# Patient Record
Sex: MALE | Race: BLACK OR AFRICAN AMERICAN | Employment: OTHER | URBAN - METROPOLITAN AREA
[De-identification: names, ages, dates, MRNs, and addresses within clinical notes are randomized per-mention and may not be internally consistent; named-entity substitution may affect disease eponyms.]

---

## 2017-01-04 ENCOUNTER — HOSPITAL ENCOUNTER (OUTPATIENT)
Dept: NUTRITION | Age: 52
Discharge: HOME | End: 2017-01-04
Payer: COMMERCIAL

## 2017-01-04 PROCEDURE — 97802 MEDICAL NUTRITION INDIV IN: CPT

## 2017-01-04 NOTE — PROGRESS NOTES
César Gomez 5 [facility]  [address] [facility], [facility], [address] [place] [address]  Phone: [phone]  Fax: [phone]   Nutrition Assessment - Medical Nutrition Therapy   Outpatient Initial Evaluation         Patient Name: Carolina Galvez : 1965   Treatment Diagnosis: Prediabetes Onset Date:    Referral Source: Rakesh Cook MD Start of Care [facility]): 2017     Ht:  70 in  cm Wt: 268 lb  kg   BMI: 38.5  BMR   Male  BMR female      PMHx includes: 2 strokes, HTN, depression     Medications of Nutritional Significance:   Atorvastatin, carvedilol, lasix, hydralazine, lithium carbonate, nifedipine, KCl, ramipril, spironolactone     Subjective/Assessment:   47YO male referred to RD for elevated blood sugar. Per BMI, pt is considered obese class 2. Pt reports he has gained 20 lbs within the last year and is currently the heaviest weight he has ever been. He claims he did not struggle with his weight growing up, that is has been more recent and he would like to lose weight. Most recent A1c was 5.8% on 16 (pre-DM range) and pt has a family hx of diabetes. Pt is on disability due to having 2 strokes and suffering from residual effects (such as short-term memory loss). He lives with his wife; they both grocery shop, but she cooks. Pt had a membership to the [employer] [facility], but it has . He would like to sign up and start exercising again. He is mostly sedentary, but walks his dog for 15 minutes every morning. Pt expressed comprehension, motivation to change, and compliance is expected. Current Eating Patterns: Per diet recall, pt consumes an excessive amount of carbohydrates and simple sugar from sweetened beverages. B-fast yesterday was cocoa crispy cereal with NF milk and 24 oz of sweetened green tea. At [facility], pt will eat a spicy chicken sandwich with fries and large lemonade.  Another lunch meal example is a ham sandwich from [employer] with 24 oz of sweetened green tea and a lemon tart. On average, pt is drinking approximately 72 oz of added sugars from sweetened green tea per day and is not drinking enough water. Handouts/  Information Provided: [x]  Carbohydrates  [x]  Protein  []  Fiber  [x]  Serving Sizes  []  Meal and Snack Ideas  []  Food Journals [x]  Diabetes  []  Cholesterol  []  Sodium  []  Gen Nutr Guidelines  []  SBGM Guidelines  [x]  Others: List of non-starchy vegetables  Educated pt on the pathophysiology of Type II Diabetes and insulin resistance and the rationale for dietary modification and increased activity. Educated pt on carbohydrate food sources, counting carbohydrates, label reading, and meal timing. Discussed the Healthy Plate Method and appropriate portion sizes of different foods groups. Explained the importance of combining carbohydrates with protein at meals and reviewed foods that contain each nutrient. Explained calorie density and empty calories to assist pt with understanding portion sizes and limiting excess calories and sugar. Estimate Needs:   Calories: 2100  Protein: 131 Carbs: 236 Fat: 70   Kcal/day  g/day  g/day  g/day        percent: 25  45  30     Nutritional Goal - To promote lifestyle changes to result in:    [x]  Weight loss  [x]  Improved diabetic control  []  Decreased cholesterol levels  []  Decreased blood pressure  []  Weight maintenance []  Preventing any interactions associated with food allergies  []  Adequate weight gain toward goal weight  []  Other:        Recommendations: - Pt will combine carbohydrates with a protein source at every meal and snack.  - Pt will limit carbohydrate intake to 60-65 gm/meal and no more than 20 gm/snack.  - Pt will do aerobic exercise (walk, swim or bike) for at least 20 minutes, 3 days per week. Include strength training classes 2 days per week.      Information Reviewed with: pt   Potential Barriers to Learning: none     Dietitian Signature: Codie Jessica RD Date: 1/4/2017   Follow-up: Wed, 1/25/17 @10am Time: 10:41 AM

## 2017-01-06 DIAGNOSIS — I10 ESSENTIAL HYPERTENSION WITH GOAL BLOOD PRESSURE LESS THAN 140/90: ICD-10-CM

## 2017-01-06 RX ORDER — NIFEDIPINE 60 MG/1
TABLET, EXTENDED RELEASE ORAL
Qty: 30 TAB | Refills: 0 | Status: SHIPPED | OUTPATIENT
Start: 2017-01-06 | End: 2017-02-02 | Stop reason: SDUPTHER

## 2017-01-06 NOTE — TELEPHONE ENCOUNTER
Pt calling to request medication refill of:    Requested Prescriptions     Pending Prescriptions Disp Refills    NIFEdipine ER (PROCARDIA XL) 60 mg ER tablet 30 Tab 2     Sig: TAKE ONE TABLET BY MOUTH ONCE DAILY          be sent to [facility]. Pt has about 1 tab remaining. Pts last appt was 12/22/16, next appt sched for march 2017. Advised pt of 72 hour time frame for refill requests. Please advise.

## 2017-01-18 ENCOUNTER — OFFICE VISIT (OUTPATIENT)
Dept: FAMILY MEDICINE CLINIC | Age: 52
End: 2017-01-18

## 2017-01-18 VITALS
HEART RATE: 82 BPM | DIASTOLIC BLOOD PRESSURE: 80 MMHG | SYSTOLIC BLOOD PRESSURE: 130 MMHG | WEIGHT: 272 LBS | BODY MASS INDEX: 42.69 KG/M2 | TEMPERATURE: 97.6 F | OXYGEN SATURATION: 97 % | RESPIRATION RATE: 16 BRPM | HEIGHT: 67 IN

## 2017-01-18 DIAGNOSIS — I10 ESSENTIAL HYPERTENSION: Primary | ICD-10-CM

## 2017-01-18 DIAGNOSIS — R73.03 PRE-DIABETES: ICD-10-CM

## 2017-01-18 DIAGNOSIS — I50.32 CHRONIC DIASTOLIC CONGESTIVE HEART FAILURE (HCC): ICD-10-CM

## 2017-01-18 DIAGNOSIS — I10 ESSENTIAL HYPERTENSION: ICD-10-CM

## 2017-01-18 NOTE — PROGRESS NOTES
Les Betts is a 46 y.o. male presents to office for follow up on HTN. Patient would also like to discuss rash on his head. 1. Have you been to the ER, urgent care clinic or hospitalized since your last visit? no  2. Have you seen any other providers outside of [facility] since your last visit? no  3. Have you had a Flu shot this year?  No       Health Maintenance items with a due date reviewed with patient:  Health Maintenance Due   Topic Date Due    Hepatitis C Screening  1965    DTaP/Tdap/Td series (1 - Tdap) 05/01/1984    FOBT Q 1 YEAR AGE 50-75  05/01/2013    Pneumococcal 19-64 Highest Risk (2 of 3 - PCV13) 05/21/2013    INFLUENZA AGE 9 TO ADULT  08/01/2016

## 2017-01-25 ENCOUNTER — HOSPITAL ENCOUNTER (OUTPATIENT)
Dept: NUTRITION | Age: 52
Discharge: HOME | End: 2017-01-25
Payer: COMMERCIAL

## 2017-01-25 PROCEDURE — 97803 MED NUTRITION INDIV SUBSEQ: CPT

## 2017-01-25 NOTE — PROGRESS NOTES
NUTRITION - DAILY TREATMENT NOTE  Patient Name: Lupis Aguayo         Date: 2017  : 1965    YES Patient  Verified  Insurance: Payor: Brandy Love / Plan: [address] PT / Product Type: Commerical /    Diagnosis:    Prediabetes   In time:   10am             Out time:   10:30am   Total Treatment Time (min):   30     SUBJECTIVE    Medication Changes/New allergies or changes in medical history, any new surgeries or procedures? NO    If yes, update Summary List   Subjective Functional Status/Changes:  []  No changes reported   Pt reports there was a death in the family (father-in-law) so he did not accomplish all his goals since his first visit. He was unable to make it to the [place] [address] to follow-up on membership and he was only able to walk for 20 minutes 3 days since the last visit. Although his b-fast remains high in carbohydrates (cereal, milk, banana and cran-grape 50% juice), he started measuring out his portion of cereal and milk to reduce his carb intake. He also claims to have cut out many cookies and candy. He continues to drink beverages that are high in sugary, such as mingo and honey tea, juice and soda. Current Wt: 268# Previous Wt: 268# Wt Change: none     OBJECTIVE    Patient Education:  [x]  Review current plan with patient   []  Other: Discussed empty calories and sugary beverages and how they can affect BG and wt gain.    Handouts/  Information Provided: []  Carbohydrates  []  Protein  []  Fiber  []  Serving Sizes  []  Fluids  []  General guidelines []  Diabetes  []  Cholesterol  []  Sodium  []  SBGM  []  Food Journals  []  Others:    Estimated Needs: 2100 131 236 70    Calories Protein CHO Fat     ASSESSMENT    []  Pt Progressing Well [x]  Slow Progress []  No Progress    Other:    Understand Dietary       Changes/Recommendations []  No Change [x]  Improving []  N/A   Weight [x]  No Change []  Improving []  N/A   Glucose Levels []  No Change []  Improving []  N/A Cholesterol Levels []  No Change []  Improving []  N/A     RECOMMENDATIONS    - Pt will combine carbohydrates with a protein source at every meal and snack.  - Pt will limit carbohydrate intake to 60-65 gm/meal and no more than 20 gm/snack.  - Pt will do aerobic exercise (walk, swim or bike) for at least 20 minutes, 3 days per week. Include strength training classes 2 days per week. - Pt will replace juice and sweet tea with sugar-free drink mixes at meal times.      PLAN    [x]  Continue on current plan []  Follow-up PRN   []  Discharge due to :    [x]  Next Appt[de-identified] Wed, 2/22/17 @10am     Dietitian: Brown Torre RD    Date: 1/25/2017 Time: 11:43 AM

## 2017-02-02 DIAGNOSIS — I10 ESSENTIAL HYPERTENSION WITH GOAL BLOOD PRESSURE LESS THAN 140/90: ICD-10-CM

## 2017-02-02 NOTE — TELEPHONE ENCOUNTER
Pt calling to request medication refill of:  Requested Prescriptions     Pending Prescriptions Disp Refills    NIFEdipine ER (PROCARDIA XL) 60 mg ER tablet 30 Tab 0     Sig: TAKE ONE TABLET BY MOUTH ONCE DAILY       be sent to [facility] [address] [facility]  Pt has about 4 tabs remaining. Pts last appt was 1/18/17, next appt sched for 3/16/17. Advised pt of 72 hour time frame for refill requests. Please advise.

## 2017-02-03 RX ORDER — NIFEDIPINE 60 MG/1
TABLET, EXTENDED RELEASE ORAL
Qty: 30 TAB | Refills: 0 | Status: SHIPPED | OUTPATIENT
Start: 2017-02-03 | End: 2017-03-07 | Stop reason: SDUPTHER

## 2017-02-04 RX ORDER — NIFEDIPINE 60 MG/1
TABLET, EXTENDED RELEASE ORAL
Qty: 30 TAB | Refills: 0 | Status: SHIPPED | OUTPATIENT
Start: 2017-02-04 | End: 2017-02-27 | Stop reason: SDUPTHER

## 2017-02-15 DIAGNOSIS — I10 ESSENTIAL HYPERTENSION WITH GOAL BLOOD PRESSURE LESS THAN 140/90: ICD-10-CM

## 2017-02-15 RX ORDER — HYDRALAZINE HYDROCHLORIDE 25 MG/1
TABLET, FILM COATED ORAL
Qty: 90 TAB | Refills: 3 | Status: SHIPPED | OUTPATIENT
Start: 2017-02-15 | End: 2017-02-27 | Stop reason: SDUPTHER

## 2017-02-15 NOTE — TELEPHONE ENCOUNTER
Pt calling to request medication refill of:    Requested Prescriptions     Pending Prescriptions Disp Refills    hydrALAZINE (APRESOLINE) 25 mg tablet 90 Tab 3     Sig: TAKE ONE TABLET BY MOUTH THREE TIMES DAILY          be sent to [address] [place] [address]. Pt has about 3 tabs remaining. Pts last appt was 01/18, next appt sched for 03/16. Advised pt of 72 hour time frame for refill requests. Please advise.

## 2017-02-27 ENCOUNTER — OFFICE VISIT (OUTPATIENT)
Dept: FAMILY MEDICINE CLINIC | Age: 52
End: 2017-02-27

## 2017-02-27 VITALS
RESPIRATION RATE: 16 BRPM | HEART RATE: 59 BPM | WEIGHT: 273 LBS | BODY MASS INDEX: 42.85 KG/M2 | SYSTOLIC BLOOD PRESSURE: 120 MMHG | DIASTOLIC BLOOD PRESSURE: 70 MMHG | HEIGHT: 67 IN | TEMPERATURE: 97.4 F | OXYGEN SATURATION: 97 %

## 2017-02-27 DIAGNOSIS — R19.7 DIARRHEA, UNSPECIFIED TYPE: Primary | ICD-10-CM

## 2017-02-27 DIAGNOSIS — R68.89 SENSATION OF FEELING COLD: ICD-10-CM

## 2017-02-27 DIAGNOSIS — N18.30 CKD (CHRONIC KIDNEY DISEASE) STAGE 3, GFR 30-59 ML/MIN (HCC): ICD-10-CM

## 2017-02-27 RX ORDER — FUROSEMIDE 20 MG/1
20 TABLET ORAL 2 TIMES DAILY
Qty: 90 TAB | Refills: 1 | Status: SHIPPED | OUTPATIENT
Start: 2017-02-27 | End: 2017-05-22 | Stop reason: SDUPTHER

## 2017-02-27 RX ORDER — FUROSEMIDE 20 MG/1
20 TABLET ORAL 2 TIMES DAILY
COMMUNITY
End: 2017-02-27 | Stop reason: SDUPTHER

## 2017-02-27 RX ORDER — FUROSEMIDE 40 MG/1
TABLET ORAL
Qty: 120 TAB | Refills: 6 | Status: CANCELLED | OUTPATIENT
Start: 2017-02-27

## 2017-02-27 RX ORDER — HYDRALAZINE HYDROCHLORIDE 25 MG/1
TABLET, FILM COATED ORAL
Qty: 90 TAB | Refills: 3 | Status: SHIPPED | OUTPATIENT
Start: 2017-02-27 | End: 2017-10-30 | Stop reason: SDUPTHER

## 2017-02-27 NOTE — PROGRESS NOTES
HISTORY OF PRESENT ILLNESS  Sagar Chandler is a 46 y.o. male here for evaluation of diarrhea and feeling cold. Patient states that 10 days ago he noticed loose stools without abdominal pain or nausea or vomiting. There was no stool color change. Since then the symptoms have resolved. He still has a sensation of feeling cold. .. Chills    The history is provided by the patient. This is a new problem. The problem has been resolved. His temperature was unmeasured prior to arrival. Associated symptoms include diarrhea. Pertinent negatives include no vomiting, no congestion, no headaches, no sore throat, no muscle aches, no shortness of breath and no urinary symptoms. He has tried nothing for the symptoms. Diarrhea    This is a new problem. The problem has been resolved. There has been no fever. The stool consistency is described as watery. Associated symptoms include chills. Pertinent negatives include no vomiting and no headaches. Risk factors include recent illness. He has tried nothing for the symptoms. His past medical history does not include irritable bowel syndrome, inflammatory bowel disease, bowel resection or malabsorption. Chronic Kidney Disease   The history is provided by the medical records. This is a chronic problem. The problem has been gradually improving. Pertinent negatives include no headaches and no shortness of breath. Nothing aggravates the symptoms. Relieved by: Decreasing Lasix.      Allergies   Allergen Reactions    Pcn [Penicillins] Anaphylaxis and Angioedema    Coconut Swelling     Current Outpatient Prescriptions on File Prior to Visit   Medication Sig Dispense Refill    hydrALAZINE (APRESOLINE) 25 mg tablet TAKE ONE TABLET BY MOUTH THREE TIMES DAILY 90 Tab 3    NIFEdipine ER (PROCARDIA XL) 60 mg ER tablet TAKE ONE TABLET BY MOUTH ONCE DAILY 30 Tab 0    atorvastatin (LIPITOR) 20 mg tablet TAKE ONE TABLET BY MOUTH ONCE DAILY 30 Tab 6    spironolactone (ALDACTONE) 25 mg tablet Take one half tab daily 30 Tab 6    ramipril (ALTACE) 10 mg capsule Take 1 Cap by mouth daily. 30 Cap 1    potassium chloride (KLOR-CON M10) 10 mEq tablet TAKE TWO TABLETS BY MOUTH ONCE DAILY 60 Tab 3    carvedilol (COREG) 25 mg tablet TAKE ONE TABLET BY MOUTH TWICE DAILY WITH  MEALS 180 Tab 1    febuxostat (ULORIC) 40 mg tab tablet Take 2 Tabs by mouth daily. 90 Tab 3    diclofenac (VOLTAREN) 1 % gel Apply 4 g to affected area four (4) times daily. 100 g 0    MISCELLANEOUS MEDICAL SUPPLY (COMPRESSION STOCKINGS) by Does Not Apply route. Wear while awake. Remove at HS.  LORazepam (ATIVAN) 1 mg tablet Take 1 Tab by mouth every eight (8) hours as needed. Max Daily Amount: 3 mg. Indications: ANXIETY, INSOMNIA 90 Tab 0    ARIPiprazole (ABILIFY) 5 mg tablet Take  by mouth daily.  aspirin delayed-release 81 mg tablet Take 81 mg by mouth daily.  lithium carbonate 300 mg tablet Take 300 mg by mouth daily.  furosemide (LASIX) 40 mg tablet Take two tabs twice daily  Indications: EDEMA (Patient taking differently: 20 mg. Take two tabs twice daily  Indications: Edema) 120 Tab 6     No current facility-administered medications on file prior to visit.       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     Cardiomyopathy ([facility]     CHF (congestive heart failure) (HCC)     CVA (cerebral infarction)     Depression     ED (erectile dysfunction)     Gastritis     Gout     HTN (hypertension)     Hyperlipemia, mixed     Hypoxia     Kidney disease, chronic, stage III (moderate, EGFR 30-59 ml/min)     Pre-diabetes     Sleep apnea     Vitamin D deficiency      Past Surgical History:   Procedure Laterality Date    HX ACL RECONSTRUCTION      HX APPENDECTOMY  2004    became septic from a rupture     Family History   Problem Relation Age of Onset    No Known Problems Mother     Hypertension Father     Diabetes Father     Cancer Father      prostate    No Known Problems Sister     Hypertension Maternal Grandmother     No Known Problems Maternal Grandfather     Alcohol abuse Paternal Grandmother     Bipolar Disorder Paternal Grandmother     No Known Problems Paternal Grandfather      Social History     Social History    Marital status:      Spouse name: N/A    Number of children: N/A    Years of education: N/A     Occupational History    Not on file. Social History Main Topics    Smoking status: Never Smoker    Smokeless tobacco: Never Used    Alcohol use No      Comment: holidays/ occasions    Drug use: No    Sexual activity: Yes     Partners: Female     Other Topics Concern     Service Yes     [employer]    Blood Transfusions No    Caffeine Concern No    Occupational Exposure No    Hobby Hazards No    Sleep Concern No     sleep apnea, has cpap    Stress Concern Yes     family related    Weight Concern No     Pt has lost 20lbs but is wanting to lose more    Special Diet Yes     no salt    Back Care No    Exercise No    Bike Helmet No    Seat Belt Yes    Self-Exams Yes     Social History Narrative         Review of Systems   Constitutional: Positive for chills. HENT: Negative for congestion and sore throat. Respiratory: Negative. Negative for shortness of breath. Cardiovascular: Negative. Gastrointestinal: Positive for diarrhea. Negative for vomiting. Musculoskeletal: Negative. Neurological: Negative. Negative for headaches. Endo/Heme/Allergies: Negative. Psychiatric/Behavioral: Negative. Visit Vitals    /70 (BP 1 Location: Right arm, BP Patient Position: Sitting)    Pulse (!) 59    Temp 97.4 °F (36.3 °C) (Oral)    Resp 16    Ht 5' 7\" (1.702 m)    Wt 273 lb (123.8 kg)    SpO2 97%    BMI 42.76 kg/m2       Physical Exam   Constitutional: He is oriented to person, place, and time. He appears well-developed and well-nourished. HENT:   Head: Normocephalic and atraumatic.    Cardiovascular: Normal rate, regular rhythm, normal heart sounds and intact distal pulses. Exam reveals no gallop and no friction rub. No murmur heard. Pulmonary/Chest: Effort normal and breath sounds normal. No respiratory distress. He has no wheezes. He has no rales. Musculoskeletal: He exhibits no edema. Neurological: He is alert and oriented to person, place, and time. No cranial nerve deficit. Psychiatric: He has a normal mood and affect. His behavior is normal. Judgment and thought content normal.   Nursing note and vitals reviewed. ASSESSMENT and PLAN    ICD-10-CM ICD-9-CM    1. Diarrhea, unspecified type R19.7 787.91    2. Sensation of feeling cold R68.89 780.99 TSH 3RD GENERATION   3. CKD (chronic kidney disease) stage 3, GFR 30-59 ml/min Y98.3 609.9 METABOLIC PANEL, BASIC     Follow-up Disposition:  Return for keep regular scheduled appointment.

## 2017-02-28 LAB
ANION GAP SERPL CALC-SCNC: 21 MMOL/L
BUN SERPL-MCNC: 16 MG/DL (ref 6–22)
CALCIUM SERPL-MCNC: 8.8 MG/DL (ref 8.4–10.4)
CHLORIDE SERPL-SCNC: 101 MMOL/L (ref 98–110)
CO2 SERPL-SCNC: 23 MMOL/L (ref 20–32)
CREAT SERPL-MCNC: 1.4 MG/DL (ref 0.5–1.2)
GFRAA, 66117: >60
GFRNA, 66118: 51.7
GLUCOSE SERPL-MCNC: 98 MG/DL (ref 65–99)
POTASSIUM SERPL-SCNC: 4.1 MMOL/L (ref 3.5–5.5)
SODIUM SERPL-SCNC: 145 MMOL/L (ref 133–145)
TSH SERPL DL<=0.005 MIU/L-ACNC: 1.89 MCU/ML (ref 0.27–4.2)

## 2017-03-07 DIAGNOSIS — I10 ESSENTIAL HYPERTENSION WITH GOAL BLOOD PRESSURE LESS THAN 140/90: ICD-10-CM

## 2017-03-07 NOTE — TELEPHONE ENCOUNTER
Pt calling to request a refill on the pended medication. Pt has enough to last the rest of this week and is going out of town on Saturday. Pt would like to be able to refill this before then. Please advise.     Requested Prescriptions     Pending Prescriptions Disp Refills    NIFEdipine ER (PROCARDIA XL) 60 mg ER tablet 30 Tab 0

## 2017-03-08 RX ORDER — NIFEDIPINE 60 MG/1
TABLET, EXTENDED RELEASE ORAL
Qty: 30 TAB | Refills: 6 | Status: SHIPPED | OUTPATIENT
Start: 2017-03-08 | End: 2017-10-30 | Stop reason: SDUPTHER

## 2017-03-08 NOTE — TELEPHONE ENCOUNTER
Dr. Beba Galvez, please see refill request for patient, thank you!     Requested Prescriptions     Pending Prescriptions Disp Refills    NIFEdipine ER (PROCARDIA XL) 60 mg ER tablet 30 Tab 0

## 2017-03-15 ENCOUNTER — OFFICE VISIT (OUTPATIENT)
Dept: FAMILY MEDICINE CLINIC | Age: 52
End: 2017-03-15

## 2017-03-15 VITALS
WEIGHT: 276 LBS | BODY MASS INDEX: 43.32 KG/M2 | TEMPERATURE: 97.8 F | OXYGEN SATURATION: 98 % | HEIGHT: 67 IN | HEART RATE: 68 BPM | SYSTOLIC BLOOD PRESSURE: 140 MMHG | DIASTOLIC BLOOD PRESSURE: 74 MMHG | RESPIRATION RATE: 16 BRPM

## 2017-03-15 DIAGNOSIS — E78.2 HYPERLIPEMIA, MIXED: ICD-10-CM

## 2017-03-15 DIAGNOSIS — I10 ESSENTIAL HYPERTENSION: Primary | ICD-10-CM

## 2017-03-15 DIAGNOSIS — I50.32 CHRONIC DIASTOLIC CONGESTIVE HEART FAILURE (HCC): ICD-10-CM

## 2017-03-15 NOTE — PROGRESS NOTES
HISTORY OF PRESENT ILLNESS  Michael Acuna is a 46 y.o. male here for follow-up of hypertension CHF and hyperlipidemia. Patient is still taking lithium. Raysa Riser Hypertension    The history is provided by the patient and medical records. This is a chronic problem. The problem has been gradually improving. Pertinent negatives include no chest pain, no orthopnea, no headaches, no peripheral edema and no shortness of breath. There are no associated agents to hypertension. Risk factors include dyslipidemia, obesity and male gender. CHF   The history is provided by the patient and medical records. This is a chronic problem. The problem has been gradually improving. Pertinent negatives include no chest pain, no headaches and no shortness of breath. Nothing aggravates the symptoms. The symptoms are relieved by medications. Treatments tried: Carvedilol Lasix and hydralazine and Spironolactone. The treatment provided significant relief. Cholesterol Problem   The history is provided by the patient and medical records. This is a chronic problem. The problem has been gradually improving. Pertinent negatives include no chest pain, no headaches and no shortness of breath. The symptoms are aggravated by eating. The symptoms are relieved by medications. Treatments tried: Lipitor. The treatment provided significant relief. Allergies   Allergen Reactions    Pcn [Penicillins] Anaphylaxis and Angioedema    Coconut Swelling     Current Outpatient Prescriptions on File Prior to Visit   Medication Sig Dispense Refill    NIFEdipine ER (PROCARDIA XL) 60 mg ER tablet TAKE ONE TABLET BY MOUTH ONCE DAILY 30 Tab 6    hydrALAZINE (APRESOLINE) 25 mg tablet TAKE ONE TABLET BY MOUTH THREE TIMES DAILY 90 Tab 3    furosemide (LASIX) 20 mg tablet Take 1 Tab by mouth two (2) times a day.  90 Tab 1    atorvastatin (LIPITOR) 20 mg tablet TAKE ONE TABLET BY MOUTH ONCE DAILY 30 Tab 6    spironolactone (ALDACTONE) 25 mg tablet Take one half tab daily 30 Tab 6    ramipril (ALTACE) 10 mg capsule Take 1 Cap by mouth daily. 30 Cap 1    potassium chloride (KLOR-CON M10) 10 mEq tablet TAKE TWO TABLETS BY MOUTH ONCE DAILY 60 Tab 3    carvedilol (COREG) 25 mg tablet TAKE ONE TABLET BY MOUTH TWICE DAILY WITH  MEALS 180 Tab 1    febuxostat (ULORIC) 40 mg tab tablet Take 2 Tabs by mouth daily. 90 Tab 3    furosemide (LASIX) 40 mg tablet Take two tabs twice daily  Indications: EDEMA (Patient taking differently: 20 mg. Take two tabs twice daily  Indications: Edema) 120 Tab 6    diclofenac (VOLTAREN) 1 % gel Apply 4 g to affected area four (4) times daily. 100 g 0    MISCELLANEOUS MEDICAL SUPPLY (COMPRESSION STOCKINGS) by Does Not Apply route. Wear while awake. Remove at HS.  LORazepam (ATIVAN) 1 mg tablet Take 1 Tab by mouth every eight (8) hours as needed. Max Daily Amount: 3 mg. Indications: ANXIETY, INSOMNIA 90 Tab 0    ARIPiprazole (ABILIFY) 5 mg tablet Take  by mouth daily.  aspirin delayed-release 81 mg tablet Take 81 mg by mouth daily.  lithium carbonate 300 mg tablet Take 300 mg by mouth daily. No current facility-administered medications on file prior to visit.       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 ([facility]     Cardiomyopathy ([facility]     CHF (congestive heart failure) (HCC)     CVA (cerebral infarction)     Depression     ED (erectile dysfunction)     Gastritis     Gout     HTN (hypertension)     Hyperlipemia, mixed     Hypoxia     Kidney disease, chronic, stage III (moderate, EGFR 30-59 ml/min)     Pre-diabetes     Sleep apnea     Vitamin D deficiency      Past Surgical History:   Procedure Laterality Date    HX ACL RECONSTRUCTION      HX APPENDECTOMY  2004    became septic from a rupture     Family History   Problem Relation Age of Onset    No Known Problems Mother     Hypertension Father     Diabetes Father     Cancer Father      prostate    No Known Problems Sister    [place] Hypertension Maternal Grandmother     No Known Problems Maternal Grandfather     Alcohol abuse Paternal Grandmother     Bipolar Disorder Paternal Grandmother     No Known Problems Paternal Grandfather      Social History     Social History    Marital status:      Spouse name: N/A    Number of children: N/A    Years of education: N/A     Occupational History    Not on file. Social History Main Topics    Smoking status: Never Smoker    Smokeless tobacco: Never Used    Alcohol use No      Comment: holidays/ occasions    Drug use: No    Sexual activity: Yes     Partners: Female     Other Topics Concern     Service Yes     [employer]    Blood Transfusions No    Caffeine Concern No    Occupational Exposure No    Hobby Hazards No    Sleep Concern No     sleep apnea, has cpap    Stress Concern Yes     family related    Weight Concern No     Pt has lost 20lbs but is wanting to lose more    Special Diet Yes     no salt    Back Care No    Exercise No    Bike Helmet No    Seat Belt Yes    Self-Exams Yes     Social History Narrative       Review of Systems   Constitutional: Negative. Respiratory: Negative. Negative for shortness of breath. Cardiovascular: Negative. Negative for chest pain and orthopnea. Musculoskeletal: Negative. Neurological: Negative for headaches. Endo/Heme/Allergies: Negative. Psychiatric/Behavioral: Negative. Visit Vitals    /74 (BP 1 Location: Right arm, BP Patient Position: Sitting)    Pulse 68    Temp 97.8 °F (36.6 °C) (Oral)    Resp 16    Ht 5' 7\" (1.702 m)    Wt 276 lb (125.2 kg)    SpO2 98%    BMI 43.23 kg/m2         Physical Exam   Constitutional: He is oriented to person, place, and time. He appears well-developed and well-nourished. HENT:   Head: Normocephalic and atraumatic. Cardiovascular: Normal rate, regular rhythm, normal heart sounds and intact distal pulses. Exam reveals no gallop and no friction rub.     No murmur heard. Pulmonary/Chest: Effort normal and breath sounds normal. No respiratory distress. He has no wheezes. He has no rales. Musculoskeletal: Normal range of motion. He exhibits no edema or deformity. Neurological: He is alert and oriented to person, place, and time. No cranial nerve deficit. Skin: Skin is warm and dry. Psychiatric: He has a normal mood and affect. His behavior is normal. Judgment and thought content normal.   Nursing note and vitals reviewed. ASSESSMENT and PLAN    ICD-10-CM ICD-9-CM    1. Essential hypertension I10 401.9    2. Hyperlipemia, mixed E78.2 272.2    3. Chronic diastolic congestive heart failure ([facility] I50.32 428.32      428.0      Follow-up Disposition:  Return in about 4 months (around 7/15/2017).   current treatment plan is effective, no change in therapy

## 2017-03-22 ENCOUNTER — APPOINTMENT (OUTPATIENT)
Dept: NUTRITION | Age: 52
End: 2017-03-22

## 2017-04-14 DIAGNOSIS — I50.33 ACUTE ON CHRONIC DIASTOLIC CONGESTIVE HEART FAILURE (HCC): ICD-10-CM

## 2017-04-14 DIAGNOSIS — R06.00 DYSPNEA, UNSPECIFIED TYPE: ICD-10-CM

## 2017-04-14 NOTE — TELEPHONE ENCOUNTER
Please see refill request.    Requested Prescriptions     Pending Prescriptions Disp Refills    furosemide (LASIX) 40 mg tablet 120 Tab 6     Sig: Take two tabs twice daily  Indications: Edema

## 2017-04-19 RX ORDER — FUROSEMIDE 40 MG/1
20 TABLET ORAL 2 TIMES DAILY
Qty: 60 TAB | Refills: 6 | Status: SHIPPED | OUTPATIENT
Start: 2017-04-19 | End: 2017-05-15 | Stop reason: DRUGHIGH

## 2017-05-09 ENCOUNTER — TELEPHONE (OUTPATIENT)
Dept: FAMILY MEDICINE CLINIC | Age: 52
End: 2017-05-09

## 2017-05-09 NOTE — TELEPHONE ENCOUNTER
Pt calling to inform Dr. Keven Renteria that his Psych provider Dr. Russ Navas took him off of Lithium. He has been off of it since May 4th. He just wanted to let Dr. Keven Renteria know, and didn't know if she wanted him to start a different medication. Please advise.

## 2017-05-15 ENCOUNTER — OFFICE VISIT (OUTPATIENT)
Dept: FAMILY MEDICINE CLINIC | Age: 52
End: 2017-05-15

## 2017-05-15 VITALS
SYSTOLIC BLOOD PRESSURE: 140 MMHG | TEMPERATURE: 98.2 F | WEIGHT: 278.2 LBS | OXYGEN SATURATION: 97 % | RESPIRATION RATE: 18 BRPM | HEIGHT: 67 IN | HEART RATE: 73 BPM | BODY MASS INDEX: 43.66 KG/M2 | DIASTOLIC BLOOD PRESSURE: 68 MMHG

## 2017-05-15 DIAGNOSIS — E78.2 HYPERLIPEMIA, MIXED: ICD-10-CM

## 2017-05-15 DIAGNOSIS — I50.32 CHRONIC DIASTOLIC CONGESTIVE HEART FAILURE (HCC): Primary | ICD-10-CM

## 2017-05-15 DIAGNOSIS — I10 ESSENTIAL HYPERTENSION: ICD-10-CM

## 2017-05-15 DIAGNOSIS — J06.9 VIRAL UPPER RESPIRATORY TRACT INFECTION: ICD-10-CM

## 2017-05-15 RX ORDER — LOSARTAN POTASSIUM 100 MG/1
100 TABLET ORAL DAILY
Qty: 30 TAB | Refills: 6 | Status: SHIPPED | OUTPATIENT
Start: 2017-05-15 | End: 2017-12-04 | Stop reason: SDUPTHER

## 2017-05-15 RX ORDER — AZITHROMYCIN 250 MG/1
250 TABLET, FILM COATED ORAL SEE ADMIN INSTRUCTIONS
Qty: 6 TAB | Refills: 0 | Status: SHIPPED | OUTPATIENT
Start: 2017-05-15 | End: 2017-05-23

## 2017-05-15 NOTE — PROGRESS NOTES
HISTORY OF PRESENT ILLNESS  Karen Munoz is a 45 y.o. male here for evaluation of upper respiratory tract symptoms. Of note patient is no longer taking lithium. Patient has mild cough productive of yellow sputum. He is also complaining of congestion of his right nostril. CHF   The history is provided by the patient and medical records. This is a chronic problem. The problem has been gradually improving. Pertinent negatives include no chest pain, no abdominal pain, no headaches and no shortness of breath. The symptoms are aggravated by drinking. The symptoms are relieved by medications. Treatments tried: Lasix hydralazine Spironolactone and ramipril. The treatment provided significant relief. Cough   The history is provided by the patient and medical records. This is a new problem. The problem has been gradually worsening. Pertinent negatives include no chest pain, no abdominal pain, no headaches and no shortness of breath. Nothing aggravates the symptoms. Nothing relieves the symptoms. He has tried nothing for the symptoms. Hypertension    The history is provided by the patient and medical records. This is a chronic problem. The problem has been gradually improving. Pertinent negatives include no chest pain, no orthopnea, no headaches, no peripheral edema, no dizziness, no shortness of breath and no nausea. Risk factors include obesity and dyslipidemia. Cholesterol Problem   The history is provided by the medical records. This is a chronic problem. The problem has been gradually improving. Pertinent negatives include no chest pain, no abdominal pain, no headaches and no shortness of breath. The symptoms are aggravated by eating. The symptoms are relieved by medications. Treatments tried: Lipitor. The treatment provided significant relief. URI    The history is provided by the patient. This is a new problem. The problem has not changed since onset. There has been no fever.  Associated symptoms include congestion and cough. Pertinent negatives include no chest pain, no abdominal pain, no diarrhea, no nausea and no headaches. Allergies   Allergen Reactions    Pcn [Penicillins] Anaphylaxis and Angioedema    Coconut Swelling     Current Outpatient Prescriptions on File Prior to Visit   Medication Sig Dispense Refill    NIFEdipine ER (PROCARDIA XL) 60 mg ER tablet TAKE ONE TABLET BY MOUTH ONCE DAILY 30 Tab 6    hydrALAZINE (APRESOLINE) 25 mg tablet TAKE ONE TABLET BY MOUTH THREE TIMES DAILY 90 Tab 3    furosemide (LASIX) 20 mg tablet Take 1 Tab by mouth two (2) times a day. 90 Tab 1    atorvastatin (LIPITOR) 20 mg tablet TAKE ONE TABLET BY MOUTH ONCE DAILY 30 Tab 6    spironolactone (ALDACTONE) 25 mg tablet Take one half tab daily 30 Tab 6    ramipril (ALTACE) 10 mg capsule Take 1 Cap by mouth daily. 30 Cap 1    potassium chloride (KLOR-CON M10) 10 mEq tablet TAKE TWO TABLETS BY MOUTH ONCE DAILY 60 Tab 3    carvedilol (COREG) 25 mg tablet TAKE ONE TABLET BY MOUTH TWICE DAILY WITH  MEALS 180 Tab 1    febuxostat (ULORIC) 40 mg tab tablet Take 2 Tabs by mouth daily. 90 Tab 3    diclofenac (VOLTAREN) 1 % gel Apply 4 g to affected area four (4) times daily. 100 g 0    MISCELLANEOUS MEDICAL SUPPLY (COMPRESSION STOCKINGS) by Does Not Apply route. Wear while awake. Remove at HS.  LORazepam (ATIVAN) 1 mg tablet Take 1 Tab by mouth every eight (8) hours as needed. Max Daily Amount: 3 mg. Indications: ANXIETY, INSOMNIA 90 Tab 0    ARIPiprazole (ABILIFY) 5 mg tablet Take 5 mg by mouth daily.  aspirin delayed-release 81 mg tablet Take 81 mg by mouth daily. No current facility-administered medications on file prior to visit.       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 ([facility]     Cardiomyopathy ([facility]     CHF (congestive heart failure) (HCC)     CVA (cerebral infarction)     Depression     ED (erectile dysfunction)     Gastritis     Gout     HTN (hypertension)     Hyperlipemia, mixed     Hypoxia     Kidney disease, chronic, stage III (moderate, EGFR 30-59 ml/min)     Pre-diabetes     Sleep apnea     Vitamin D deficiency      Social History     Social History    Marital status:      Spouse name: N/A    Number of children: N/A    Years of education: N/A     Occupational History    Not on file. Social History Main Topics    Smoking status: Never Smoker    Smokeless tobacco: Never Used    Alcohol use No      Comment: holidays/ occasions    Drug use: No    Sexual activity: Yes     Partners: Female     Other Topics Concern     Service Yes     [employer]    Blood Transfusions No    Caffeine Concern No    Occupational Exposure No    Hobby Hazards No    Sleep Concern No     sleep apnea, has cpap    Stress Concern Yes     family related    Weight Concern No     Pt has lost 20lbs but is wanting to lose more    Special Diet Yes     no salt    Back Care No    Exercise No    Bike Helmet No    Seat Belt Yes    Self-Exams Yes     Social History Narrative     Family History   Problem Relation Age of Onset    No Known Problems Mother     Hypertension Father     Diabetes Father     Cancer Father      prostate    No Known Problems Sister     Hypertension Maternal Grandmother     No Known Problems Maternal Grandfather     Alcohol abuse Paternal Grandmother     Bipolar Disorder Paternal Grandmother     No Known Problems Paternal Grandfather      Social History     Social History    Marital status:      Spouse name: N/A    Number of children: N/A    Years of education: N/A     Occupational History    Not on file.      Social History Main Topics    Smoking status: Never Smoker    Smokeless tobacco: Never Used    Alcohol use No      Comment: holidays/ occasions    Drug use: No    Sexual activity: Yes     Partners: Female     Other Topics Concern     Service Yes     [employer]    Blood Transfusions No    Caffeine Concern No    Occupational Exposure No    Hobby Hazards No    Sleep Concern No     sleep apnea, has cpap    Stress Concern Yes     family related    Weight Concern No     Pt has lost 20lbs but is wanting to lose more    Special Diet Yes     no salt    Back Care No    Exercise No    Bike Helmet No    Seat Belt Yes    Self-Exams Yes     Social History Narrative         Review of Systems   HENT: Positive for congestion. Respiratory: Positive for cough. Negative for shortness of breath. Cardiovascular: Negative for chest pain and orthopnea. Gastrointestinal: Negative for abdominal pain, diarrhea and nausea. Neurological: Negative for dizziness and headaches. Visit Vitals    /68 (BP 1 Location: Right arm, BP Patient Position: Sitting)  Comment: manual    Pulse 73    Temp 98.2 °F (36.8 °C) (Oral)    Resp 18    Ht 5' 7\" (1.702 m)    Wt 278 lb 3.2 oz (126.2 kg)    SpO2 97%    BMI 43.57 kg/m2         Physical Exam   Constitutional: He is oriented to person, place, and time. He appears well-developed and well-nourished. HENT:   Head: Normocephalic and atraumatic. Cardiovascular: Normal rate, regular rhythm, normal heart sounds and intact distal pulses. Exam reveals no gallop and no friction rub. No murmur heard. Pulmonary/Chest: Effort normal and breath sounds normal. No respiratory distress. He has no wheezes. He has no rales. Musculoskeletal: Normal range of motion. He exhibits no edema, tenderness or deformity. Neurological: He is alert and oriented to person, place, and time. No cranial nerve deficit. Coordination normal.   Skin: Skin is warm and dry. No rash noted. No erythema. No pallor. Psychiatric: He has a normal mood and affect. His behavior is normal. Judgment and thought content normal.   Nursing note and vitals reviewed. ASSESSMENT and PLAN    ICD-10-CM ICD-9-CM    1.  Chronic diastolic congestive heart failure (HCC) I50.32 428.32 losartan (COZAAR) 100 mg tablet     428.0    2. Hyperlipemia, mixed E78.2 272.2 losartan (COZAAR) 100 mg tablet   3. Essential hypertension I10 401.9 losartan (COZAAR) 100 mg tablet   4. Viral upper respiratory tract infection J06.9 465.9     B97.89       Follow-up Disposition:  Return in about 4 weeks (around 6/12/2017).

## 2017-05-15 NOTE — PROGRESS NOTES
Prince Olivares is a 45 y.o. male presents in office with c/o cough with yellow sputum and nasal congestion. Patient also wanted to make you aware that he was taken off lithium. 1. Have you been to the ER, urgent care clinic since your last visit? Hospitalized since your last visit? No    2. Have you seen or consulted any other health care providers outside of the [address] [place] [address] since your last visit? Include any pap smears or colon screening.  No

## 2017-05-22 NOTE — TELEPHONE ENCOUNTER
Requested Prescriptions     Pending Prescriptions Disp Refills    furosemide (LASIX) 20 mg tablet 180 Tab 1     Sig: Take 1 Tab by mouth two (2) times a day.

## 2017-05-23 ENCOUNTER — OFFICE VISIT (OUTPATIENT)
Dept: FAMILY MEDICINE CLINIC | Age: 52
End: 2017-05-23

## 2017-05-23 VITALS
WEIGHT: 280 LBS | HEART RATE: 56 BPM | DIASTOLIC BLOOD PRESSURE: 90 MMHG | BODY MASS INDEX: 43.95 KG/M2 | HEIGHT: 67 IN | RESPIRATION RATE: 16 BRPM | TEMPERATURE: 98.9 F | SYSTOLIC BLOOD PRESSURE: 150 MMHG | OXYGEN SATURATION: 97 %

## 2017-05-23 DIAGNOSIS — R19.7 DIARRHEA, UNSPECIFIED TYPE: Primary | ICD-10-CM

## 2017-05-23 DIAGNOSIS — K92.1 MELENA: ICD-10-CM

## 2017-05-23 NOTE — PROGRESS NOTES
Mamta Solitario is a 45 y.o. male presents to office for stomach flu x 4 days       1. Have you been to the ER, urgent care clinic or hospitalized since your last visit?  No          Health Maintenance items with a due date reviewed with patient:  Health Maintenance Due   Topic Date Due    Hepatitis C Screening  1965    DTaP/Tdap/Td series (1 - Tdap) 05/01/1984    FOBT Q 1 YEAR AGE 50-75  05/01/2013    Pneumococcal 19-64 Highest Risk (2 of 3 - PCV13) 05/21/2013

## 2017-05-23 NOTE — PROGRESS NOTES
HISTORY OF PRESENT ILLNESS  Latrice Frey is a 45 y.o. male here for evaluation of abdominal pain with loose stools and diarrhea. Patient states that 3 days ago he developed loose stools up to 4-5 a day for 2 days. He denies any abdominal pain. Patient states that his stools were darker than usual.  Symptoms have now resolved. .  Diarrhea    The history is provided by the patient. This is a new problem. The problem has been gradually improving. There has been no fever. The stool consistency is described as watery. Associated symptoms include abdominal pain. Pertinent negatives include no chills, no sweats, no headaches, no arthralgias, no myalgias, no cough, no anal bleeding and no back pain. He has tried nothing for the symptoms. His past medical history does not include irritable bowel syndrome, inflammatory bowel disease, short gut syndrome, bowel resection, recent abdominal surgery, malabsorption, gastric bypass, nursing home resident or small bowel obstruction. Anal Bleeding   The history is provided by the patient and medical records. This is a chronic problem. The problem has been gradually improving. Associated symptoms include abdominal pain. Pertinent negatives include no chest pain, no headaches and no shortness of breath. Nothing aggravates the symptoms. Nothing relieves the symptoms. He has tried nothing for the symptoms. Allergies   Allergen Reactions    Pcn [Penicillins] Anaphylaxis and Angioedema    Coconut Swelling     Current Outpatient Prescriptions on File Prior to Visit   Medication Sig Dispense Refill    losartan (COZAAR) 100 mg tablet Take 1 Tab by mouth daily. 30 Tab 6    NIFEdipine ER (PROCARDIA XL) 60 mg ER tablet TAKE ONE TABLET BY MOUTH ONCE DAILY 30 Tab 6    hydrALAZINE (APRESOLINE) 25 mg tablet TAKE ONE TABLET BY MOUTH THREE TIMES DAILY 90 Tab 3    furosemide (LASIX) 20 mg tablet Take 1 Tab by mouth two (2) times a day.  90 Tab 1    atorvastatin (LIPITOR) 20 mg tablet TAKE ONE TABLET BY MOUTH ONCE DAILY 30 Tab 6    spironolactone (ALDACTONE) 25 mg tablet Take one half tab daily 30 Tab 6    potassium chloride (KLOR-CON M10) 10 mEq tablet TAKE TWO TABLETS BY MOUTH ONCE DAILY 60 Tab 3    carvedilol (COREG) 25 mg tablet TAKE ONE TABLET BY MOUTH TWICE DAILY WITH  MEALS 180 Tab 1    febuxostat (ULORIC) 40 mg tab tablet Take 2 Tabs by mouth daily. 90 Tab 3    diclofenac (VOLTAREN) 1 % gel Apply 4 g to affected area four (4) times daily. 100 g 0    MISCELLANEOUS MEDICAL SUPPLY (COMPRESSION STOCKINGS) by Does Not Apply route. Wear while awake. Remove at HS.  LORazepam (ATIVAN) 1 mg tablet Take 1 Tab by mouth every eight (8) hours as needed. Max Daily Amount: 3 mg. Indications: ANXIETY, INSOMNIA 90 Tab 0    ARIPiprazole (ABILIFY) 5 mg tablet Take 5 mg by mouth daily.  aspirin delayed-release 81 mg tablet Take 81 mg by mouth daily. No current facility-administered medications on file prior to visit.       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     Cardiomyopathy [facility]     CHF (congestive heart failure) (HCC)     CVA (cerebral infarction)     Depression     ED (erectile dysfunction)     Gastritis     Gout     HTN (hypertension)     Hyperlipemia, mixed     Hypoxia     Kidney disease, chronic, stage III (moderate, EGFR 30-59 ml/min)     Pre-diabetes     Sleep apnea     Vitamin D deficiency      Past Surgical History:   Procedure Laterality Date    HX ACL RECONSTRUCTION      HX APPENDECTOMY  2004    became septic from a rupture     Family History   Problem Relation Age of Onset    No Known Problems Mother     Hypertension Father     Diabetes Father     Cancer Father      prostate    No Known Problems Sister     Hypertension Maternal Grandmother     No Known Problems Maternal Grandfather     Alcohol abuse Paternal Grandmother     Bipolar Disorder Paternal Grandmother     No Known Problems Paternal Grandfather      Current Outpatient Prescriptions on File Prior to Visit   Medication Sig Dispense Refill    losartan (COZAAR) 100 mg tablet Take 1 Tab by mouth daily. 30 Tab 6    NIFEdipine ER (PROCARDIA XL) 60 mg ER tablet TAKE ONE TABLET BY MOUTH ONCE DAILY 30 Tab 6    hydrALAZINE (APRESOLINE) 25 mg tablet TAKE ONE TABLET BY MOUTH THREE TIMES DAILY 90 Tab 3    furosemide (LASIX) 20 mg tablet Take 1 Tab by mouth two (2) times a day. 90 Tab 1    atorvastatin (LIPITOR) 20 mg tablet TAKE ONE TABLET BY MOUTH ONCE DAILY 30 Tab 6    spironolactone (ALDACTONE) 25 mg tablet Take one half tab daily 30 Tab 6    potassium chloride (KLOR-CON M10) 10 mEq tablet TAKE TWO TABLETS BY MOUTH ONCE DAILY 60 Tab 3    carvedilol (COREG) 25 mg tablet TAKE ONE TABLET BY MOUTH TWICE DAILY WITH  MEALS 180 Tab 1    febuxostat (ULORIC) 40 mg tab tablet Take 2 Tabs by mouth daily. 90 Tab 3    diclofenac (VOLTAREN) 1 % gel Apply 4 g to affected area four (4) times daily. 100 g 0    MISCELLANEOUS MEDICAL SUPPLY (COMPRESSION STOCKINGS) by Does Not Apply route. Wear while awake. Remove at HS.  LORazepam (ATIVAN) 1 mg tablet Take 1 Tab by mouth every eight (8) hours as needed. Max Daily Amount: 3 mg. Indications: ANXIETY, INSOMNIA 90 Tab 0    ARIPiprazole (ABILIFY) 5 mg tablet Take 5 mg by mouth daily.  aspirin delayed-release 81 mg tablet Take 81 mg by mouth daily. No current facility-administered medications on file prior to visit. Review of Systems   Constitutional: Negative. Negative for chills. Respiratory: Negative. Negative for cough and shortness of breath. Cardiovascular: Negative for chest pain. Gastrointestinal: Positive for abdominal pain, diarrhea and melena. Negative for anal bleeding. Musculoskeletal: Negative. Negative for arthralgias, back pain and myalgias. Neurological: Negative. Negative for headaches. Endo/Heme/Allergies: Negative. Psychiatric/Behavioral: Negative. Visit Vitals    /90 (BP 1 Location: Right arm, BP Patient Position: Sitting)    Pulse (!) 56    Temp 98.9 °F (37.2 °C) (Oral)    Resp 16    Ht 5' 7\" (1.702 m)    Wt 280 lb (127 kg)    SpO2 97%    BMI 43.85 kg/m2         Physical Exam   Constitutional: He is oriented to person, place, and time. He appears well-developed and well-nourished. HENT:   Head: Normocephalic and atraumatic. Cardiovascular: Normal rate, regular rhythm and intact distal pulses. Exam reveals no gallop and no friction rub. No murmur heard. Pulmonary/Chest: Effort normal and breath sounds normal. No respiratory distress. He has no wheezes. He has no rales. Abdominal: Soft. Bowel sounds are normal. He exhibits no distension and no mass. There is no tenderness. There is no rebound and no guarding. Musculoskeletal: Normal range of motion. He exhibits no edema. Neurological: He is alert and oriented to person, place, and time. No cranial nerve deficit. Coordination normal.   Psychiatric: He has a normal mood and affect. His behavior is normal. Thought content normal.   Nursing note and vitals reviewed. ASSESSMENT and PLAN    ICD-10-CM ICD-9-CM    1. Diarrhea, unspecified type R19.7 787.91 CBC WITH AUTOMATED DIFF      OCCULT BLOOD, IMMUNOASSAY (FIT)      METABOLIC PANEL, BASIC   2. Melena K92.1 578.1 CBC WITH AUTOMATED DIFF      OCCULT BLOOD, IMMUNOASSAY (FIT)     Follow-up Disposition:  Return for keep regular scheduled appointment.

## 2017-05-24 LAB
ABSOLUTE LYMPHOCYTE COUNT, 10803: 2 K/UL (ref 1–4.8)
ANION GAP SERPL CALC-SCNC: 18 MMOL/L
BASOPHILS # BLD: 0 K/UL (ref 0–0.2)
BASOPHILS NFR BLD: 0 % (ref 0–2)
BUN SERPL-MCNC: 26 MG/DL (ref 6–22)
CALCIUM SERPL-MCNC: 8.7 MG/DL (ref 8.4–10.4)
CHLORIDE SERPL-SCNC: 98 MMOL/L (ref 98–110)
CO2 SERPL-SCNC: 22 MMOL/L (ref 20–32)
CREAT SERPL-MCNC: 1.7 MG/DL (ref 0.5–1.2)
EOSINOPHIL # BLD: 0.4 K/UL (ref 0–0.5)
EOSINOPHIL NFR BLD: 5 % (ref 0–6)
ERYTHROCYTE [DISTWIDTH] IN BLOOD BY AUTOMATED COUNT: 13.4 % (ref 10–16)
GFRAA, 66117: 50.5
GFRNA, 66118: 41.6
GLUCOSE SERPL-MCNC: 93 MG/DL (ref 65–99)
GRANULOCYTES,GRANS: 63 % (ref 40–75)
HCT VFR BLD AUTO: 41.7 % (ref 39.3–51.6)
HGB BLD-MCNC: 13.6 G/DL (ref 13.1–17.2)
LYMPHOCYTES, LYMLT: 22 % (ref 27–45)
MCH RBC QN AUTO: 31 PG (ref 26–34)
MCHC RBC AUTO-ENTMCNC: 33 G/DL (ref 32–36)
MCV RBC AUTO: 95 FL (ref 80–95)
MONOCYTES # BLD: 0.9 K/UL (ref 0.1–0.9)
MONOCYTES NFR BLD: 10 % (ref 3–9)
NEUTROPHILS # BLD AUTO: 5.8 K/UL (ref 1.8–7.7)
PLATELET # BLD AUTO: 250 K/UL (ref 140–440)
PMV BLD AUTO: 10.1 FL (ref 6–10.8)
POTASSIUM SERPL-SCNC: 4 MMOL/L (ref 3.5–5.5)
RBC # BLD AUTO: 4.41 M/UL (ref 3.8–5.8)
SODIUM SERPL-SCNC: 138 MMOL/L (ref 133–145)
WBC # BLD AUTO: 9.2 K/UL (ref 4–11)

## 2017-05-25 LAB — HEMOCCULT STL QL IA: NEGATIVE

## 2017-05-25 RX ORDER — FUROSEMIDE 20 MG/1
20 TABLET ORAL 2 TIMES DAILY
Qty: 180 TAB | Refills: 1 | Status: SHIPPED | OUTPATIENT
Start: 2017-05-25 | End: 2017-08-16 | Stop reason: SDUPTHER

## 2017-05-26 ENCOUNTER — TELEPHONE (OUTPATIENT)
Dept: FAMILY MEDICINE CLINIC | Age: 52
End: 2017-05-26

## 2017-06-06 NOTE — TELEPHONE ENCOUNTER
With the exception of his kidney function all labs are within normal limits kidney function is stable but not normal

## 2017-06-08 NOTE — TELEPHONE ENCOUNTER
Patient has been called and notified of results. Patient gave verbal understanding. Closing enocunter.

## 2017-07-27 DIAGNOSIS — E78.5 HYPERLIPIDEMIA: ICD-10-CM

## 2017-07-31 NOTE — TELEPHONE ENCOUNTER
Received call from pt for  RX refill,  Advised has only one dose left     Please advise        Requested Prescriptions     Pending Prescriptions Disp Refills    atorvastatin (LIPITOR) 20 mg tablet [Pharmacy Med Name: ATORVASTATIN 20MG   TAB] 30 Tab 0     Sig: TAKE ONE TABLET BY MOUTH ONCE DAILY

## 2017-08-01 RX ORDER — ATORVASTATIN CALCIUM 20 MG/1
TABLET, FILM COATED ORAL
Qty: 30 TAB | Refills: 0 | Status: SHIPPED | OUTPATIENT
Start: 2017-08-01 | End: 2017-08-09 | Stop reason: SDUPTHER

## 2017-08-09 NOTE — TELEPHONE ENCOUNTER
Pt calling to request medication refill of:    Requested Prescriptions     Pending Prescriptions Disp Refills    carvedilol (COREG) 25 mg tablet 60 Tab 3     Sig: TAKE ONE TABLET BY MOUTH TWICE DAILY WITH  MEALS    atorvastatin (LIPITOR) 20 mg tablet 30 Tab 3     Sig: Take 1 Tab by mouth daily. [facility]. Pt has about 5 tabs remaining. Pts last appt was 05/23/17. Advised pt of 72 hour time frame for refill requests. Please advise.

## 2017-08-10 RX ORDER — ATORVASTATIN CALCIUM 20 MG/1
20 TABLET, FILM COATED ORAL DAILY
Qty: 30 TAB | Refills: 3 | Status: SHIPPED | OUTPATIENT
Start: 2017-08-10 | End: 2017-08-22 | Stop reason: SDUPTHER

## 2017-08-10 RX ORDER — CARVEDILOL 25 MG/1
TABLET ORAL
Qty: 60 TAB | Refills: 3 | Status: SHIPPED | OUTPATIENT
Start: 2017-08-10 | End: 2017-12-10 | Stop reason: SDUPTHER

## 2017-08-10 NOTE — TELEPHONE ENCOUNTER
Dr. Shepherd Click, please see refill request for patient, thank you! Requested Prescriptions     Pending Prescriptions Disp Refills    carvedilol (COREG) 25 mg tablet 60 Tab 3     Sig: TAKE ONE TABLET BY MOUTH TWICE DAILY WITH  MEALS    atorvastatin (LIPITOR) 20 mg tablet 30 Tab 3     Sig: Take 1 Tab by mouth daily.

## 2017-08-16 NOTE — TELEPHONE ENCOUNTER
Recd call from pt. Advises has 4 days of meds left    Pt has appt  09/5/2017    Pharmacy is [facility]      Requested Prescriptions     Pending Prescriptions Disp Refills    furosemide (LASIX) 20 mg tablet 180 Tab 1     Sig: Take 1 Tab by mouth two (2) times a day.

## 2017-08-17 RX ORDER — FUROSEMIDE 20 MG/1
20 TABLET ORAL 2 TIMES DAILY
Qty: 180 TAB | Refills: 1 | Status: SHIPPED | OUTPATIENT
Start: 2017-08-17 | End: 2017-08-25 | Stop reason: SDUPTHER

## 2017-08-22 RX ORDER — ATORVASTATIN CALCIUM 20 MG/1
20 TABLET, FILM COATED ORAL DAILY
Qty: 30 TAB | Refills: 0 | Status: SHIPPED | OUTPATIENT
Start: 2017-08-22 | End: 2019-11-11

## 2017-08-22 NOTE — TELEPHONE ENCOUNTER
Requested Prescriptions     Pending Prescriptions Disp Refills    atorvastatin (LIPITOR) 20 mg tablet 30 Tab 3     Sig: Take 1 Tab by mouth daily.

## 2017-08-25 ENCOUNTER — OFFICE VISIT (OUTPATIENT)
Dept: FAMILY MEDICINE CLINIC | Age: 52
End: 2017-08-25

## 2017-08-25 VITALS
HEIGHT: 67 IN | DIASTOLIC BLOOD PRESSURE: 72 MMHG | HEART RATE: 71 BPM | WEIGHT: 280.8 LBS | TEMPERATURE: 99.3 F | BODY MASS INDEX: 44.07 KG/M2 | SYSTOLIC BLOOD PRESSURE: 124 MMHG | RESPIRATION RATE: 16 BRPM

## 2017-08-25 DIAGNOSIS — R60.0 LOCALIZED EDEMA: ICD-10-CM

## 2017-08-25 DIAGNOSIS — J01.80 OTHER ACUTE SINUSITIS, RECURRENCE NOT SPECIFIED: Primary | ICD-10-CM

## 2017-08-25 RX ORDER — FUROSEMIDE 20 MG/1
20 TABLET ORAL 2 TIMES DAILY
Qty: 180 TAB | Refills: 1 | Status: SHIPPED | OUTPATIENT
Start: 2017-08-25 | End: 2018-05-14 | Stop reason: SDUPTHER

## 2017-08-25 RX ORDER — CLINDAMYCIN HYDROCHLORIDE 300 MG/1
300 CAPSULE ORAL 4 TIMES DAILY
Qty: 40 CAP | Refills: 0 | Status: SHIPPED | OUTPATIENT
Start: 2017-08-25 | End: 2017-09-04

## 2017-08-25 RX ORDER — FUROSEMIDE 40 MG/1
TABLET ORAL
Qty: 21 TAB | Refills: 0 | Status: SHIPPED | OUTPATIENT
Start: 2017-08-25 | End: 2017-09-25 | Stop reason: SDUPTHER

## 2017-08-25 NOTE — PROGRESS NOTES
Melina Decker is a 45 y.o. male presents to office for cough. 1. Have you been to the ER, urgent care clinic or hospitalized since your last visit? no  2. Have you seen any other providers outside of [facility] since your last visit? no  3.  Have you had a Flu shot this year? no      Health Maintenance items with a due date reviewed with patient:  Health Maintenance Due   Topic Date Due    Hepatitis C Screening  1965    DTaP/Tdap/Td series (1 - Tdap) 05/01/1984    Pneumococcal 19-64 Highest Risk (2 of 3 - PCV13) 05/13/2013    INFLUENZA AGE 9 TO ADULT  08/01/2017

## 2017-08-26 NOTE — PATIENT INSTRUCTIONS
Heart Failure: Care Instructions  Your Care Instructions    Heart failure occurs when your heart does not pump as much blood as the body needs. Failure does not mean that the heart has stopped pumping but rather that it is not pumping as well as it should. Over time, this causes fluid buildup in your lungs and other parts of your body. Fluid buildup can cause shortness of breath, fatigue, swollen ankles, and other problems. By taking medicines regularly, reducing sodium (salt) in your diet, checking your weight every day, and making lifestyle changes, you can feel better and live longer. Follow-up care is a key part of your treatment and safety. Be sure to make and go to all appointments, and call your doctor if you are having problems. It's also a good idea to know your test results and keep a list of the medicines you take. How can you care for yourself at home? Medicines  · Be safe with medicines. Take your medicines exactly as prescribed. Call your doctor if you think you are having a problem with your medicine. · Do not take any vitamins, over-the-counter medicine, or herbal products without talking to your doctor first. Reji Blanco not take ibuprofen (Advil or Motrin) and naproxen (Aleve) without talking to your doctor first. They could make your heart failure worse. · You may be taking some of the following medicine. ¨ Beta-blockers can slow heart rate, decrease blood pressure, and improve your condition. Taking a beta-blocker may lower your chance of needing to be hospitalized. ¨ Angiotensin-converting enzyme inhibitors (ACEIs) reduce the heart's workload, lower blood pressure, and reduce swelling. Taking an ACEI may lower your chance of needing to be hospitalized again. ¨ Angiotensin II receptor blockers (ARBs) work like ACEIs. Your doctor may prescribe them instead of ACEIs. ¨ Diuretics, also called water pills, reduce swelling.   ¨ Potassium supplements replace this important mineral, which is sometimes lost with diuretics. ¨ Aspirin and other blood thinners prevent blood clots, which can cause a stroke or heart attack. You will get more details on the specific medicines your doctor prescribes. Diet  · Your doctor may suggest that you limit sodium to 2,000 milligrams (mg) a day or less. That is less than 1 teaspoon of salt a day, including all the salt you eat in cooking or in packaged foods. People get most of their sodium from processed foods. Fast food and restaurant meals also tend to be very high in sodium. · Ask your doctor how much liquid you can drink each day. You may have to limit liquids. Weight  · Weigh yourself without clothing at the same time each day. Record your weight. Call your doctor if you have a sudden weight gain, such as more than 2 to 3 pounds in a day or 5 pounds in a week. (Your doctor may suggest a different range of weight gain.) A sudden weight gain may mean that your heart failure is getting worse. Activity level  · Start light exercise (if your doctor says it is okay). Even if you can only do a small amount, exercise will help you get stronger, have more energy, and manage your weight and your stress. Walking is an easy way to get exercise. Start out by walking a little more than you did before. Bit by bit, increase the amount you walk. · When you exercise, watch for signs that your heart is working too hard. You are pushing yourself too hard if you cannot talk while you are exercising. If you become short of breath or dizzy or have chest pain, stop, sit down, and rest.  · If you feel \"wiped out\" the day after you exercise, walk slower or for a shorter distance until you can work up to a better pace. · Get enough rest at night. Sleeping with 1 or 2 pillows under your upper body and head may help you breathe easier. Lifestyle changes  · Do not smoke. Smoking can make a heart condition worse.  If you need help quitting, talk to your doctor about stop-smoking programs and medicines. These can increase your chances of quitting for good. Quitting smoking may be the most important step you can take to protect your heart. · Limit alcohol to 2 drinks a day for men and 1 drink a day for women. Too much alcohol can cause health problems. · Avoid getting sick from colds and the flu. Get a pneumococcal vaccine shot. If you have had one before, ask your doctor whether you need another dose. Get a flu shot each year. If you must be around people with colds or the flu, wash your hands often. When should you call for help? Call 911 if you have symptoms of sudden heart failure such as:  · You have severe trouble breathing. · You cough up pink, foamy mucus. · You have a new irregular or rapid heartbeat. Call your doctor now or seek immediate medical care if:  · You have new or increased shortness of breath. · You are dizzy or lightheaded, or you feel like you may faint. · You have sudden weight gain, such as more than 2 to 3 pounds in a day or 5 pounds in a week. (Your doctor may suggest a different range of weight gain.)  · You have increased swelling in your legs, ankles, or feet. · You are suddenly so tired or weak that you cannot do your usual activities. Watch closely for changes in your health, and be sure to contact your doctor if:  · You develop new symptoms. Where can you learn more? Go to http://teddy-wiliam.info/. Enter E229 in the search box to learn more about \"Heart Failure: Care Instructions. \"  Current as of: April 3, 2017  Content Version: 11.3  © 7929-9529 [employer]. Care instructions adapted under license by [employer] (which disclaims liability or warranty for this information). If you have questions about a medical condition or this instruction, always ask your healthcare professional. [address] any warranty or liability for your use of this information.        Sinusitis: Care Instructions  Your Care Instructions    Sinusitis is an infection of the lining of the sinus cavities in your head. Sinusitis often follows a cold. It causes pain and pressure in your head and face. In most cases, sinusitis gets better on its own in 1 to 2 weeks. But some mild symptoms may last for several weeks. Sometimes antibiotics are needed. Follow-up care is a key part of your treatment and safety. Be sure to make and go to all appointments, and call your doctor if you are having problems. It's also a good idea to know your test results and keep a list of the medicines you take. How can you care for yourself at home? · Take an over-the-counter pain medicine, such as acetaminophen (Tylenol), ibuprofen (Advil, Motrin), or naproxen (Aleve). Read and follow all instructions on the label. · If the doctor prescribed antibiotics, take them as directed. Do not stop taking them just because you feel better. You need to take the full course of antibiotics. · Be careful when taking over-the-counter cold or flu medicines and Tylenol at the same time. Many of these medicines have acetaminophen, which is Tylenol. Read the labels to make sure that you are not taking more than the recommended dose. Too much acetaminophen (Tylenol) can be harmful. · Breathe warm, moist air from a steamy shower, a hot bath, or a sink filled with hot water. Avoid cold, dry air. Using a humidifier in your home may help. Follow the directions for cleaning the machine. · Use saline (saltwater) nasal washes to help keep your nasal passages open and wash out mucus and bacteria. You can buy saline nose drops at a grocery store or drugstore. Or you can make your own at home by adding 1 teaspoon of salt and 1 teaspoon of baking soda to 2 cups of distilled water. If you make your own, fill a bulb syringe with the solution, insert the tip into your nostril, and squeeze gently. Verlee Leader your nose.   · Put a hot, wet towel or a warm gel pack on your face 3 or 4 times a day for 5 to 10 minutes each time. · Try a decongestant nasal spray like oxymetazoline (Afrin). Do not use it for more than 3 days in a row. Using it for more than 3 days can make your congestion worse. When should you call for help? Call your doctor now or seek immediate medical care if:  · You have new or worse swelling or redness in your face or around your eyes. · You have a new or higher fever. Watch closely for changes in your health, and be sure to contact your doctor if:  · You have new or worse facial pain. · The mucus from your nose becomes thicker (like pus) or has new blood in it. · You are not getting better as expected. Where can you learn more? Go to http://teddy-wiliam.info/. Enter U445 in the search box to learn more about \"Sinusitis: Care Instructions. \"  Current as of: July 29, 2016  Content Version: 11.3  © 3599-6030 [employer]. Care instructions adapted under license by [employer] (which disclaims liability or warranty for this information). If you have questions about a medical condition or this instruction, always ask your healthcare professional. [place] [address] any warranty or liability for your use of this information. Leg and Ankle Edema: Care Instructions  Your Care Instructions  Swelling in the legs, ankles, and feet is called edema. It is common after you sit or stand for a while. Long plane flights or car rides often cause swelling in the legs and feet. You may also have swelling if you have to stand for long periods of time at your job. Problems with the veins in the legs (varicose veins) and changes in hormones can also cause swelling. Sometimes the swelling in the ankles and feet is caused by a more serious problem, such as heart failure, infection, blood clots, or liver or kidney disease. Follow-up care is a key part of your treatment and safety.  Be sure to make and go to all appointments, and call your doctor if you are having problems. Its also a good idea to know your test results and keep a list of the medicines you take. How can you care for yourself at home? · If your doctor gave you medicine, take it as prescribed. Call your doctor if you think you are having a problem with your medicine. · Whenever you are resting, raise your legs up. Try to keep the swollen area higher than the level of your heart. · Take breaks from standing or sitting in one position. ¨ Walk around to increase the blood flow in your lower legs. ¨ Move your feet and ankles often while you stand, or tighten and relax your leg muscles. · Wear support stockings. Put them on in the morning, before swelling gets worse. · Eat a balanced diet. Lose weight if you need to. · Limit the amount of salt (sodium) in your diet. Salt holds fluid in the body and may increase swelling. When should you call for help? Call 911 anytime you think you may need emergency care. For example, call if:  · You have symptoms of a blood clot in your lung (called a pulmonary embolism). These may include:  ¨ Sudden chest pain. ¨ Trouble breathing. ¨ Coughing up blood. Call your doctor now or seek immediate medical care if:  · You have signs of a blood clot, such as:  ¨ Pain in your calf, back of the knee, thigh, or groin. ¨ Redness and swelling in your leg or groin. · You have symptoms of infection, such as:  ¨ Increased pain, swelling, warmth, or redness. ¨ Red streaks or pus. ¨ A fever. Watch closely for changes in your health, and be sure to contact your doctor if:  · Your swelling is getting worse. · You have new or worsening pain in your legs. · You do not get better as expected. Where can you learn more? Go to http://teddy-wiliam.info/. Enter H741 in the search box to learn more about \"Leg and Ankle Edema: Care Instructions. \"  Current as of: March 20, 2017  Content Version: 11.3  © 2816-1254 [employer]. Care instructions adapted under license by [employer] (which disclaims liability or warranty for this information). If you have questions about a medical condition or this instruction, always ask your healthcare professional. [address] any warranty or liability for your use of this information.

## 2017-08-26 NOTE — PROGRESS NOTES
Kings Hurt is a 45 y.o.  male and presents with a week hx of post nasal drip, mucopurulent nasal discharge and congestion shortly after a bout of   Sinusitis treated with abx. No chronic URI or LRI. Recent flare up of edema as well and refill of lasix as maintenance med. CHF/pulm edema hx. 10 pound weight gain per patient. Chief Complaint   Patient presents with    Cough     Subjective: Additional Concerns: none     Patient Active Problem List    Diagnosis Date Noted    CKD (chronic kidney disease) stage 3, GFR 30-59 ml/min 02/27/2017    Osteoarthritis of knee 06/17/2016    Acute pulmonary edema ([facility] [address] 04/22/2016    Bipolar disorder, current episode mixed, moderate ([facility] [address] 03/17/2016    Chronic renal disease 11/17/2015    Left knee pain 10/30/2015    Essential hypertension 08/02/2015    Lithium use 08/02/2015    CHF (congestive heart failure) (HCC)     Gout     Cardiomyopathy ([facility] [address]     Sleep apnea     Pre-diabetes     Hyperlipemia, mixed     HTN (hypertension)     Vitamin D deficiency      Current Outpatient Prescriptions   Medication Sig Dispense Refill    furosemide (LASIX) 20 mg tablet Take 1 Tab by mouth two (2) times a day. 180 Tab 1    furosemide (LASIX) 40 mg tablet Take one tab po BID x 7 days 21 Tab 0    clindamycin (CLEOCIN) 300 mg capsule Take 1 Cap by mouth four (4) times daily for 10 days. 40 Cap 0    atorvastatin (LIPITOR) 20 mg tablet Take 1 Tab by mouth daily. 30 Tab 0    carvedilol (COREG) 25 mg tablet TAKE ONE TABLET BY MOUTH TWICE DAILY WITH  MEALS 60 Tab 3    losartan (COZAAR) 100 mg tablet Take 1 Tab by mouth daily.  30 Tab 6    NIFEdipine ER (PROCARDIA XL) 60 mg ER tablet TAKE ONE TABLET BY MOUTH ONCE DAILY 30 Tab 6    hydrALAZINE (APRESOLINE) 25 mg tablet TAKE ONE TABLET BY MOUTH THREE TIMES DAILY 90 Tab 3    spironolactone (ALDACTONE) 25 mg tablet Take one half tab daily 30 Tab 6    MISCELLANEOUS MEDICAL SUPPLY (COMPRESSION STOCKINGS) by Does Not Apply route. Wear while awake. Remove at HS.  LORazepam (ATIVAN) 1 mg tablet Take 1 Tab by mouth every eight (8) hours as needed. Max Daily Amount: 3 mg. Indications: ANXIETY, INSOMNIA 90 Tab 0    ARIPiprazole (ABILIFY) 5 mg tablet Take 5 mg by mouth daily.  aspirin delayed-release 81 mg tablet Take 81 mg by mouth daily.  potassium chloride (KLOR-CON M10) 10 mEq tablet TAKE TWO TABLETS BY MOUTH ONCE DAILY 60 Tab 3    febuxostat (ULORIC) 40 mg tab tablet Take 2 Tabs by mouth daily. 90 Tab 3    diclofenac (VOLTAREN) 1 % gel Apply 4 g to affected area four (4) times daily.  100 g 0     Allergies   Allergen Reactions    Pcn [Penicillins] Anaphylaxis and Angioedema    Coconut Swelling     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 (HCC)     Cardiomyopathy ([facility] [address]     CHF (congestive heart failure) (HCC)     CVA (cerebral infarction)     Depression     ED (erectile dysfunction)     Gastritis     Gout     HTN (hypertension)     Hyperlipemia, mixed     Hypoxia     Kidney disease, chronic, stage III (moderate, EGFR 30-59 ml/min)     Pre-diabetes     Sleep apnea     Vitamin D deficiency      Past Surgical History:   Procedure Laterality Date    HX ACL RECONSTRUCTION      HX APPENDECTOMY  2004    became septic from a rupture     Family History   Problem Relation Age of Onset    No Known Problems Mother     Hypertension Father     Diabetes Father     Cancer Father      prostate    No Known Problems Sister     Hypertension Maternal Grandmother     No Known Problems Maternal Grandfather     Alcohol abuse Paternal Grandmother     Bipolar Disorder Paternal Grandmother     No Known Problems Paternal Grandfather      Social History   Substance Use Topics    Smoking status: Never Smoker    Smokeless tobacco: Never Used    Alcohol use No      Comment: holidays/ occasions     ROS     General: negative for - chills, fatigue, fever, weight change  ENT: negative for - headaches, hearing change, positive nasal congestion, no oral lesions, sneezing or sore throat  Resp: negative for - cough, shortness of breath or wheezing  CV: negative for - chest pain, edema or palpitations  GI: negative for - abdominal pain, change in bowel habits, constipation, diarrhea or nausea/vomiting  : negative for - dysuria, hematuria, incontinence, pelvic pain or vulvar/vaginal symptoms  MSK: negative for - joint pain, joint swelling or muscle pain  Neuro: negative for - confusion, headaches, seizures or weakness  Derm: negative for - dry skin, hair changes, rash or skin lesion changes    Objective:  Vitals:    08/25/17 1645   BP: 124/72   Pulse: 71   Resp: 16   Temp: 99.3 °F (37.4 °C)   TempSrc: Oral   Weight: 280 lb 12.8 oz (127.4 kg)   Height: 5' 7\" (1.702 m)   PainSc:   0 - No pain     PE    Alert, well appearing, and in no distress, oriented to person, place, and time and overweight  Mental status - alert, oriented to person, place, and time, normal mood, behavior, speech, dress, motor activity, and thought processes  Eyes - pupils equal and reactive, extraocular eye movements intact  Ears - bilateral TM's and external ear canals normal, hearing grossly normal bilaterally  Mouth - mucous membranes moist, pharynx normal without lesions, tonsils normal, dental hygiene good and tongue normal  Lymphatics - no palpable lymphadenopathy  Chest - clear to auscultation, no wheezes, rales or rhonchi, symmetric air entry  Heart - normal rate, regular rhythm, normal S1, S2, no murmurs, rubs, clicks or gallops  Extremities 2+ edema bilateral  LABS   Office Visit on 05/23/2017   Component Date Value Ref Range Status    WBC 05/23/2017 9.2  4.0 - 11.0 K/uL Final    RBC 05/23/2017 4.41  3.80 - 5.80 M/uL Final    HGB 05/23/2017 13.6  13.1 - 17.2 g/dL Final    HCT 05/23/2017 41.7  39.3 - 51.6 % Final    MCV 05/23/2017 95  80 - 95 fL Final    MCH 05/23/2017 31  26 - 34 pg Final    MCHC 05/23/2017 33  32 - 36 g/dL Final    RDW 05/23/2017 13.4  10.0 - 16.0 % Final    PLATELET 68/65/4032 491  140 - 440 K/uL Final    MPV 05/23/2017 10.1  6.0 - 10.8 fL Final    NEUTROPHILS 05/23/2017 63  40 - 75 % Final    Lymphocytes 05/23/2017 22* 27 - 45 % Final    MONOCYTES 05/23/2017 10* 3 - 9 % Final    EOSINOPHILS 05/23/2017 5  0 - 6 % Final    BASOPHILS 05/23/2017 0  0 - 2 % Final    ABS. NEUTROPHILS 05/23/2017 5.8  1.8 - 7.7 K/uL Final    ABSOLUTE LYMPHOCYTE COUNT 05/23/2017 2.0  1.0 - 4.8 K/uL Final    ABS. MONOCYTES 05/23/2017 0.9  0.1 - 0.9 K/uL Final    ABS. EOSINOPHILS 05/23/2017 0.4  0.0 - 0.5 K/uL Final    ABS. BASOPHILS 05/23/2017 0.0  0.0 - 0.2 K/uL Final    Occult Blood fecal, by IA 05/23/2017 Negative  Negative Final    Glucose 05/23/2017 93  65 - 99 mg/dL Final    BUN 05/23/2017 26* 6 - 22 mg/dL Final    Creatinine 05/23/2017 1.7* 0.5 - 1.2 mg/dL Final    Sodium 05/23/2017 138  133 - 145 mmol/L Final    Potassium 05/23/2017 4.0  3.5 - 5.5 mmol/L Final    Chloride 05/23/2017 98  98 - 110 mmol/L Final    CO2 05/23/2017 22  20 - 32 mmol/L Final    Calcium 05/23/2017 8.7  8.4 - 10.4 mg/dL Final    Anion gap 05/23/2017 18.0  mmol/L Final    Comment: Test includes BUN, CO2, Chloride, Creatinine, Glucose, Potassium, Calcium and  Sodium. Estimated GFR results are reported in mL/min/1.73 sq.m. by the MDRD equation. This eGFR is validated for stable chronic renal failure patients. This   equation  is unreliable in acute illness or patients with normal renal function.       GFRAA 05/23/2017 50.5* >60.0 Final    GFRNA 05/23/2017 41.6* >60.0 Final   Office Visit on 02/27/2017   Component Date Value Ref Range Status    TSH 02/27/2017 1.89  0.27 - 4.20 mcU/mL Final    Glucose 02/27/2017 98  65 - 99 mg/dL Final    BUN 02/27/2017 16  6 - 22 mg/dL Final    Creatinine 02/27/2017 1.4* 0.5 - 1.2 mg/dL Final    Sodium 02/27/2017 145  133 - 145 mmol/L Final    Potassium 02/27/2017 4.1  3.5 - 5.5 mmol/L Final    Chloride 02/27/2017 101  98 - 110 mmol/L Final    CO2 02/27/2017 23  20 - 32 mmol/L Final    Calcium 02/27/2017 8.8  8.4 - 10.4 mg/dL Final    Anion gap 02/27/2017 21.0  mmol/L Final    Comment: Test includes BUN, CO2, Chloride, Creatinine, Glucose, Potassium, Calcium and  Sodium. Estimated GFR results are reported in mL/min/1.73 sq.m. by the MDRD equation. This eGFR is validated for stable chronic renal failure patients. This   equation  is unreliable in acute illness or patients with normal renal function.  GFRAA 02/27/2017 >60.0  >60.0 Final    GFRNA 02/27/2017 51.7* >60.0 Final       TESTS  Results for orders placed or performed in visit on 05/23/17   CBC WITH AUTOMATED DIFF   Result Value Ref Range    WBC 9.2 4.0 - 11.0 K/uL    RBC 4.41 3.80 - 5.80 M/uL    HGB 13.6 13.1 - 17.2 g/dL    HCT 41.7 39.3 - 51.6 %    MCV 95 80 - 95 fL    MCH 31 26 - 34 pg    MCHC 33 32 - 36 g/dL    RDW 13.4 10.0 - 16.0 %    PLATELET 435 944 - 713 K/uL    MPV 10.1 6.0 - 10.8 fL    NEUTROPHILS 63 40 - 75 %    Lymphocytes 22 (L) 27 - 45 %    MONOCYTES 10 (H) 3 - 9 %    EOSINOPHILS 5 0 - 6 %    BASOPHILS 0 0 - 2 %    ABS. NEUTROPHILS 5.8 1.8 - 7.7 K/uL    ABSOLUTE LYMPHOCYTE COUNT 2.0 1.0 - 4.8 K/uL    ABS. MONOCYTES 0.9 0.1 - 0.9 K/uL    ABS. EOSINOPHILS 0.4 0.0 - 0.5 K/uL    ABS. BASOPHILS 0.0 0.0 - 0.2 K/uL   OCCULT BLOOD, IMMUNOASSAY (FIT)   Result Value Ref Range    Occult Blood fecal, by IA Negative Negative   METABOLIC PANEL, BASIC   Result Value Ref Range    Glucose 93 65 - 99 mg/dL    BUN 26 (H) 6 - 22 mg/dL    Creatinine 1.7 (H) 0.5 - 1.2 mg/dL    Sodium 138 133 - 145 mmol/L    Potassium 4.0 3.5 - 5.5 mmol/L    Chloride 98 98 - 110 mmol/L    CO2 22 20 - 32 mmol/L    Calcium 8.7 8.4 - 10.4 mg/dL    Anion gap 18.0 mmol/L    GFRAA 50.5 (L) >60.0    GFRNA 41.6 (L) >60.0       Assessment/Plan:      1. CHF/pulm edema - peripheral edema worsening.  Short term burst of Lasix given x 1 with his regular lasix dosing refilled. F/U with PCP in 2 weeks with  Lab work. 2. Acute sinusitis possible treatment  failure last time- Changed abx to Clinda x 10 days. Lab review: no lab studies available for review at time of visit. I have discussed the diagnosis with the patient and the intended plan as seen in the above orders. The patient has received an after-visit summary and questions were answered concerning future plans. I have discussed medication side effects and warnings with the patient as well. I have reviewed the plan of care with the patient, accepted their input and they are in agreement with the treatment goals. Follow-up Disposition:  Return in about 2 weeks (around 9/8/2017), or if symptoms worsen or fail to improve.     Noemi Pat MD

## 2017-08-29 ENCOUNTER — TELEPHONE (OUTPATIENT)
Dept: FAMILY MEDICINE CLINIC | Age: 52
End: 2017-08-29

## 2017-08-29 NOTE — TELEPHONE ENCOUNTER
Called pharmacy and per pharmacy lasiz 40mg is unavailable, Dr. Nunes  gave permission to do 20 mg bid for 7 days on top of the regular 20 mg that pt is already taking.

## 2017-09-05 ENCOUNTER — OFFICE VISIT (OUTPATIENT)
Dept: FAMILY MEDICINE CLINIC | Age: 52
End: 2017-09-05

## 2017-09-05 VITALS
RESPIRATION RATE: 16 BRPM | DIASTOLIC BLOOD PRESSURE: 86 MMHG | SYSTOLIC BLOOD PRESSURE: 130 MMHG | OXYGEN SATURATION: 97 % | BODY MASS INDEX: 43.79 KG/M2 | WEIGHT: 279 LBS | HEART RATE: 61 BPM | HEIGHT: 67 IN

## 2017-09-05 DIAGNOSIS — I10 ESSENTIAL HYPERTENSION: ICD-10-CM

## 2017-09-05 DIAGNOSIS — N18.30 CKD (CHRONIC KIDNEY DISEASE) STAGE 3, GFR 30-59 ML/MIN (HCC): ICD-10-CM

## 2017-09-05 DIAGNOSIS — I50.32 CHRONIC DIASTOLIC CONGESTIVE HEART FAILURE (HCC): Primary | ICD-10-CM

## 2017-09-05 DIAGNOSIS — E78.5 HYPERLIPIDEMIA, UNSPECIFIED HYPERLIPIDEMIA TYPE: ICD-10-CM

## 2017-09-05 DIAGNOSIS — I50.32 CHRONIC DIASTOLIC CONGESTIVE HEART FAILURE (HCC): ICD-10-CM

## 2017-09-05 NOTE — PROGRESS NOTES
Kianna Norris is a 45 y.o. male presents to office for follow up on HTN and hyperlipidemia        1. Have you been to the ER, urgent care clinic or hospitalized since your last visit?  No           Health Maintenance items with a due date reviewed with patient:  Health Maintenance Due   Topic Date Due    Hepatitis C Screening  1965    DTaP/Tdap/Td series (1 - Tdap) 05/01/1984    Pneumococcal 19-64 Highest Risk (2 of 3 - PCV13) 05/13/2013    INFLUENZA AGE 9 TO ADULT  08/01/2017

## 2017-09-05 NOTE — MR AVS SNAPSHOT
Visit Information Date & Time Provider Department Dept. Phone Encounter #  
 9/5/2017  4:45 PM Belia Green MD [facility] [place] [phone] [id number] Follow-up Instructions Return in about 4 weeks (around 10/3/2017). Follow-up and Disposition History Your Appointments 9/25/2017  3:30 PM  
Follow Up with Belia Green MD  
Formerly [facility]) Appt Note: Follow up on elevated lab results [address] [facility] [address] [place] [id number]  
[phone]  
  
   
 [facility] [address] [facility] [address] [address]  
  
    
 10/11/2017 10:00 AM  
PHYSICAL with Belia Green MD  
[facility] [place] ([address]) Appt Note: CPE & f/u  
 [address] [facility] [address] [place] [id number]  
[phone]  
  
   
 [address] [address] Upcoming Health Maintenance Date Due Hepatitis C Screening 1965 DTaP/Tdap/Td series (1 - Tdap) 5/1/1984 Pneumococcal 19-64 Highest Risk (2 of 3 - PCV13) 5/13/2013 INFLUENZA AGE 9 TO ADULT 8/1/2017 FOBT Q 1 YEAR AGE 50-75 5/23/2018 Allergies as of 9/5/2017  Review Complete On: 9/5/2017 By: Belia Green MD  
  
 Severity Noted Reaction Type Reactions Pcn [Penicillins] High 04/01/2015   Side Effect Anaphylaxis, Angioedema Coconut Low 04/01/2015   Side Effect Swelling Current Immunizations  Never Reviewed Name Date Influenza Vaccine 10/17/2015, 10/17/2015 12:00 AM, 11/21/2014 12:00 AM  
 Pneumococcal Polysaccharide (PPSV-23) 5/13/2012 12:00 AM  
  
 Not reviewed this visit You Were Diagnosed With   
  
 Codes Comments Chronic diastolic congestive heart failure (HCC)    -  Primary ICD-10-CM: I50.32 
ICD-9-CM: 428.32, 428.0 Essential hypertension     ICD-10-CM: I10 
ICD-9-CM: 401.9 CKD (chronic kidney disease) stage 3, GFR 30-59 ml/min     ICD-10-CM: N18.3 ICD-9-CM: 557. 3 Hyperlipidemia, unspecified hyperlipidemia type     ICD-10-CM: E78.5 ICD-9-CM: 272.4 Vitals BP Pulse Resp Height(growth percentile) Weight(growth percentile) SpO2  
 130/86 (BP 1 Location: Left arm, BP Patient Position: Sitting) 61 16 5' 7\" (1.702 m) 279 lb (126.6 kg) 97% BMI Smoking Status 43.7 kg/m2 Never Smoker Vitals History BMI and BSA Data Body Mass Index Body Surface Area 43.7 kg/m 2 2.45 m 2 Preferred Pharmacy Pharmacy Name Phone [facility] [address] [facility] [phone] Your Updated Medication List  
  
   
This list is accurate as of: 9/5/17 11:59 PM.  Always use your most recent med list.  
  
  
  
  
 ABILIFY 5 mg tablet Generic drug:  ARIPiprazole Take 5 mg by mouth daily. aspirin delayed-release 81 mg tablet Take 81 mg by mouth daily. atorvastatin 20 mg tablet Commonly known as:  LIPITOR Take 1 Tab by mouth daily. carvedilol 25 mg tablet Commonly known as:  COREG  
TAKE ONE TABLET BY MOUTH TWICE DAILY WITH  MEALS  
  
 COMPRESSION STOCKINGS  
by Does Not Apply route. Wear while awake. Remove at HS. diclofenac 1 % Gel Commonly known as:  VOLTAREN Apply 4 g to affected area four (4) times daily. febuxostat 40 mg Tab tablet Commonly known as:  Antonio Pascual Take 2 Tabs by mouth daily. * furosemide 20 mg tablet Commonly known as:  LASIX Take 1 Tab by mouth two (2) times a day. * furosemide 40 mg tablet Commonly known as:  LASIX Take one tab po BID x 7 days  
  
 hydrALAZINE 25 mg tablet Commonly known as:  APRESOLINE  
TAKE ONE TABLET BY MOUTH THREE TIMES DAILY LORazepam 1 mg tablet Commonly known as:  ATIVAN Take 1 Tab by mouth every eight (8) hours as needed. Max Daily Amount: 3 mg.  Indications: ANXIETY, INSOMNIA  
  
 losartan 100 mg tablet Commonly known as:  COZAAR Take 1 Tab by mouth daily. NIFEdipine ER 60 mg ER tablet Commonly known as:  PROCARDIA XL  
TAKE ONE TABLET BY MOUTH ONCE DAILY potassium chloride 10 mEq tablet Commonly known as:  KLOR-CON M10  
TAKE TWO TABLETS BY MOUTH ONCE DAILY  
  
 spironolactone 25 mg tablet Commonly known as:  ALDACTONE Take one half tab daily * Notice: This list has 2 medication(s) that are the same as other medications prescribed for you. Read the directions carefully, and ask your doctor or other care provider to review them with you. Follow-up Instructions Return in about 4 weeks (around 10/3/2017). To-Do List   
 09/05/2017 ECHO:  2D ECHO COMPLETE ADULT (TTE) W OR WO CONTR   
  
 09/05/2017 Lab:  LIPID PANEL   
  
 09/05/2017 Lab:  METABOLIC PANEL, COMPREHENSIVE Introducing [facility]! John Goff introduces [employer] patient portal. Now you can access parts of your medical record, email your doctor's office, and request medication refills online. 1. In your internet browser, go to [email or web address] 2. Click on the First Time User? Click Here link in the Sign In box. You will see the New Member Sign Up page. 3. Enter your [employer] Access Code exactly as it appears below. You will not need to use this code after youve completed the sign-up process. If you do not sign up before the expiration date, you must request a new code. · [employer] Access Code: [email or web address] Expires: 12/4/2017  5:45 PM 
 
4. Enter the last four digits of your Social Security Number (xxxx) and Date of Birth (mm/dd/yyyy) as indicated and click Submit. You will be taken to the next sign-up page. 5. Create a [employer] ID. This will be your [employer] login ID and cannot be changed, so think of one that is secure and easy to remember. 6. Create a [employer] password. You can change your password at any time. 7. Enter your Password Reset Question and Answer. This can be used at a later time if you forget your password. 8. Enter your e-mail address. You will receive e-mail notification when new information is available in [address] 9. Click Sign Up. You can now view and download portions of your medical record. 10. Click the Download Summary menu link to download a portable copy of your medical information. If you have questions, please visit the Frequently Asked Questions section of the [employer] website. Remember, [employer] is NOT to be used for urgent needs. For medical emergencies, dial 911. Now available from your iPhone and Android! Please provide this summary of care documentation to your next provider. Your primary care clinician is listed as Kaley Riley. If you have any questions after today's visit, please call [phone].

## 2017-09-05 NOTE — PROGRESS NOTES
HISTORY OF PRESENT ILLNESS  Leanne Toth is a 45 y.o. male here for follow-up of hypertension hyperlipidemia and congestive heart failure. . Patient states that he is no longer having any breathing difficulties cough or shortness of breath. Hypertension    The history is provided by the patient and medical records. This is a chronic problem. The problem has been gradually improving. Associated symptoms include peripheral edema. Pertinent negatives include no chest pain, no orthopnea, no headaches, no dizziness and no shortness of breath. There are no associated agents to hypertension. Risk factors include dyslipidemia, obesity and male gender. CHF   The history is provided by the patient and medical records. This is a chronic problem. The problem has been gradually improving. Pertinent negatives include no chest pain, no abdominal pain, no headaches and no shortness of breath. Nothing aggravates the symptoms. The symptoms are relieved by medications. Treatments tried: Carvedilol losartan and Spironolactone and Lasix. The treatment provided significant relief. Cholesterol Problem   The history is provided by the patient and medical records. This is a chronic problem. The problem has been gradually improving. Pertinent negatives include no chest pain, no abdominal pain, no headaches and no shortness of breath. The symptoms are aggravated by eating. The symptoms are relieved by medications. Treatments tried: Lipitor. The treatment provided significant relief. Chronic Kidney Disease   The history is provided by the medical records. This is a chronic problem. The problem has been gradually improving. Pertinent negatives include no chest pain, no abdominal pain, no headaches and no shortness of breath. Nothing aggravates the symptoms. Nothing relieves the symptoms. He has tried nothing for the symptoms.      Allergies   Allergen Reactions    Pcn [Penicillins] Anaphylaxis and Angioedema    Coconut Swelling Current Outpatient Prescriptions on File Prior to Visit   Medication Sig Dispense Refill    furosemide (LASIX) 40 mg tablet Take one tab po BID x 7 days 21 Tab 0    atorvastatin (LIPITOR) 20 mg tablet Take 1 Tab by mouth daily. 30 Tab 0    carvedilol (COREG) 25 mg tablet TAKE ONE TABLET BY MOUTH TWICE DAILY WITH  MEALS 60 Tab 3    losartan (COZAAR) 100 mg tablet Take 1 Tab by mouth daily. 30 Tab 6    NIFEdipine ER (PROCARDIA XL) 60 mg ER tablet TAKE ONE TABLET BY MOUTH ONCE DAILY 30 Tab 6    hydrALAZINE (APRESOLINE) 25 mg tablet TAKE ONE TABLET BY MOUTH THREE TIMES DAILY 90 Tab 3    spironolactone (ALDACTONE) 25 mg tablet Take one half tab daily 30 Tab 6    potassium chloride (KLOR-CON M10) 10 mEq tablet TAKE TWO TABLETS BY MOUTH ONCE DAILY 60 Tab 3    febuxostat (ULORIC) 40 mg tab tablet Take 2 Tabs by mouth daily. 90 Tab 3    diclofenac (VOLTAREN) 1 % gel Apply 4 g to affected area four (4) times daily. 100 g 0    MISCELLANEOUS MEDICAL SUPPLY (COMPRESSION STOCKINGS) by Does Not Apply route. Wear while awake. Remove at HS.  LORazepam (ATIVAN) 1 mg tablet Take 1 Tab by mouth every eight (8) hours as needed. Max Daily Amount: 3 mg. Indications: ANXIETY, INSOMNIA 90 Tab 0    ARIPiprazole (ABILIFY) 5 mg tablet Take 5 mg by mouth daily.  aspirin delayed-release 81 mg tablet Take 81 mg by mouth daily.  furosemide (LASIX) 20 mg tablet Take 1 Tab by mouth two (2) times a day. 180 Tab 1     No current facility-administered medications on file prior to visit.       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 ([facility])     Cardiomyopathy ([facility])     CHF (congestive heart failure) (HCC)     CVA (cerebral infarction)     Depression     ED (erectile dysfunction)     Gastritis     Gout     HTN (hypertension)     Hyperlipemia, mixed     Hypoxia     Kidney disease, chronic, stage III (moderate, EGFR 30-59 ml/min)     Pre-diabetes     Sleep apnea     Vitamin D deficiency      Past Surgical History:   Procedure Laterality Date    HX ACL RECONSTRUCTION      HX APPENDECTOMY  2004    became septic from a rupture     Family History   Problem Relation Age of Onset    No Known Problems Mother     Hypertension Father     Diabetes Father     Cancer Father      prostate    No Known Problems Sister     Hypertension Maternal Grandmother     No Known Problems Maternal Grandfather     Alcohol abuse Paternal Grandmother     Bipolar Disorder Paternal Grandmother     No Known Problems Paternal Grandfather      Social History     Social History    Marital status:      Spouse name: N/A    Number of children: N/A    Years of education: N/A     Occupational History    Not on file. Social History Main Topics    Smoking status: Never Smoker    Smokeless tobacco: Never Used    Alcohol use No      Comment: holidays/ occasions    Drug use: No    Sexual activity: Yes     Partners: Female     Other Topics Concern     Service Yes     [employer]    Blood Transfusions No    Caffeine Concern No    Occupational Exposure No    Hobby Hazards No    Sleep Concern No     sleep apnea, has cpap    Stress Concern Yes     family related    Weight Concern No     Pt has lost 20lbs but is wanting to lose more    Special Diet Yes     no salt    Back Care No    Exercise No    Bike Helmet No    Seat Belt Yes    Self-Exams Yes     Social History Narrative         Review of Systems   Constitutional: Negative. Eyes: Negative. Respiratory: Negative. Negative for shortness of breath. Cardiovascular: Negative. Negative for chest pain and orthopnea. Gastrointestinal: Negative for abdominal pain. Musculoskeletal: Negative. Neurological: Negative. Negative for dizziness and headaches. Endo/Heme/Allergies: Negative. Psychiatric/Behavioral: Negative.       Visit Vitals    /86 (BP 1 Location: Left arm, BP Patient Position: Sitting)    Pulse 61    Resp 16    Ht 5' 7\" (1.702 m)    Wt 279 lb (126.6 kg)    SpO2 97%    BMI 43.7 kg/m2       Physical Exam   Constitutional: He is oriented to person, place, and time. He appears well-developed and well-nourished. HENT:   Head: Normocephalic and atraumatic. Cardiovascular: Normal rate, regular rhythm, normal heart sounds and intact distal pulses. Exam reveals no gallop and no friction rub. No murmur heard. Pulmonary/Chest: Effort normal and breath sounds normal. No respiratory distress. He has no wheezes. He has no rales. Musculoskeletal: Normal range of motion. He exhibits edema. He exhibits no tenderness. Neurological: He is alert and oriented to person, place, and time. No cranial nerve deficit. Coordination normal.   Skin: Skin is warm and dry. No rash noted. No erythema. No pallor. Psychiatric: He has a normal mood and affect. His behavior is normal. Thought content normal.   Nursing note and vitals reviewed. ASSESSMENT and PLAN    ICD-10-CM ICD-9-CM    1. Chronic diastolic congestive heart failure (HCC) I50.32 428.32      428.0    2. Essential hypertension I10 401.9    3. CKD (chronic kidney disease) stage 3, GFR 30-59 ml/min N18.3 585.3    4. Hyperlipidemia, unspecified hyperlipidemia type E78.5 272.4      Follow-up Disposition:  Return in about 4 weeks (around 10/3/2017).

## 2017-09-08 LAB
A-G RATIO,AGRAT: 1.4 RATIO (ref 1.1–2.6)
ALBUMIN SERPL-MCNC: 4.5 G/DL (ref 3.5–5)
ALP SERPL-CCNC: 99 U/L (ref 25–115)
ALT SERPL-CCNC: 18 U/L (ref 5–40)
ANION GAP SERPL CALC-SCNC: 20 MMOL/L
AST SERPL W P-5'-P-CCNC: 17 U/L (ref 10–37)
BILIRUB SERPL-MCNC: 0.4 MG/DL (ref 0.2–1.2)
BUN SERPL-MCNC: 26 MG/DL (ref 6–22)
CALCIUM SERPL-MCNC: 8.9 MG/DL (ref 8.4–10.4)
CHLORIDE SERPL-SCNC: 98 MMOL/L (ref 98–110)
CHOLEST SERPL-MCNC: 106 MG/DL (ref 110–200)
CO2 SERPL-SCNC: 21 MMOL/L (ref 20–32)
CREAT SERPL-MCNC: 2 MG/DL (ref 0.5–1.2)
GFRAA, 66117: 41.9
GFRNA, 66118: 34.6
GLOBULIN,GLOB: 3.2 G/DL (ref 2–4)
GLUCOSE SERPL-MCNC: 97 MG/DL (ref 65–99)
HDLC SERPL-MCNC: 36 MG/DL (ref 40–59)
LDLC SERPL CALC-MCNC: 37 MG/DL (ref 50–99)
POTASSIUM SERPL-SCNC: 4.4 MMOL/L (ref 3.5–5.5)
PROT SERPL-MCNC: 7.7 G/DL (ref 6.4–8.3)
SODIUM SERPL-SCNC: 139 MMOL/L (ref 133–145)
TRIGL SERPL-MCNC: 165 MG/DL (ref 40–149)
VLDLC SERPL CALC-MCNC: 33 MG/DL (ref 8–30)

## 2017-09-12 ENCOUNTER — TELEPHONE (OUTPATIENT)
Dept: FAMILY MEDICINE CLINIC | Age: 52
End: 2017-09-12

## 2017-09-13 NOTE — TELEPHONE ENCOUNTER
Patient returned call and was notified of results.  Patient has been scheduled for a follow up with Dr. Toro Erickson 09/25

## 2017-09-13 NOTE — TELEPHONE ENCOUNTER
Patient has been called with no answer. Unable to leave message at this time. Patients cholesterol levels are elevated as well as minor elevations in his CMP. Patient will need to make an appointment to follow up when Dr. Hardy Rodriguez returns to discuss the elevations and possible treatment. Please schedule.

## 2017-09-25 ENCOUNTER — OFFICE VISIT (OUTPATIENT)
Dept: FAMILY MEDICINE CLINIC | Age: 52
End: 2017-09-25

## 2017-09-25 VITALS
BODY MASS INDEX: 42.53 KG/M2 | OXYGEN SATURATION: 98 % | SYSTOLIC BLOOD PRESSURE: 130 MMHG | WEIGHT: 271 LBS | HEART RATE: 75 BPM | RESPIRATION RATE: 16 BRPM | HEIGHT: 67 IN | DIASTOLIC BLOOD PRESSURE: 70 MMHG

## 2017-09-25 DIAGNOSIS — E78.2 HYPERLIPEMIA, MIXED: ICD-10-CM

## 2017-09-25 DIAGNOSIS — R73.03 PRE-DIABETES: ICD-10-CM

## 2017-09-25 DIAGNOSIS — I10 ESSENTIAL HYPERTENSION: ICD-10-CM

## 2017-09-25 DIAGNOSIS — I50.32 CHRONIC DIASTOLIC CONGESTIVE HEART FAILURE (HCC): Primary | ICD-10-CM

## 2017-09-25 DIAGNOSIS — N18.30 CKD (CHRONIC KIDNEY DISEASE) STAGE 3, GFR 30-59 ML/MIN (HCC): ICD-10-CM

## 2017-09-25 RX ORDER — ROSUVASTATIN CALCIUM 10 MG/1
10 TABLET, COATED ORAL
Qty: 30 TAB | Refills: 6 | Status: SHIPPED | OUTPATIENT
Start: 2017-09-25 | End: 2018-07-05 | Stop reason: SDUPTHER

## 2017-09-25 NOTE — PROGRESS NOTES
HISTORY OF PRESENT ILLNESS  Kianna Norris is a 45 y.o. male here for follow-up of hypertension chronic kidney disease CHF. She would like to discuss lab values which are abnormal.. Patient states he is now exercising and using the treadmill at least twice a week and going to the gym. Chronic Kidney Disease   The history is provided by the medical records. This is a chronic problem. The problem has been gradually improving. Pertinent negatives include no chest pain, no abdominal pain, no headaches and no shortness of breath. Nothing aggravates the symptoms. Nothing relieves the symptoms. He has tried nothing for the symptoms. Hypertension    The history is provided by the patient and medical records. This is a chronic problem. The problem has been gradually improving. Pertinent negatives include no chest pain, no orthopnea, no headaches, no peripheral edema, no dizziness and no shortness of breath. There are no associated agents to hypertension. Risk factors include dyslipidemia, obesity and male gender. Cholesterol Problem   The history is provided by the patient and medical records. This is a chronic problem. The problem has been gradually improving. Pertinent negatives include no chest pain, no abdominal pain, no headaches and no shortness of breath. The symptoms are aggravated by eating. The symptoms are relieved by medications. Treatments tried: Lipitor. The treatment provided significant relief. CHF   The history is provided by the patient and medical records. This is a chronic problem. The problem has been gradually improving. Pertinent negatives include no chest pain, no abdominal pain, no headaches and no shortness of breath. The symptoms are aggravated by eating. The symptoms are relieved by medications. Treatments tried: Spironolactone Lasix carvedilol hydralazine. The treatment provided significant relief. Blood sugar problem   The history is provided by the patient and medical records. This is a chronic problem. The problem has been gradually improving. Pertinent negatives include no chest pain, no abdominal pain, no headaches and no shortness of breath. The symptoms are aggravated by eating. Nothing relieves the symptoms. Allergies   Allergen Reactions    Pcn [Penicillins] Anaphylaxis and Angioedema    Coconut Swelling     Current Outpatient Prescriptions on File Prior to Visit   Medication Sig Dispense Refill    furosemide (LASIX) 20 mg tablet Take 1 Tab by mouth two (2) times a day. 180 Tab 1    atorvastatin (LIPITOR) 20 mg tablet Take 1 Tab by mouth daily. 30 Tab 0    carvedilol (COREG) 25 mg tablet TAKE ONE TABLET BY MOUTH TWICE DAILY WITH  MEALS 60 Tab 3    losartan (COZAAR) 100 mg tablet Take 1 Tab by mouth daily. 30 Tab 6    NIFEdipine ER (PROCARDIA XL) 60 mg ER tablet TAKE ONE TABLET BY MOUTH ONCE DAILY 30 Tab 6    hydrALAZINE (APRESOLINE) 25 mg tablet TAKE ONE TABLET BY MOUTH THREE TIMES DAILY 90 Tab 3    spironolactone (ALDACTONE) 25 mg tablet Take one half tab daily 30 Tab 6    potassium chloride (KLOR-CON M10) 10 mEq tablet TAKE TWO TABLETS BY MOUTH ONCE DAILY 60 Tab 3    febuxostat (ULORIC) 40 mg tab tablet Take 2 Tabs by mouth daily. 90 Tab 3    diclofenac (VOLTAREN) 1 % gel Apply 4 g to affected area four (4) times daily. 100 g 0    MISCELLANEOUS MEDICAL SUPPLY (COMPRESSION STOCKINGS) by Does Not Apply route. Wear while awake. Remove at HS.  LORazepam (ATIVAN) 1 mg tablet Take 1 Tab by mouth every eight (8) hours as needed. Max Daily Amount: 3 mg. Indications: ANXIETY, INSOMNIA 90 Tab 0    ARIPiprazole (ABILIFY) 5 mg tablet Take 5 mg by mouth daily.  aspirin delayed-release 81 mg tablet Take 81 mg by mouth daily. No current facility-administered medications on file prior to visit.       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     Cardiomyopathy ([facility]     CHF (congestive heart failure) (HCC)     CVA (cerebral infarction)     Depression     ED (erectile dysfunction)     Gastritis     Gout     HTN (hypertension)     Hyperlipemia, mixed     Hypoxia     Kidney disease, chronic, stage III (moderate, EGFR 30-59 ml/min)     Pre-diabetes     Sleep apnea     Vitamin D deficiency      Past Surgical History:   Procedure Laterality Date    HX ACL RECONSTRUCTION      HX APPENDECTOMY  2004    became septic from a rupture     Family History   Problem Relation Age of Onset    No Known Problems Mother     Hypertension Father     Diabetes Father     Cancer Father      prostate    No Known Problems Sister     Hypertension Maternal Grandmother     No Known Problems Maternal Grandfather     Alcohol abuse Paternal Grandmother     Bipolar Disorder Paternal Grandmother     No Known Problems Paternal Grandfather      Social History     Social History    Marital status:      Spouse name: N/A    Number of children: N/A    Years of education: N/A     Occupational History    Not on file. Social History Main Topics    Smoking status: Never Smoker    Smokeless tobacco: Never Used    Alcohol use No      Comment: holidays/ occasions    Drug use: No    Sexual activity: Yes     Partners: Female     Other Topics Concern     Service Yes     [employer]    Blood Transfusions No    Caffeine Concern No    Occupational Exposure No    Hobby Hazards No    Sleep Concern No     sleep apnea, has cpap    Stress Concern Yes     family related    Weight Concern No     Pt has lost 20lbs but is wanting to lose more    Special Diet Yes     no salt    Back Care No    Exercise No    Bike Helmet No    Seat Belt Yes    Self-Exams Yes     Social History Narrative         Review of Systems   Constitutional: Negative. Eyes: Negative. Respiratory: Negative. Negative for shortness of breath. Cardiovascular: Negative. Negative for chest pain and orthopnea.    Gastrointestinal: Negative for abdominal pain.   Musculoskeletal: Negative. Neurological: Negative. Negative for dizziness and headaches. Endo/Heme/Allergies: Negative. Psychiatric/Behavioral: Negative. Visit Vitals    /70 (BP 1 Location: Left arm, BP Patient Position: Sitting)    Pulse 75    Resp 16    Ht 5' 7\" (1.702 m)    Wt 271 lb (122.9 kg)    SpO2 98%    BMI 42.44 kg/m2       Physical Exam   Constitutional: He is oriented to person, place, and time. He appears well-developed and well-nourished. HENT:   Head: Normocephalic and atraumatic. Cardiovascular: Normal rate, regular rhythm, normal heart sounds and intact distal pulses. Exam reveals no gallop and no friction rub. No murmur heard. Pulmonary/Chest: Effort normal and breath sounds normal. No respiratory distress. He has no wheezes. He has no rales. Musculoskeletal: Normal range of motion. He exhibits no edema or tenderness. Neurological: He is alert and oriented to person, place, and time. No cranial nerve deficit. Coordination normal.   Skin: Skin is warm and dry. No rash noted. No erythema. No pallor. Psychiatric: He has a normal mood and affect. His behavior is normal. Thought content normal.   Nursing note and vitals reviewed. ASSESSMENT and PLAN    ICD-10-CM ICD-9-CM    1. Chronic diastolic congestive heart failure (HCC) I50.32 428.32      428.0    2. Pre-diabetes R73.03 790.29 HEMOGLOBIN A1C WITH EAG      CBC WITH AUTOMATED DIFF      MICROALBUMIN, UR, RAND W/ MICROALBUMIN/CREA RATIO      HEMOGLOBIN A1C WITH EAG      CBC WITH AUTOMATED DIFF      MICROALBUMIN, UR, RAND W/ MICROALBUMIN/CREA RATIO   3. Essential hypertension S58 986.2 METABOLIC PANEL, COMPREHENSIVE      URINALYSIS W/ RFLX MICROSCOPIC      URINALYSIS W/ RFLX MICROSCOPIC      CANCELED: METABOLIC PANEL, COMPREHENSIVE   4.  Hyperlipemia, mixed H49.4 676.5 METABOLIC PANEL, COMPREHENSIVE      LIPID PANEL      LIPID PANEL      CANCELED: METABOLIC PANEL, COMPREHENSIVE   5. CKD (chronic kidney disease) stage 3, GFR 30-59 ml/min N18.3 585. 3      Follow-up Disposition:  Return in about 4 weeks (around 10/23/2017). Patient has been instructed to use Lipitor 20 mg twice a day until next visit. He also has been started on Crestor 10 mg daily. He has been instructed not to use Lipitor or Crestor for 1 week then start Crestor as above.

## 2017-09-25 NOTE — MR AVS SNAPSHOT
Visit Information Date & Time Provider Department Dept. Phone Encounter #  
 9/25/2017  3:30 PM Roberto Campbell MD [facility] [phone] [id number] Follow-up Instructions Return in about 4 weeks (around 10/23/2017). Your Appointments 10/11/2017 10:00 AM  
PHYSICAL with Roberto Campbell MD  
[facility]) Appt Note: CPE & f/u  
 [address] [facility] [address] [facility] [address]  
[phone]  
  
   
 [address] [place] [id number] Upcoming Health Maintenance Date Due Hepatitis C Screening 1965 DTaP/Tdap/Td series (1 - Tdap) 5/1/1984 Pneumococcal 19-64 Highest Risk (2 of 3 - PCV13) 5/13/2013 INFLUENZA AGE 9 TO ADULT 8/1/2017 FOBT Q 1 YEAR AGE 50-75 5/23/2018 Allergies as of 9/25/2017  Review Complete On: 9/25/2017 By: Roberto Campbell MD  
  
 Severity Noted Reaction Type Reactions Pcn [Penicillins] High 04/01/2015   Side Effect Anaphylaxis, Angioedema Coconut Low 04/01/2015   Side Effect Swelling Current Immunizations  Never Reviewed Name Date Influenza Vaccine 10/17/2015, 10/17/2015 12:00 AM, 11/21/2014 12:00 AM  
 Pneumococcal Polysaccharide (PPSV-23) 5/13/2012 12:00 AM  
  
 Not reviewed this visit You Were Diagnosed With   
  
 Codes Comments Chronic diastolic congestive heart failure (HCC)    -  Primary ICD-10-CM: I50.32 
ICD-9-CM: 428.32, 428.0 Pre-diabetes     ICD-10-CM: R73.03 
ICD-9-CM: 790.29 Essential hypertension     ICD-10-CM: I10 
ICD-9-CM: 401.9 Hyperlipemia, mixed     ICD-10-CM: E78.2 ICD-9-CM: 272.2 Vitals BP Pulse Resp Height(growth percentile) Weight(growth percentile) SpO2  
 130/70 (BP 1 Location: Left arm, BP Patient Position: Sitting) 75 16 5' 7\" (1.702 m) 271 lb (122.9 kg) 98% BMI Smoking Status 42.44 kg/m2 Never Smoker Vitals History BMI and BSA Data Body Mass Index Body Surface Area  
 42.44 kg/m 2 2.41 m 2 Preferred Pharmacy Pharmacy Name Phone [facility] [address] [facility] Fl[address] [phone] Your Updated Medication List  
  
   
This list is accurate as of: 9/25/17  4:19 PM.  Always use your most recent med list.  
  
  
  
  
 ABILIFY 5 mg tablet Generic drug:  ARIPiprazole Take 5 mg by mouth daily. aspirin delayed-release 81 mg tablet Take 81 mg by mouth daily. atorvastatin 20 mg tablet Commonly known as:  LIPITOR Take 1 Tab by mouth daily. carvedilol 25 mg tablet Commonly known as:  COREG  
TAKE ONE TABLET BY MOUTH TWICE DAILY WITH  MEALS  
  
 COMPRESSION STOCKINGS  
by Does Not Apply route. Wear while awake. Remove at HS. diclofenac 1 % Gel Commonly known as:  VOLTAREN Apply 4 g to affected area four (4) times daily. febuxostat 40 mg Tab tablet Commonly known as:  Balinda Ariana Take 2 Tabs by mouth daily. furosemide 20 mg tablet Commonly known as:  LASIX Take 1 Tab by mouth two (2) times a day. hydrALAZINE 25 mg tablet Commonly known as:  APRESOLINE  
TAKE ONE TABLET BY MOUTH THREE TIMES DAILY LORazepam 1 mg tablet Commonly known as:  ATIVAN Take 1 Tab by mouth every eight (8) hours as needed. Max Daily Amount: 3 mg. Indications: ANXIETY, INSOMNIA  
  
 losartan 100 mg tablet Commonly known as:  COZAAR Take 1 Tab by mouth daily. NIFEdipine ER 60 mg ER tablet Commonly known as:  PROCARDIA XL  
TAKE ONE TABLET BY MOUTH ONCE DAILY potassium chloride 10 mEq tablet Commonly known as:  KLOR-CON M10  
TAKE TWO TABLETS BY MOUTH ONCE DAILY  
  
 rosuvastatin 10 mg tablet Commonly known as:  CRESTOR Take 1 Tab by mouth nightly. spironolactone 25 mg tablet Commonly known as:  ALDACTONE Take one half tab daily Prescriptions Sent to Pharmacy Refills  
 rosuvastatin (CRESTOR) 10 mg tablet 6 Sig: Take 1 Tab by mouth nightly. Class: Normal  
 Pharmacy: [address] Ph #: [phone] Route: Oral  
  
We Performed the Following CBC WITH AUTOMATED DIFF [04780 CPT(R)] HEMOGLOBIN A1C WITH EAG [43155 CPT(R)] LIPID PANEL [33962 CPT(R)] METABOLIC PANEL, COMPREHENSIVE [21228 CPT(R)] MICROALBUMIN, UR, RAND W/ MICROALBUMIN/CREA RATIO [id number] CPT(R)] URINALYSIS W/ RFLX MICROSCOPIC [20940 CPT(R)] Follow-up Instructions Return in about 4 weeks (around 10/23/2017). To-Do List   
 09/25/2017 Lab:  CBC WITH AUTOMATED DIFF   
  
 09/25/2017 Lab:  HEMOGLOBIN A1C WITH EAG   
  
 09/25/2017 Lab:  LIPID PANEL   
  
 09/25/2017 Lab:  METABOLIC PANEL, COMPREHENSIVE   
  
 09/25/2017 Lab:  MICROALBUMIN, UR, RAND W/ MICROALBUMIN/CREA RATIO   
  
 09/25/2017 Lab:  URINALYSIS W/ RFLX MICROSCOPIC Introducing [facility] SERVICES! Remigio Crane introduces Edgar patient portal. Now you can access parts of your medical record, email your doctor's office, and request medication refills online. 1. In your internet browser, go to [email or web address] 2. Click on the First Time User? Click Here link in the Sign In box. You will see the New Member Sign Up page. 3. Enter your Edgar Access Code exactly as it appears below. You will not need to use this code after youve completed the sign-up process. If you do not sign up before the expiration date, you must request a new code. · Edgar Access Code: [email or web address] Expires: 12/4/2017  5:45 PM 
 
4. Enter the last four digits of your Social Security Number (xxxx) and Date of Birth (mm/dd/yyyy) as indicated and click Submit. You will be taken to the next sign-up page. 5. Create a Edgar ID.  This will be your Edgar login ID and cannot be changed, so think of one that is secure and easy to remember. 6. Create a Vastrm password. You can change your password at any time. 7. Enter your Password Reset Question and Answer. This can be used at a later time if you forget your password. 8. Enter your e-mail address. You will receive e-mail notification when new information is available in [address] 9. Click Sign Up. You can now view and download portions of your medical record. 10. Click the Download Summary menu link to download a portable copy of your medical information. If you have questions, please visit the Frequently Asked Questions section of the Vastrm website. Remember, Vastrm is NOT to be used for urgent needs. For medical emergencies, dial 911. Now available from your iPhone and Android! Please provide this summary of care documentation to your next provider. Your primary care clinician is listed as Ludy Hill. If you have any questions after today's visit, please call [phone].

## 2017-09-25 NOTE — PROGRESS NOTES
Melina Decker is a 45 y.o. male presents to office for follow up on elevated lab results. Patient stated that he has joined a gym and went grocery shopping to get \"healthir food\" for the house. 1. Have you been to the ER, urgent care clinic or hospitalized since your last visit?  No           Health Maintenance items with a due date reviewed with patient:  Health Maintenance Due   Topic Date Due    Hepatitis C Screening  1965    DTaP/Tdap/Td series (1 - Tdap) 05/01/1984    Pneumococcal 19-64 Highest Risk (2 of 3 - PCV13) 05/13/2013    INFLUENZA AGE 9 TO ADULT  08/01/2017

## 2017-09-26 LAB
ABSOLUTE LYMPHOCYTE COUNT, 10803: 2.2 K/UL (ref 1–4.8)
AVG GLU, 10930: 115 MG/DL (ref 91–123)
BASOPHILS # BLD: 0.1 K/UL (ref 0–0.2)
BASOPHILS NFR BLD: 1 % (ref 0–2)
BILIRUB UR QL: NEGATIVE
CHOLEST SERPL-MCNC: 101 MG/DL (ref 110–200)
CREATININE, URINE: 107 MG/DL
EOSINOPHIL # BLD: 0.4 K/UL (ref 0–0.5)
EOSINOPHIL NFR BLD: 4 % (ref 0–6)
ERYTHROCYTE [DISTWIDTH] IN BLOOD BY AUTOMATED COUNT: 14 % (ref 10–16)
GLUCOSE UR QL: NEGATIVE MG/DL
GRANULOCYTES,GRANS: 62 % (ref 40–75)
HBA1C MFR BLD HPLC: 5.6 % (ref 4.8–5.9)
HCT VFR BLD AUTO: 41.1 % (ref 39.3–51.6)
HDLC SERPL-MCNC: 36 MG/DL (ref 40–59)
HGB BLD-MCNC: 13.4 G/DL (ref 13.1–17.2)
HGB UR QL STRIP: NEGATIVE
KETONES UR QL STRIP.AUTO: NEGATIVE MG/DL
LDLC SERPL CALC-MCNC: 10 MG/DL (ref 50–99)
LEUKOCYTE ESTERASE: NEGATIVE
LYMPHOCYTES, LYMLT: 24 % (ref 27–45)
MCH RBC QN AUTO: 32 PG (ref 26–34)
MCHC RBC AUTO-ENTMCNC: 33 G/DL (ref 32–36)
MCV RBC AUTO: 98 FL (ref 80–95)
MICROALB/CREAT RATIO, 140286: 51.2 MCG/MG OF CREATININE (ref 0–30)
MICROALBUMIN,URINE RANDOM 140054: 54.8 UG/ML (ref 0.1–17)
MONOCYTES # BLD: 0.8 K/UL (ref 0.1–0.9)
MONOCYTES NFR BLD: 9 % (ref 3–9)
NEUTROPHILS # BLD AUTO: 5.7 K/UL (ref 1.8–7.7)
NITRITE UR QL STRIP.AUTO: NEGATIVE
PH UR STRIP: 6 PH (ref 5–8)
PLATELET # BLD AUTO: 266 K/UL (ref 140–440)
PMV BLD AUTO: 10.4 FL (ref 6–10.8)
PROT UR QL STRIP: NEGATIVE MG/DL
RBC # BLD AUTO: 4.21 M/UL (ref 3.8–5.8)
SP GR UR: 1.01 (ref 1–1.03)
TRIGL SERPL-MCNC: 274 MG/DL (ref 40–149)
UROBILINOGEN UR STRIP-MCNC: <2 MG/DL
VLDLC SERPL CALC-MCNC: 55 MG/DL (ref 8–30)
WBC # BLD AUTO: 9.2 K/UL (ref 4–11)

## 2017-10-11 ENCOUNTER — OFFICE VISIT (OUTPATIENT)
Dept: FAMILY MEDICINE CLINIC | Age: 52
End: 2017-10-11

## 2017-10-11 VITALS
WEIGHT: 278 LBS | SYSTOLIC BLOOD PRESSURE: 122 MMHG | OXYGEN SATURATION: 98 % | HEIGHT: 67 IN | TEMPERATURE: 98.4 F | RESPIRATION RATE: 16 BRPM | DIASTOLIC BLOOD PRESSURE: 74 MMHG | BODY MASS INDEX: 43.63 KG/M2 | HEART RATE: 61 BPM

## 2017-10-11 DIAGNOSIS — N18.30 CKD (CHRONIC KIDNEY DISEASE) STAGE 3, GFR 30-59 ML/MIN (HCC): ICD-10-CM

## 2017-10-11 DIAGNOSIS — I50.32 CHRONIC DIASTOLIC CONGESTIVE HEART FAILURE (HCC): ICD-10-CM

## 2017-10-11 DIAGNOSIS — I10 ESSENTIAL HYPERTENSION: ICD-10-CM

## 2017-10-11 DIAGNOSIS — Z23 ENCOUNTER FOR IMMUNIZATION: Primary | ICD-10-CM

## 2017-10-11 DIAGNOSIS — E78.2 HYPERLIPEMIA, MIXED: ICD-10-CM

## 2017-10-11 NOTE — PROGRESS NOTES
Dani Bass is a 45 y.o. male presents in office to be seen for htn and lipid problem. Health Maintenance Due   Topic Date Due    Hepatitis C Screening  1965    DTaP/Tdap/Td series (1 - Tdap) 05/01/1984    Pneumococcal 19-64 Highest Risk (2 of 3 - PCV13) 05/13/2013    INFLUENZA AGE 9 TO ADULT  08/01/2017       1. Have you been to the ER, urgent care clinic since your last visit? Hospitalized since your last visit?no    2. Have you seen or consulted any other health care providers outside of the [address] [place] [address] since your last visit? Include any pap smears or colon screening. no    Dani Bass is a 45 y.o. male who presents for routine immunizations. He denies any symptoms , reactions or allergies that would exclude them from being immunized today. Risks and adverse reactions were discussed and the VIS was given to them. All questions were addressed. He was observed for 5 min post injection. There were no reactions observed.     Leo Jordan LPN

## 2017-10-11 NOTE — MR AVS SNAPSHOT
Visit Information Date & Time Provider Department Dept. Phone Encounter #  
 10/11/2017 11:00 AM Ermelinda Pollack MD [facility] [phone] [id number] Follow-up Instructions Return in about 6 weeks (around 11/22/2017) for CPE and F/U. Your Appointments 11/22/2017 10:00 AM  
PHYSICAL with Ermelinda Pollack MD  
[facility] [address]) Appt Note: Return in about 6 weeks (around 11/22/2017) for CPE and F/U.  
 [address] [facility] [address] [facility] [address]  
[phone]  
  
   
 [address] [place] [address] Upcoming Health Maintenance Date Due Hepatitis C Screening 1965 DTaP/Tdap/Td series (1 - Tdap) 5/1/1984 Pneumococcal 19-64 Highest Risk (2 of 3 - PCV13) 5/13/2013 INFLUENZA AGE 9 TO ADULT 8/1/2017 FOBT Q 1 YEAR AGE 50-75 5/23/2018 Allergies as of 10/11/2017  Review Complete On: 10/11/2017 By: Ermelinda Pollack MD  
  
 Severity Noted Reaction Type Reactions Pcn [Penicillins] High 04/01/2015   Side Effect Anaphylaxis, Angioedema Coconut Low 04/01/2015   Side Effect Swelling Current Immunizations  Reviewed on 10/11/2017 Name Date Influenza Vaccine 10/17/2015, 10/17/2015 12:00 AM, 11/21/2014 12:00 AM  
 Influenza Vaccine (Quad) PF 10/11/2017 Pneumococcal Polysaccharide (PPSV-23) 5/13/2012 12:00 AM  
  
 Reviewed by Leonid Moran LPN on 50/03/1199 at 11:52 AM  
You Were Diagnosed With   
  
 Codes Comments Encounter for immunization    -  Primary ICD-10-CM: K25 ICD-9-CM: V03.89 Essential hypertension     ICD-10-CM: I10 
ICD-9-CM: 401.9 Hyperlipemia, mixed     ICD-10-CM: E78.2 ICD-9-CM: 272.2 CKD (chronic kidney disease) stage 3, GFR 30-59 ml/min     ICD-10-CM: N18.3 ICD-9-CM: 070. 3 Chronic diastolic congestive heart failure (HCC)     ICD-10-CM: I50.32 
ICD-9-CM: 428.32, 428.0 Vitals BP Pulse Temp Resp Height(growth percentile) Weight(growth percentile) 122/74 61 98.4 °F (36.9 °C) (Oral) 16 5' 7.01\" (1.702 m) 278 lb (126.1 kg) SpO2 BMI Smoking Status 98% 43.53 kg/m2 Never Smoker Vitals History BMI and BSA Data Body Mass Index Body Surface Area  
 43.53 kg/m 2 2.44 m 2 Preferred Pharmacy Pharmacy Name Phone [address] [facility] [address] [facility] [phone] Your Updated Medication List  
  
   
This list is accurate as of: 10/11/17 11:58 AM.  Always use your most recent med list.  
  
  
  
  
 ABILIFY 5 mg tablet Generic drug:  ARIPiprazole Take 5 mg by mouth daily. aspirin delayed-release 81 mg tablet Take 81 mg by mouth daily. atorvastatin 20 mg tablet Commonly known as:  LIPITOR Take 1 Tab by mouth daily. carvedilol 25 mg tablet Commonly known as:  COREG  
TAKE ONE TABLET BY MOUTH TWICE DAILY WITH  MEALS  
  
 COMPRESSION STOCKINGS  
by Does Not Apply route. Wear while awake. Remove at HS.  
  
 furosemide 20 mg tablet Commonly known as:  LASIX Take 1 Tab by mouth two (2) times a day. hydrALAZINE 25 mg tablet Commonly known as:  APRESOLINE  
TAKE ONE TABLET BY MOUTH THREE TIMES DAILY LORazepam 1 mg tablet Commonly known as:  ATIVAN Take 1 Tab by mouth every eight (8) hours as needed. Max Daily Amount: 3 mg. Indications: ANXIETY, INSOMNIA  
  
 losartan 100 mg tablet Commonly known as:  COZAAR Take 1 Tab by mouth daily. NIFEdipine ER 60 mg ER tablet Commonly known as:  PROCARDIA XL  
TAKE ONE TABLET BY MOUTH ONCE DAILY potassium chloride 10 mEq tablet Commonly known as:  KLOR-CON M10  
TAKE TWO TABLETS BY MOUTH ONCE DAILY  
  
 rosuvastatin 10 mg tablet Commonly known as:  CRESTOR Take 1 Tab by mouth nightly. spironolactone 25 mg tablet Commonly known as:  ALDACTONE Take one half tab daily We Performed the Following INFLUENZA VIRUS VAC QUAD,SPLIT,PRESV FREE SYRINGE IM [id number] CPT(R)] [place] IMMUNIZ ADMIN,1 SINGLE/COMB VAC/TOXOID [id number] CPT(R)] Follow-up Instructions Return in about 6 weeks (around 11/22/2017) for CPE and F/U. Introducing hospitals & HEALTH SERVICES! [facility] introduces [employer] patient portal. Now you can access parts of your medical record, email your doctor's office, and request medication refills online. 1. In your internet browser, go to [email or web address] 2. Click on the First Time User? Click Here link in the Sign In box. You will see the New Member Sign Up page. 3. Enter your [employer] Access Code exactly as it appears below. You will not need to use this code after youve completed the sign-up process. If you do not sign up before the expiration date, you must request a new code. · [employer] Access Code: [email or web address] Expires: 12/4/2017  5:45 PM 
 
4. Enter the last four digits of your Social Security Number (xxxx) and Date of Birth (mm/dd/yyyy) as indicated and click Submit. You will be taken to the next sign-up page. 5. Create a [employer] ID. This will be your [employer] login ID and cannot be changed, so think of one that is secure and easy to remember. 6. Create a [employer] password. You can change your password at any time. 7. Enter your Password Reset Question and Answer. This can be used at a later time if you forget your password. 8. Enter your e-mail address. You will receive e-mail notification when new information is available in [address] 9. Click Sign Up. You can now view and download portions of your medical record. 10. Click the Download Summary menu link to download a portable copy of your medical information. If you have questions, please visit the Frequently Asked Questions section of the [employer] website. Remember, [employer] is NOT to be used for urgent needs. For medical emergencies, dial 911. Now available from your iPhone and Android! Please provide this summary of care documentation to your next provider. Your primary care clinician is listed as Abdirashid Simpson. If you have any questions after today's visit, please call [phone].

## 2017-10-11 NOTE — PROGRESS NOTES
HISTORY OF PRESENT ILLNESS  Maria C Dumont is a 45 y.o. male here for follow-up of prediabetes hypertension hyperlipidemia and CHF. Patient is unsure whether he is taking Crestor or Lipitor his LDL cholesterol is 10. His A1c is 5-1/2. Hypertension    The history is provided by the patient and medical records. This is a chronic problem. The problem has been gradually improving. Pertinent negatives include no chest pain, no orthopnea, no headaches, no peripheral edema, no dizziness and no shortness of breath. There are no associated agents to hypertension. Risk factors include dyslipidemia, obesity and male gender. Cholesterol Problem   The history is provided by the patient and medical records. This is a chronic problem. The problem has been gradually improving. Pertinent negatives include no chest pain, no headaches and no shortness of breath. The symptoms are aggravated by eating. The symptoms are relieved by medications. Blood sugar problem   The history is provided by the patient and medical records. This is a chronic problem. The problem has been gradually improving. Pertinent negatives include no chest pain, no headaches and no shortness of breath. Nothing aggravates the symptoms. He has tried nothing for the symptoms. CHF   The history is provided by the patient and medical records. This is a chronic problem. The problem has been gradually improving. Pertinent negatives include no chest pain, no headaches and no shortness of breath. Nothing aggravates the symptoms. The symptoms are relieved by medications. Treatments tried: Lasix Spironolactone and losartan. The treatment provided significant relief. Chronic Kidney Disease   The history is provided by the patient and medical records. This is a chronic problem. The problem has been gradually improving. Pertinent negatives include no chest pain, no headaches and no shortness of breath. Nothing aggravates the symptoms.  Nothing relieves the symptoms. He has tried nothing for the symptoms. Allergies   Allergen Reactions    Pcn [Penicillins] Anaphylaxis and Angioedema    Coconut Swelling     Current Outpatient Prescriptions on File Prior to Visit   Medication Sig Dispense Refill    rosuvastatin (CRESTOR) 10 mg tablet Take 1 Tab by mouth nightly. 30 Tab 6    furosemide (LASIX) 20 mg tablet Take 1 Tab by mouth two (2) times a day. 180 Tab 1    atorvastatin (LIPITOR) 20 mg tablet Take 1 Tab by mouth daily. 30 Tab 0    carvedilol (COREG) 25 mg tablet TAKE ONE TABLET BY MOUTH TWICE DAILY WITH  MEALS 60 Tab 3    losartan (COZAAR) 100 mg tablet Take 1 Tab by mouth daily. 30 Tab 6    NIFEdipine ER (PROCARDIA XL) 60 mg ER tablet TAKE ONE TABLET BY MOUTH ONCE DAILY 30 Tab 6    hydrALAZINE (APRESOLINE) 25 mg tablet TAKE ONE TABLET BY MOUTH THREE TIMES DAILY 90 Tab 3    spironolactone (ALDACTONE) 25 mg tablet Take one half tab daily 30 Tab 6    potassium chloride (KLOR-CON M10) 10 mEq tablet TAKE TWO TABLETS BY MOUTH ONCE DAILY 60 Tab 3    MISCELLANEOUS MEDICAL SUPPLY (COMPRESSION STOCKINGS) by Does Not Apply route. Wear while awake. Remove at HS.  LORazepam (ATIVAN) 1 mg tablet Take 1 Tab by mouth every eight (8) hours as needed. Max Daily Amount: 3 mg. Indications: ANXIETY, INSOMNIA 90 Tab 0    ARIPiprazole (ABILIFY) 5 mg tablet Take 5 mg by mouth daily.  aspirin delayed-release 81 mg tablet Take 81 mg by mouth daily. No current facility-administered medications on file prior to visit.       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     Cardiomyopathy ([facility]     CHF (congestive heart failure) (HCC)     CVA (cerebral infarction)     Depression     ED (erectile dysfunction)     Gastritis     Gout     HTN (hypertension)     Hyperlipemia, mixed     Hypoxia     Kidney disease, chronic, stage III (moderate, EGFR 30-59 ml/min)     Pre-diabetes     Sleep apnea     Vitamin D deficiency Past Surgical History:   Procedure Laterality Date    HX ACL RECONSTRUCTION      HX APPENDECTOMY  2004    became septic from a rupture     Family History   Problem Relation Age of Onset    No Known Problems Mother     Hypertension Father     Diabetes Father     Cancer Father      prostate    No Known Problems Sister     Hypertension Maternal Grandmother     No Known Problems Maternal Grandfather     Alcohol abuse Paternal Grandmother     Bipolar Disorder Paternal Grandmother     No Known Problems Paternal Grandfather      Social History     Social History    Marital status:      Spouse name: N/A    Number of children: N/A    Years of education: N/A     Occupational History    Not on file. Social History Main Topics    Smoking status: Never Smoker    Smokeless tobacco: Never Used    Alcohol use No      Comment: holidays/ occasions    Drug use: No    Sexual activity: Yes     Partners: Female     Other Topics Concern     Service Yes     [employer]    Blood Transfusions No    Caffeine Concern No    Occupational Exposure No    Hobby Hazards No    Sleep Concern No     sleep apnea, has cpap    Stress Concern Yes     family related    Weight Concern No     Pt has lost 20lbs but is wanting to lose more    Special Diet Yes     no salt    Back Care No    Exercise No    Bike Helmet No    Seat Belt Yes    Self-Exams Yes     Social History Narrative         Review of Systems   Constitutional: Negative. Eyes: Negative. Respiratory: Negative. Negative for shortness of breath. Cardiovascular: Negative. Negative for chest pain and orthopnea. Musculoskeletal: Negative. Neurological: Negative. Negative for dizziness and headaches. Endo/Heme/Allergies: Negative. Psychiatric/Behavioral: Negative.       Visit Vitals    /74    Pulse 61    Temp 98.4 °F (36.9 °C) (Oral)    Resp 16    Ht 5' 7.01\" (1.702 m)    Wt 278 lb (126.1 kg)    SpO2 98%    BMI 43.53 kg/m2       Physical Exam   Constitutional: He is oriented to person, place, and time. He appears well-developed and well-nourished. HENT:   Head: Normocephalic and atraumatic. Cardiovascular: Normal rate, regular rhythm, normal heart sounds and intact distal pulses. Exam reveals no gallop and no friction rub. No murmur heard. Pulmonary/Chest: Effort normal and breath sounds normal. No respiratory distress. He has no wheezes. He has no rales. Musculoskeletal: Normal range of motion. He exhibits no edema or tenderness. Neurological: He is alert and oriented to person, place, and time. No cranial nerve deficit. Coordination normal.   Skin: Skin is warm and dry. No rash noted. No erythema. No pallor. Psychiatric: He has a normal mood and affect. His behavior is normal. Thought content normal.   Nursing note and vitals reviewed. ASSESSMENT and PLAN    ICD-10-CM ICD-9-CM    1. Encounter for immunization Z23 V03.89 [place] IMMUNIZ ADMIN,1 SINGLE/COMB VAC/TOXOID      INFLUENZA VIRUS VAC QUAD,SPLIT,PRESV FREE SYRINGE IM   2. Essential hypertension I10 401.9    3. Hyperlipemia, mixed E78.2 272.2    4. CKD (chronic kidney disease) stage 3, GFR 30-59 ml/min N18.3 585.3    5. Chronic diastolic congestive heart failure (HCC) I50.32 428.32      428.0      Follow-up Disposition:  Return in about 6 weeks (around 11/22/2017) for CPE and F/U.

## 2017-10-19 ENCOUNTER — OFFICE VISIT (OUTPATIENT)
Dept: FAMILY MEDICINE CLINIC | Age: 52
End: 2017-10-19

## 2017-10-19 VITALS
WEIGHT: 283 LBS | DIASTOLIC BLOOD PRESSURE: 73 MMHG | TEMPERATURE: 97.8 F | HEART RATE: 63 BPM | BODY MASS INDEX: 44.42 KG/M2 | HEIGHT: 67 IN | OXYGEN SATURATION: 97 % | RESPIRATION RATE: 16 BRPM | SYSTOLIC BLOOD PRESSURE: 135 MMHG

## 2017-10-19 DIAGNOSIS — J11.1 INFLUENZA: Primary | ICD-10-CM

## 2017-10-19 LAB
QUICKVUE INFLUENZA TEST: POSITIVE
VALID INTERNAL CONTROL?: YES

## 2017-10-19 NOTE — PROGRESS NOTES
HISTORY OF PRESENT ILLNESS  Zeina Ewing is a 45 y.o. male here with signs and symptoms of upper respiratory tract infection. Patient complains of generalized myalgia cough nasal congestion and ear fullness. Flu    The history is provided by the patient and medical records. This is a new problem. The problem has not changed since onset. His temperature was unmeasured prior to arrival. Associated symptoms include congestion, sore throat, cough and shortness of breath. He has tried nothing for the symptoms. Allergies   Allergen Reactions    Pcn [Penicillins] Anaphylaxis and Angioedema    Coconut Swelling     Current Outpatient Prescriptions on File Prior to Visit   Medication Sig Dispense Refill    rosuvastatin (CRESTOR) 10 mg tablet Take 1 Tab by mouth nightly. 30 Tab 6    furosemide (LASIX) 20 mg tablet Take 1 Tab by mouth two (2) times a day. 180 Tab 1    atorvastatin (LIPITOR) 20 mg tablet Take 1 Tab by mouth daily. 30 Tab 0    carvedilol (COREG) 25 mg tablet TAKE ONE TABLET BY MOUTH TWICE DAILY WITH  MEALS 60 Tab 3    losartan (COZAAR) 100 mg tablet Take 1 Tab by mouth daily. 30 Tab 6    NIFEdipine ER (PROCARDIA XL) 60 mg ER tablet TAKE ONE TABLET BY MOUTH ONCE DAILY 30 Tab 6    hydrALAZINE (APRESOLINE) 25 mg tablet TAKE ONE TABLET BY MOUTH THREE TIMES DAILY 90 Tab 3    spironolactone (ALDACTONE) 25 mg tablet Take one half tab daily 30 Tab 6    potassium chloride (KLOR-CON M10) 10 mEq tablet TAKE TWO TABLETS BY MOUTH ONCE DAILY 60 Tab 3    MISCELLANEOUS MEDICAL SUPPLY (COMPRESSION STOCKINGS) by Does Not Apply route. Wear while awake. Remove at HS.  LORazepam (ATIVAN) 1 mg tablet Take 1 Tab by mouth every eight (8) hours as needed. Max Daily Amount: 3 mg. Indications: ANXIETY, INSOMNIA 90 Tab 0    aspirin delayed-release 81 mg tablet Take 81 mg by mouth daily.  ARIPiprazole (ABILIFY) 5 mg tablet Take 5 mg by mouth daily.        No current facility-administered medications on file prior to visit. 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     Cardiomyopathy ([facility]     CHF (congestive heart failure) (HCC)     CVA (cerebral infarction)     Depression     ED (erectile dysfunction)     Gastritis     Gout     HTN (hypertension)     Hyperlipemia, mixed     Hypoxia     Kidney disease, chronic, stage III (moderate, EGFR 30-59 ml/min)     Pre-diabetes     Sleep apnea     Vitamin D deficiency      Past Surgical History:   Procedure Laterality Date    HX ACL RECONSTRUCTION      HX APPENDECTOMY  2004    became septic from a rupture     Family History   Problem Relation Age of Onset    No Known Problems Mother     Hypertension Father     Diabetes Father     Cancer Father      prostate    No Known Problems Sister     Hypertension Maternal Grandmother     No Known Problems Maternal Grandfather     Alcohol abuse Paternal Grandmother     Bipolar Disorder Paternal Grandmother     No Known Problems Paternal Grandfather      Social History     Social History    Marital status:      Spouse name: N/A    Number of children: N/A    Years of education: N/A     Occupational History    Not on file. Social History Main Topics    Smoking status: Never Smoker    Smokeless tobacco: Never Used    Alcohol use No      Comment: holidays/ occasions    Drug use: No    Sexual activity: Yes     Partners: Female     Other Topics Concern     Service Yes     [employer]    Blood Transfusions No    Caffeine Concern No    Occupational Exposure No    Hobby Hazards No    Sleep Concern No     sleep apnea, has cpap    Stress Concern Yes     family related    Weight Concern No     Pt has lost 20lbs but is wanting to lose more    Special Diet Yes     no salt    Back Care No    Exercise No    Bike Helmet No    Seat Belt Yes    Self-Exams Yes     Social History Narrative         Review of Systems   Constitutional: Negative.     HENT: Positive for congestion and sore throat. Eyes: Negative. Respiratory: Positive for cough and shortness of breath. Cardiovascular: Negative. Musculoskeletal: Positive for myalgias. Neurological: Negative. Endo/Heme/Allergies: Negative. Psychiatric/Behavioral: Negative. .  Visit Vitals    /73    Pulse 63    Temp 97.8 °F (36.6 °C) (Oral)    Resp 16    Ht 5' 7\" (1.702 m)    Wt 283 lb (128.4 kg)    SpO2 97%    BMI 44.32 kg/m2       Physical Exam   Constitutional: He is oriented to person, place, and time. He appears well-developed and well-nourished. HENT:   Head: Normocephalic and atraumatic. Cardiovascular: Normal rate, regular rhythm, normal heart sounds and intact distal pulses. Exam reveals no gallop and no friction rub. No murmur heard. Pulmonary/Chest: Effort normal and breath sounds normal. No respiratory distress. He has no wheezes. He has no rales. Musculoskeletal: Normal range of motion. He exhibits no edema or tenderness. Neurological: He is alert and oriented to person, place, and time. No cranial nerve deficit. Coordination normal.   Skin: Skin is warm and dry. No rash noted. No erythema. No pallor. Psychiatric: He has a normal mood and affect. His behavior is normal. Thought content normal.   Nursing note and vitals reviewed. ASSESSMENT and PLAN    ICD-10-CM ICD-9-CM    1.  Influenza J11.1 487.1 AMB POC RAPID INFLUENZA TEST     Follow-up Disposition: Not on File

## 2017-10-19 NOTE — PROGRESS NOTES
Jake Vail is a 45 y.o. male presents to office for nasal congestion, cough, ear fullness.       1. Have you been to the ER, urgent care clinic or hospitalized since your last visit? no        Health Maintenance items with a due date reviewed with patient:  Health Maintenance Due   Topic Date Due    Hepatitis C Screening  1965    DTaP/Tdap/Td series (1 - Tdap) 05/01/1984    Pneumococcal 19-64 Highest Risk (2 of 3 - PCV13) 05/13/2013

## 2017-10-19 NOTE — MR AVS SNAPSHOT
Visit Information Date & Time Provider Department Dept. Phone Encounter #  
 10/19/2017  4:15 PM Francine Montiel MD [address] [facility] [phone] [id number] Your Appointments 11/22/2017 10:00 AM  
PHYSICAL with Francine Montiel MD  
[address] [facility]) Appt Note: Return in about 6 weeks (around 11/22/2017) for CPE and F/U.  
 [address] [facility] [address] [place] [address]  
[phone]  
  
   
 [address] [place] [id number] Upcoming Health Maintenance Date Due Hepatitis C Screening 1965 DTaP/Tdap/Td series (1 - Tdap) 5/1/1984 Pneumococcal 19-64 Highest Risk (2 of 3 - PCV13) 5/13/2013 FOBT Q 1 YEAR AGE 50-75 5/23/2018 Allergies as of 10/19/2017  Review Complete On: 10/19/2017 By: Francine Montiel MD  
  
 Severity Noted Reaction Type Reactions Pcn [Penicillins] High 04/01/2015   Side Effect Anaphylaxis, Angioedema Coconut Low 04/01/2015   Side Effect Swelling Current Immunizations  Reviewed on 10/11/2017 Name Date Influenza Vaccine 10/17/2015, 10/17/2015 12:00 AM, 11/21/2014 12:00 AM  
 Influenza Vaccine (Quad) PF 10/11/2017 Pneumococcal Polysaccharide (PPSV-23) 5/13/2012 12:00 AM  
  
 Not reviewed this visit You Were Diagnosed With   
  
 Codes Comments Influenza    -  Primary ICD-10-CM: J11.1 ICD-9-CM: 487. 1 Vitals BP Pulse Temp Resp Height(growth percentile) Weight(growth percentile) 135/73 63 97.8 °F (36.6 °C) (Oral) 16 5' 7\" (1.702 m) 283 lb (128.4 kg) SpO2 BMI Smoking Status 97% 44.32 kg/m2 Never Smoker BMI and BSA Data Body Mass Index Body Surface Area 44.32 kg/m 2 2.46 m 2 Preferred Pharmacy Pharmacy Name Phone [facility] [address] [facility] [phone] Your Updated Medication List  
  
   
 This list is accurate as of: 10/19/17 11:59 PM.  Always use your most recent med list.  
  
  
  
  
 ABILIFY 5 mg tablet Generic drug:  ARIPiprazole Take 5 mg by mouth daily. aspirin delayed-release 81 mg tablet Take 81 mg by mouth daily. atorvastatin 20 mg tablet Commonly known as:  LIPITOR Take 1 Tab by mouth daily. carvedilol 25 mg tablet Commonly known as:  COREG  
TAKE ONE TABLET BY MOUTH TWICE DAILY WITH  MEALS  
  
 COMPRESSION STOCKINGS  
by Does Not Apply route. Wear while awake. Remove at HS.  
  
 furosemide 20 mg tablet Commonly known as:  LASIX Take 1 Tab by mouth two (2) times a day. hydrALAZINE 25 mg tablet Commonly known as:  APRESOLINE  
TAKE ONE TABLET BY MOUTH THREE TIMES DAILY LORazepam 1 mg tablet Commonly known as:  ATIVAN Take 1 Tab by mouth every eight (8) hours as needed. Max Daily Amount: 3 mg. Indications: ANXIETY, INSOMNIA  
  
 losartan 100 mg tablet Commonly known as:  COZAAR Take 1 Tab by mouth daily. NIFEdipine ER 60 mg ER tablet Commonly known as:  PROCARDIA XL  
TAKE ONE TABLET BY MOUTH ONCE DAILY potassium chloride 10 mEq tablet Commonly known as:  KLOR-CON M10  
TAKE TWO TABLETS BY MOUTH ONCE DAILY  
  
 rosuvastatin 10 mg tablet Commonly known as:  CRESTOR Take 1 Tab by mouth nightly. spironolactone 25 mg tablet Commonly known as:  ALDACTONE Take one half tab daily We Performed the Following AMB POC RAPID INFLUENZA TEST [99261 CPT(R)] Introducing [facility] SERVICES! Lenora Patel introduces "[employer]" patient portal. Now you can access parts of your medical record, email your doctor's office, and request medication refills online. 1. In your internet browser, go to [email or web address] 2. Click on the First Time User? Click Here link in the Sign In box. You will see the New Member Sign Up page. 3. Enter your [employer] Access Code exactly as it appears below. You will not need to use this code after youve completed the sign-up process. If you do not sign up before the expiration date, you must request a new code. · [employer] Access Code: [email or web address] Expires: 12/4/2017  5:45 PM 
 
4. Enter the last four digits of your Social Security Number (xxxx) and Date of Birth (mm/dd/yyyy) as indicated and click Submit. You will be taken to the next sign-up page. 5. Create a [employer] ID. This will be your [employer] login ID and cannot be changed, so think of one that is secure and easy to remember. 6. Create a [employer] password. You can change your password at any time. 7. Enter your Password Reset Question and Answer. This can be used at a later time if you forget your password. 8. Enter your e-mail address. You will receive e-mail notification when new information is available in [address] 9. Click Sign Up. You can now view and download portions of your medical record. 10. Click the Download Summary menu link to download a portable copy of your medical information. If you have questions, please visit the Frequently Asked Questions section of the [employer] website. Remember, [employer] is NOT to be used for urgent needs. For medical emergencies, dial 911. Now available from your iPhone and Android! Please provide this summary of care documentation to your next provider. Your primary care clinician is listed as Natalia Antoine. If you have any questions after today's visit, please call [phone].

## 2017-10-26 DIAGNOSIS — I50.32 CHRONIC DIASTOLIC CONGESTIVE HEART FAILURE (HCC): ICD-10-CM

## 2017-10-30 DIAGNOSIS — I10 ESSENTIAL HYPERTENSION WITH GOAL BLOOD PRESSURE LESS THAN 140/90: ICD-10-CM

## 2017-10-30 DIAGNOSIS — N18.30 CKD (CHRONIC KIDNEY DISEASE) STAGE 3, GFR 30-59 ML/MIN (HCC): ICD-10-CM

## 2017-10-30 NOTE — TELEPHONE ENCOUNTER
Dr. Brittny Quiñones, please see refill request for patient, thank you!     Requested Prescriptions     Pending Prescriptions Disp Refills    hydrALAZINE (APRESOLINE) 25 mg tablet [Pharmacy Med Name: HydrALAZINE 25MG    TAB] 90 Tab 3     Sig: TAKE ONE TABLET BY MOUTH THREE TIMES DAILY    NIFEdipine ER (PROCARDIA XL) 60 mg ER tablet 30 Tab 6     Sig: TAKE ONE TABLET BY MOUTH ONCE DAILY

## 2017-10-30 NOTE — TELEPHONE ENCOUNTER
Pt calling to request medication refill of:    Requested Prescriptions     Pending Prescriptions Disp Refills    hydrALAZINE (APRESOLINE) 25 mg tablet [Pharmacy Med Name: HydrALAZINE 25MG    TAB] 90 Tab 3     Sig: TAKE ONE TABLET BY MOUTH THREE TIMES DAILY    NIFEdipine ER (PROCARDIA XL) 60 mg ER tablet 30 Tab 6     Sig: TAKE ONE TABLET BY MOUTH ONCE DAILY          be sent to [address] [place] [address]. Pt has about 5 tabs remaining. Pts last appt was 10/19/17. Advised pt of 72 hour time frame for refill requests. Please advise.

## 2017-11-01 RX ORDER — HYDRALAZINE HYDROCHLORIDE 25 MG/1
TABLET, FILM COATED ORAL
Qty: 90 TAB | Refills: 3 | Status: SHIPPED | OUTPATIENT
Start: 2017-11-01 | End: 2018-08-28 | Stop reason: SDUPTHER

## 2017-11-01 RX ORDER — NIFEDIPINE 60 MG/1
TABLET, EXTENDED RELEASE ORAL
Qty: 30 TAB | Refills: 6 | Status: SHIPPED | OUTPATIENT
Start: 2017-11-01 | End: 2018-06-02 | Stop reason: SDUPTHER

## 2017-11-06 DIAGNOSIS — N18.30 CKD (CHRONIC KIDNEY DISEASE) STAGE 3, GFR 30-59 ML/MIN (HCC): ICD-10-CM

## 2017-11-06 NOTE — TELEPHONE ENCOUNTER
Please resend rx for hydrALAZINE (APRESOLINE) 25 mg tablet to [id number] [place], [place] [address] b/c they are saying they never rec'vd the rx.

## 2017-11-07 RX ORDER — HYDRALAZINE HYDROCHLORIDE 25 MG/1
TABLET, FILM COATED ORAL
Qty: 90 TAB | Refills: 3 | Status: SHIPPED | OUTPATIENT
Start: 2017-11-07 | End: 2017-11-29 | Stop reason: SDUPTHER

## 2017-11-29 ENCOUNTER — OFFICE VISIT (OUTPATIENT)
Dept: FAMILY MEDICINE CLINIC | Age: 52
End: 2017-11-29

## 2017-11-29 VITALS
BODY MASS INDEX: 43.47 KG/M2 | HEIGHT: 67 IN | RESPIRATION RATE: 16 BRPM | WEIGHT: 277 LBS | SYSTOLIC BLOOD PRESSURE: 130 MMHG | TEMPERATURE: 98.7 F | DIASTOLIC BLOOD PRESSURE: 60 MMHG | OXYGEN SATURATION: 98 % | HEART RATE: 64 BPM

## 2017-11-29 DIAGNOSIS — R73.03 PRE-DIABETES: ICD-10-CM

## 2017-11-29 DIAGNOSIS — I10 ESSENTIAL HYPERTENSION: ICD-10-CM

## 2017-11-29 DIAGNOSIS — I50.32 CHRONIC DIASTOLIC CONGESTIVE HEART FAILURE (HCC): ICD-10-CM

## 2017-11-29 DIAGNOSIS — Z00.00 ROUTINE GENERAL MEDICAL EXAMINATION AT A HEALTH CARE FACILITY: Primary | ICD-10-CM

## 2017-11-29 DIAGNOSIS — Z12.11 SCREENING FOR COLON CANCER: ICD-10-CM

## 2017-11-29 DIAGNOSIS — N18.30 CKD (CHRONIC KIDNEY DISEASE) STAGE 3, GFR 30-59 ML/MIN (HCC): ICD-10-CM

## 2017-11-29 DIAGNOSIS — E78.2 HYPERLIPEMIA, MIXED: ICD-10-CM

## 2017-11-29 DIAGNOSIS — Z12.5 SCREENING FOR PROSTATE CANCER: ICD-10-CM

## 2017-11-29 PROBLEM — E66.01 OBESITY, MORBID (HCC): Status: ACTIVE | Noted: 2017-11-29

## 2017-11-29 NOTE — PROGRESS NOTES
HISTORY OF PRESENT ILLNESS  Ezequiel Marie is a 45 y.o. male here for follow-up on hyperlipidemia hypertension chronic kidney disease CHF and morbid obesity. Apparently patient has been taking both Lipitor and Crestor. Patient states that he is feeling well and has no complaints today. Cholesterol Problem   The history is provided by the patient and medical records. This is a chronic problem. The problem has been gradually improving. Pertinent negatives include no chest pain, no headaches and no shortness of breath. The symptoms are aggravated by eating. The symptoms are relieved by medications. Hypertension    The history is provided by the patient and medical records. This is a chronic problem. The problem has been gradually improving. Pertinent negatives include no chest pain, no orthopnea, no headaches, no peripheral edema, no dizziness and no shortness of breath. There are no associated agents to hypertension. Risk factors include dyslipidemia, obesity and male gender. Chronic Kidney Disease   The history is provided by the patient and medical records. This is a chronic problem. The problem has been gradually improving. Pertinent negatives include no chest pain, no headaches and no shortness of breath. Nothing aggravates the symptoms. Nothing relieves the symptoms. He has tried nothing for the symptoms. Morbid Obesity   The history is provided by the medical records. This is a chronic problem. The problem has been gradually improving. Pertinent negatives include no chest pain, no headaches and no shortness of breath. The symptoms are aggravated by eating. Nothing relieves the symptoms. He has tried nothing for the symptoms. CHF   The history is provided by the patient and medical records. This is a chronic problem. The problem has been gradually improving. Pertinent negatives include no chest pain, no headaches and no shortness of breath. Nothing aggravates the symptoms.  The symptoms are relieved by medications. Treatments tried: Lasix Spironolactone and losartan. The treatment provided significant relief. Blood sugar problem   The history is provided by the patient and medical records. This is a chronic problem. The problem has been gradually improving. Pertinent negatives include no chest pain, no headaches and no shortness of breath. Nothing aggravates the symptoms. He has tried nothing for the symptoms. Allergies   Allergen Reactions    Pcn [Penicillins] Anaphylaxis and Angioedema    Coconut Swelling     Current Outpatient Prescriptions on File Prior to Visit   Medication Sig Dispense Refill    hydrALAZINE (APRESOLINE) 25 mg tablet TAKE ONE TABLET BY MOUTH THREE TIMES DAILY 90 Tab 3    NIFEdipine ER (PROCARDIA XL) 60 mg ER tablet TAKE ONE TABLET BY MOUTH ONCE DAILY 30 Tab 6    rosuvastatin (CRESTOR) 10 mg tablet Take 1 Tab by mouth nightly. 30 Tab 6    furosemide (LASIX) 20 mg tablet Take 1 Tab by mouth two (2) times a day. 180 Tab 1    atorvastatin (LIPITOR) 20 mg tablet Take 1 Tab by mouth daily. 30 Tab 0    carvedilol (COREG) 25 mg tablet TAKE ONE TABLET BY MOUTH TWICE DAILY WITH  MEALS 60 Tab 3    losartan (COZAAR) 100 mg tablet Take 1 Tab by mouth daily. 30 Tab 6    spironolactone (ALDACTONE) 25 mg tablet Take one half tab daily 30 Tab 6    potassium chloride (KLOR-CON M10) 10 mEq tablet TAKE TWO TABLETS BY MOUTH ONCE DAILY 60 Tab 3    MISCELLANEOUS MEDICAL SUPPLY (COMPRESSION STOCKINGS) by Does Not Apply route. Wear while awake. Remove at HS.  LORazepam (ATIVAN) 1 mg tablet Take 1 Tab by mouth every eight (8) hours as needed. Max Daily Amount: 3 mg. Indications: ANXIETY, INSOMNIA 90 Tab 0    ARIPiprazole (ABILIFY) 5 mg tablet Take 5 mg by mouth daily.  aspirin delayed-release 81 mg tablet Take 81 mg by mouth daily. No current facility-administered medications on file prior to visit.       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 ([facility]     Cardiomyopathy ([facility]     CHF (congestive heart failure) ([facility]     CVA (cerebral infarction)     Depression     ED (erectile dysfunction)     Gastritis     Gout     HTN (hypertension)     Hyperlipemia, mixed     Hypoxia     Kidney disease, chronic, stage III (moderate, EGFR 30-59 ml/min)     Pre-diabetes     Sleep apnea     Vitamin D deficiency      Past Surgical History:   Procedure Laterality Date    HX ACL RECONSTRUCTION      HX APPENDECTOMY  2004    became septic from a rupture     Family History   Problem Relation Age of Onset    No Known Problems Mother     Hypertension Father     Diabetes Father     Cancer Father      prostate    No Known Problems Sister     Hypertension Maternal Grandmother     No Known Problems Maternal Grandfather     Alcohol abuse Paternal Grandmother     Bipolar Disorder Paternal Grandmother     No Known Problems Paternal Grandfather      Social History     Social History    Marital status:      Spouse name: N/A    Number of children: N/A    Years of education: N/A     Occupational History    Not on file. Social History Main Topics    Smoking status: Never Smoker    Smokeless tobacco: Never Used    Alcohol use No      Comment: holidays/ occasions    Drug use: No    Sexual activity: Yes     Partners: Female     Other Topics Concern     Service Yes     [employer]    Blood Transfusions No    Caffeine Concern No    Occupational Exposure No    Hobby Hazards No    Sleep Concern No     sleep apnea, has cpap    Stress Concern Yes     family related    Weight Concern No     Pt has lost 20lbs but is wanting to lose more    Special Diet Yes     no salt    Back Care No    Exercise No    Bike Helmet No    Seat Belt Yes    Self-Exams Yes     Social History Narrative         Review of Systems   Constitutional: Negative. Eyes: Negative. Respiratory: Negative. Negative for shortness of breath. Cardiovascular: Negative. Negative for chest pain and orthopnea. Musculoskeletal: Negative. Neurological: Negative. Negative for dizziness and headaches. Endo/Heme/Allergies: Negative. Psychiatric/Behavioral: Negative. Visit Vitals    /60 (BP 1 Location: Left arm, BP Patient Position: Sitting)    Pulse 64    Temp 98.7 °F (37.1 °C) (Oral)    Resp 16    Ht 5' 7\" (1.702 m)    Wt 277 lb (125.6 kg)    SpO2 98%    BMI 43.38 kg/m2       Physical Exam   Constitutional: He is oriented to person, place, and time. He appears well-developed and well-nourished. HENT:   Head: Normocephalic and atraumatic. Cardiovascular: Normal rate, regular rhythm, normal heart sounds and intact distal pulses. Exam reveals no gallop and no friction rub. No murmur heard. Pulmonary/Chest: Effort normal and breath sounds normal. No respiratory distress. He has no wheezes. He has no rales. Musculoskeletal: Normal range of motion. He exhibits no edema or tenderness. Neurological: He is alert and oriented to person, place, and time. No cranial nerve deficit. Coordination normal.   Skin: Skin is warm and dry. No rash noted. No erythema. No pallor. Psychiatric: He has a normal mood and affect. His behavior is normal. Thought content normal.   Nursing note and vitals reviewed. ASSESSMENT and PLAN    ICD-10-CM ICD-9-CM    1. Essential hypertension I10 401.9 AMB POC EKG ROUTINE W/ 12 LEADS, INTER & REP      METABOLIC PANEL, COMPREHENSIVE      URINALYSIS W/ RFLX MICROSCOPIC   2. CKD (chronic kidney disease) stage 3, GFR 30-59 ml/min N18.3 585.3 CBC WITH AUTOMATED DIFF      METABOLIC PANEL, COMPREHENSIVE      URINALYSIS W/ RFLX MICROSCOPIC   3. Pre-diabetes R73.03 790.29 HEMOGLOBIN A1C WITH EAG      CBC WITH AUTOMATED DIFF      METABOLIC PANEL, COMPREHENSIVE      MICROALBUMIN, UR, RAND W/ MICROALBUMIN/CREA RATIO      URINALYSIS W/ RFLX MICROSCOPIC   4.  Hyperlipemia, mixed I05.1 212.3 METABOLIC PANEL, COMPREHENSIVE      LIPID PANEL 5. Chronic diastolic [facility] ([facility] S88.40 201.20 METABOLIC PANEL, COMPREHENSIVE     428.0      Follow-up Disposition:  Return in about 3 months (around 2/28/2018).   current treatment plan is effective, no change in therapy

## 2017-11-29 NOTE — PROGRESS NOTES
Ngozi Badillo is a 45 y.o. male presents to office for CPE and follow up      1. Have you been to the ER, urgent care clinic or hospitalized since your last visit?  No           Health Maintenance items with a due date reviewed with patient:  Health Maintenance Due   Topic Date Due    Hepatitis C Screening  1965    DTaP/Tdap/Td series (1 - Tdap) 05/01/1984    Pneumococcal 19-64 Highest Risk (2 of 3 - PCV13) 05/13/2013

## 2017-11-29 NOTE — PROGRESS NOTES
HISTORY OF PRESENT ILLNESS  Gabo Gutierrez is a 45 y.o. male Presents today for a complete physical and preventative medicine exam.  Past medical history is significant for: Morbid obesity elevated blood glucose chronic kidney disease hypertension CHF and hyperlipidemia  Last colonoscopy 2 years ago normal  Last eye examination 4 years ago  Last dental exam 8 months ago  Last PSA last year    . Complete Physical   Pertinent negatives include no chest pain, no abdominal pain, no headaches and no shortness of breath. Allergies   Allergen Reactions    Pcn [Penicillins] Anaphylaxis and Angioedema    Coconut Swelling     Current Outpatient Prescriptions on File Prior to Visit   Medication Sig Dispense Refill    hydrALAZINE (APRESOLINE) 25 mg tablet TAKE ONE TABLET BY MOUTH THREE TIMES DAILY 90 Tab 3    NIFEdipine ER (PROCARDIA XL) 60 mg ER tablet TAKE ONE TABLET BY MOUTH ONCE DAILY 30 Tab 6    rosuvastatin (CRESTOR) 10 mg tablet Take 1 Tab by mouth nightly. 30 Tab 6    furosemide (LASIX) 20 mg tablet Take 1 Tab by mouth two (2) times a day. 180 Tab 1    atorvastatin (LIPITOR) 20 mg tablet Take 1 Tab by mouth daily. 30 Tab 0    carvedilol (COREG) 25 mg tablet TAKE ONE TABLET BY MOUTH TWICE DAILY WITH  MEALS 60 Tab 3    losartan (COZAAR) 100 mg tablet Take 1 Tab by mouth daily. 30 Tab 6    spironolactone (ALDACTONE) 25 mg tablet Take one half tab daily 30 Tab 6    potassium chloride (KLOR-CON M10) 10 mEq tablet TAKE TWO TABLETS BY MOUTH ONCE DAILY 60 Tab 3    MISCELLANEOUS MEDICAL SUPPLY (COMPRESSION STOCKINGS) by Does Not Apply route. Wear while awake. Remove at HS.  LORazepam (ATIVAN) 1 mg tablet Take 1 Tab by mouth every eight (8) hours as needed. Max Daily Amount: 3 mg. Indications: ANXIETY, INSOMNIA 90 Tab 0    ARIPiprazole (ABILIFY) 5 mg tablet Take 5 mg by mouth daily.  aspirin delayed-release 81 mg tablet Take 81 mg by mouth daily.        No current facility-administered medications on file prior to visit. 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 ([facility]     Cardiomyopathy ([facility]     CHF (congestive heart failure) (HCC)     CVA (cerebral infarction)     Depression     ED (erectile dysfunction)     Gastritis     Gout     HTN (hypertension)     Hyperlipemia, mixed     Hypoxia     Kidney disease, chronic, stage III (moderate, EGFR 30-59 ml/min)     Pre-diabetes     Sleep apnea     Vitamin D deficiency      Past Surgical History:   Procedure Laterality Date    HX ACL RECONSTRUCTION      HX APPENDECTOMY  2004    became septic from a rupture     Family History   Problem Relation Age of Onset    No Known Problems Mother     Hypertension Father     Diabetes Father     Cancer Father      prostate    No Known Problems Sister     Hypertension Maternal Grandmother     No Known Problems Maternal Grandfather     Alcohol abuse Paternal Grandmother     Bipolar Disorder Paternal Grandmother     No Known Problems Paternal Grandfather      Social History     Social History    Marital status:      Spouse name: N/A    Number of children: N/A    Years of education: N/A     Occupational History    Not on file. Social History Main Topics    Smoking status: Never Smoker    Smokeless tobacco: Never Used    Alcohol use No      Comment: holidays/ occasions    Drug use: No    Sexual activity: Yes     Partners: Female     Other Topics Concern     Service Yes     [employer]    Blood Transfusions No    Caffeine Concern No    Occupational Exposure No    Hobby Hazards No    Sleep Concern No     sleep apnea, has cpap    Stress Concern Yes     family related    Weight Concern No     Pt has lost 20lbs but is wanting to lose more    Special Diet Yes     no salt    Back Care No    Exercise No    Bike Helmet No    Seat Belt Yes    Self-Exams Yes     Social History Narrative     . Review of Systems   Constitutional: Negative. Negative for chills, diaphoresis, fever, malaise/fatigue and weight loss. HENT: Negative. Negative for congestion, ear discharge, ear pain, hearing loss, nosebleeds, sinus pain, sore throat and tinnitus. Eyes: Negative. Negative for blurred vision, double vision, photophobia, pain, discharge and redness. Respiratory: Negative. Negative for cough, hemoptysis, sputum production, shortness of breath and wheezing. Cardiovascular: Negative. Negative for chest pain, palpitations, orthopnea, claudication, leg swelling and PND. Gastrointestinal: Negative. Negative for abdominal pain, blood in stool, constipation, diarrhea, heartburn, melena, nausea and vomiting. Genitourinary: Negative. Negative for dysuria, flank pain, frequency, hematuria and urgency. Musculoskeletal: Negative. Negative for back pain, joint pain, myalgias and neck pain. Skin: Negative. Negative for itching and rash. Neurological: Negative. Negative for dizziness, tingling, tremors, sensory change, speech change, focal weakness, seizures, loss of consciousness, weakness and headaches. Endo/Heme/Allergies: Negative. Negative for environmental allergies and polydipsia. Does not bruise/bleed easily. Psychiatric/Behavioral: Positive for depression. Negative for hallucinations, memory loss, substance abuse and suicidal ideas. The patient is nervous/anxious. The patient does not have insomnia. Visit Vitals    /60 (BP 1 Location: Left arm, BP Patient Position: Sitting)    Pulse 64    Temp 98.7 °F (37.1 °C) (Oral)    Resp 16    Ht 5' 7\" (1.702 m)    Wt 277 lb (125.6 kg)    SpO2 98%    BMI 43.38 kg/m2       Physical Exam   Constitutional: He is oriented to person, place, and time. He appears well-developed and well-nourished. HENT:   Head: Normocephalic and atraumatic.    Right Ear: External ear normal.   Left Ear: External ear normal.   Nose: Nose normal.   Mouth/Throat: Oropharynx is clear and moist. No oropharyngeal exudate. Eyes: Conjunctivae and EOM are normal. Pupils are equal, round, and reactive to light. Right eye exhibits no discharge. Left eye exhibits no discharge. No scleral icterus. Neck: Normal range of motion. Neck supple. No JVD present. No tracheal deviation present. No thyromegaly present. Cardiovascular: Normal rate, regular rhythm, normal heart sounds and intact distal pulses. Exam reveals no gallop and no friction rub. No murmur heard. Pulmonary/Chest: Effort normal and breath sounds normal. No respiratory distress. He has no wheezes. He has no rales. He exhibits no tenderness. Abdominal: Soft. Bowel sounds are normal. He exhibits no distension and no mass. There is no tenderness. There is no rebound and no guarding. Musculoskeletal: Normal range of motion. He exhibits no edema, tenderness or deformity. Lymphadenopathy:     He has no cervical adenopathy. Neurological: He is alert and oriented to person, place, and time. No cranial nerve deficit. Coordination normal.   Skin: Skin is warm and dry. No rash noted. No erythema. No pallor. Psychiatric: He has a normal mood and affect. His behavior is normal. Judgment and thought content normal.   Nursing note and vitals reviewed. ASSESSMENT and PLAN    ICD-10-CM ICD-9-CM    1. Routine general medical examination at a health care facility Z00.00 V70.0    7. Screening for colon cancer Z12.11 V76.51    8. Screening for prostate cancer Z12.5 V76.44      Follow-up Disposition:  Return in about 3 months (around 2/28/2018).

## 2017-11-29 NOTE — MR AVS SNAPSHOT
Visit Information Date & Time Provider Department Dept. Phone Encounter #  
 11/29/2017 10:00 AM Bluford Gilford, MD [facility] [phone] [id number] Follow-up Instructions Return in about 3 months (around 2/28/2018). Follow-up and Disposition History Upcoming Health Maintenance Date Due Hepatitis C Screening 1965 DTaP/Tdap/Td series (1 - Tdap) 5/1/1984 Pneumococcal 19-64 Highest Risk (2 of 3 - PCV13) 5/13/2013 FOBT Q 1 YEAR AGE 50-75 5/23/2018 Allergies as of 11/29/2017  Review Complete On: 11/29/2017 By: Bluford Gilford, MD  
  
 Severity Noted Reaction Type Reactions Pcn [Penicillins] High 04/01/2015   Side Effect Anaphylaxis, Angioedema Coconut Low 04/01/2015   Side Effect Swelling Current Immunizations  Reviewed on 10/11/2017 Name Date Influenza Vaccine 10/17/2015, 10/17/2015 12:00 AM, 11/21/2014 12:00 AM  
 Influenza Vaccine (Quad) PF 10/11/2017 Pneumococcal Polysaccharide (PPSV-23) 5/13/2012 12:00 AM  
  
 Not reviewed this visit You Were Diagnosed With   
  
 Codes Comments Routine general medical examination at a health care facility    -  Primary ICD-10-CM: Z00.00 ICD-9-CM: V70.0 Essential hypertension     ICD-10-CM: I10 
ICD-9-CM: 401.9 CKD (chronic kidney disease) stage 3, GFR 30-59 ml/min     ICD-10-CM: N18.3 ICD-9-CM: 704. 3 Pre-diabetes     ICD-10-CM: R73.03 
ICD-9-CM: 790.29 Hyperlipemia, mixed     ICD-10-CM: E78.2 ICD-9-CM: 272.2 Chronic diastolic congestive heart failure (HCC)     ICD-10-CM: I50.32 
ICD-9-CM: 428.32, 428.0 Screening for colon cancer     ICD-10-CM: Z12.11 ICD-9-CM: V76.51 Screening for prostate cancer     ICD-10-CM: Z12.5 ICD-9-CM: V76.44 Vitals BP Pulse Temp Resp Height(growth percentile) Weight(growth percentile)  130/60 (BP 1 Location: Left arm, BP Patient Position: Sitting) 64 98.7 °F (37.1 °C) (Oral) 16 5' 7\" (1.702 m) 277 lb (125.6 kg) SpO2 BMI Smoking Status 98% 43.38 kg/m2 Never Smoker Vitals History BMI and BSA Data Body Mass Index Body Surface Area  
 43.38 kg/m 2 2.44 m 2 Preferred Pharmacy Pharmacy Name Phone [facility] [address] [facility] [phone] Your Updated Medication List  
  
   
This list is accurate as of: 11/29/17 11:23 AM.  Always use your most recent med list.  
  
  
  
  
 ABILIFY 5 mg tablet Generic drug:  ARIPiprazole Take 5 mg by mouth daily. aspirin delayed-release 81 mg tablet Take 81 mg by mouth daily. atorvastatin 20 mg tablet Commonly known as:  LIPITOR Take 1 Tab by mouth daily. carvedilol 25 mg tablet Commonly known as:  COREG  
TAKE ONE TABLET BY MOUTH TWICE DAILY WITH  MEALS  
  
 COMPRESSION STOCKINGS  
by Does Not Apply route. Wear while awake. Remove at HS.  
  
 furosemide 20 mg tablet Commonly known as:  LASIX Take 1 Tab by mouth two (2) times a day. hydrALAZINE 25 mg tablet Commonly known as:  APRESOLINE  
TAKE ONE TABLET BY MOUTH THREE TIMES DAILY LORazepam 1 mg tablet Commonly known as:  ATIVAN Take 1 Tab by mouth every eight (8) hours as needed. Max Daily Amount: 3 mg. Indications: ANXIETY, INSOMNIA  
  
 losartan 100 mg tablet Commonly known as:  COZAAR Take 1 Tab by mouth daily. NIFEdipine ER 60 mg ER tablet Commonly known as:  PROCARDIA XL  
TAKE ONE TABLET BY MOUTH ONCE DAILY potassium chloride 10 mEq tablet Commonly known as:  KLOR-CON M10  
TAKE TWO TABLETS BY MOUTH ONCE DAILY  
  
 rosuvastatin 10 mg tablet Commonly known as:  CRESTOR Take 1 Tab by mouth nightly. spironolactone 25 mg tablet Commonly known as:  ALDACTONE Take one half tab daily We Performed the Following AMB POC EKG ROUTINE W/ 12 LEADS, INTER & REP [38003 CPT(R)] CBC WITH AUTOMATED DIFF [03414 CPT(R)] HEMOGLOBIN A1C WITH EAG [16869 CPT(R)] LIPID PANEL [08692 CPT(R)] METABOLIC PANEL, COMPREHENSIVE [16092 CPT(R)] OCCULT BLOOD, IMMUNOASSAY (FIT) [id number] CPT(R)] PSA, DIAGNOSTIC (PROSTATE SPECIFIC AG) X657322 CPT(R)] Follow-up Instructions Return in about 3 months (around 2/28/2018). To-Do List   
 11/29/2017 Lab:  CBC WITH AUTOMATED DIFF   
  
 11/29/2017 Lab:  HEMOGLOBIN A1C WITH EAG   
  
 11/29/2017 Lab:  LIPID PANEL   
  
 11/29/2017 Lab:  METABOLIC PANEL, COMPREHENSIVE   
  
 11/29/2017 Lab:  MICROALBUMIN, UR, RAND W/ MICROALBUMIN/CREA RATIO   
  
 11/29/2017 Lab:  PSA, DIAGNOSTIC (PROSTATE SPECIFIC AG)   
  
 11/29/2017 Lab:  URINALYSIS W/ RFLX MICROSCOPIC   
  
 12/29/2017 Lab:  OCCULT BLOOD, IMMUNOASSAY (FIT) Introducing [facility] SERVICES! Deyvi Rich introduces Naymit patient portal. Now you can access parts of your medical record, email your doctor's office, and request medication refills online. 1. In your internet browser, go to [email or web address] 2. Click on the First Time User? Click Here link in the Sign In box. You will see the New Member Sign Up page. 3. Enter your Naymit Access Code exactly as it appears below. You will not need to use this code after youve completed the sign-up process. If you do not sign up before the expiration date, you must request a new code. · Naymit Access Code: [email or web address] Expires: 12/4/2017  4:45 PM 
 
4. Enter the last four digits of your Social Security Number (xxxx) and Date of Birth (mm/dd/yyyy) as indicated and click Submit. You will be taken to the next sign-up page. 5. Create a One-Songt ID. This will be your One-Songt login ID and cannot be changed, so think of one that is secure and easy to remember. 6. Create a One-Songt password. You can change your password at any time. 7. Enter your Password Reset Question and Answer. This can be used at a later time if you forget your password. 8. Enter your e-mail address. You will receive e-mail notification when new information is available in [address] 9. Click Sign Up. You can now view and download portions of your medical record. 10. Click the Download Summary menu link to download a portable copy of your medical information. If you have questions, please visit the Frequently Asked Questions section of the Fyusion website. Remember, Fyusion is NOT to be used for urgent needs. For medical emergencies, dial 911. Now available from your iPhone and Android! Please provide this summary of care documentation to your next provider. Your primary care clinician is listed as Ildefonso Gay. If you have any questions after today's visit, please call [phone].

## 2017-11-30 LAB
A-G RATIO,AGRAT: 1.3 RATIO (ref 1.1–2.6)
ABSOLUTE LYMPHOCYTE COUNT, 10803: 1.9 K/UL (ref 1–4.8)
ALBUMIN SERPL-MCNC: 4.2 G/DL (ref 3.5–5)
ALP SERPL-CCNC: 90 U/L (ref 25–115)
ALT SERPL-CCNC: 27 U/L (ref 5–40)
ANION GAP SERPL CALC-SCNC: 18 MMOL/L
AST SERPL W P-5'-P-CCNC: 19 U/L (ref 10–37)
AVG GLU, 10930: 120 MG/DL (ref 91–123)
BASOPHILS # BLD: 0.1 K/UL (ref 0–0.2)
BASOPHILS NFR BLD: 1 % (ref 0–2)
BILIRUB SERPL-MCNC: 0.2 MG/DL (ref 0.2–1.2)
BUN SERPL-MCNC: 21 MG/DL (ref 6–22)
CALCIUM SERPL-MCNC: 8.9 MG/DL (ref 8.4–10.4)
CHLORIDE SERPL-SCNC: 102 MMOL/L (ref 98–110)
CHOLEST SERPL-MCNC: 97 MG/DL (ref 110–200)
CO2 SERPL-SCNC: 23 MMOL/L (ref 20–32)
CREAT SERPL-MCNC: 1.7 MG/DL (ref 0.5–1.2)
EOSINOPHIL # BLD: 0.3 K/UL (ref 0–0.5)
EOSINOPHIL NFR BLD: 3 % (ref 0–6)
ERYTHROCYTE [DISTWIDTH] IN BLOOD BY AUTOMATED COUNT: 13.4 % (ref 10–16)
GFRAA, 66117: 50.5
GFRNA, 66118: 41.6
GLOBULIN,GLOB: 3.2 G/DL (ref 2–4)
GLUCOSE SERPL-MCNC: 98 MG/DL (ref 70–99)
GRANULOCYTES,GRANS: 66 % (ref 40–75)
HBA1C MFR BLD HPLC: 5.8 % (ref 4.8–5.9)
HCT VFR BLD AUTO: 43.1 % (ref 39.3–51.6)
HDLC SERPL-MCNC: 37 MG/DL (ref 40–59)
HGB BLD-MCNC: 13.6 G/DL (ref 13.1–17.2)
LDLC SERPL CALC-MCNC: 18 MG/DL (ref 50–99)
LYMPHOCYTES, LYMLT: 21 % (ref 27–45)
MCH RBC QN AUTO: 32 PG (ref 26–34)
MCHC RBC AUTO-ENTMCNC: 32 G/DL (ref 32–36)
MCV RBC AUTO: 101 FL (ref 80–95)
MONOCYTES # BLD: 0.8 K/UL (ref 0.1–0.9)
MONOCYTES NFR BLD: 9 % (ref 3–9)
NEUTROPHILS # BLD AUTO: 6.1 K/UL (ref 1.8–7.7)
PLATELET # BLD AUTO: 254 K/UL (ref 140–440)
PMV BLD AUTO: 10 FL (ref 6–10.8)
POTASSIUM SERPL-SCNC: 4.8 MMOL/L (ref 3.5–5.5)
PROT SERPL-MCNC: 7.4 G/DL (ref 6.4–8.3)
PSA SERPL-MCNC: 1.31 NG/ML
RBC # BLD AUTO: 4.29 M/UL (ref 3.8–5.8)
SODIUM SERPL-SCNC: 143 MMOL/L (ref 133–145)
TRIGL SERPL-MCNC: 208 MG/DL (ref 40–149)
VLDLC SERPL CALC-MCNC: 42 MG/DL (ref 8–30)
WBC # BLD AUTO: 9.3 K/UL (ref 4–11)

## 2017-12-04 DIAGNOSIS — I10 ESSENTIAL HYPERTENSION: ICD-10-CM

## 2017-12-04 DIAGNOSIS — E78.2 HYPERLIPEMIA, MIXED: ICD-10-CM

## 2017-12-04 DIAGNOSIS — I50.32 CHRONIC DIASTOLIC CONGESTIVE HEART FAILURE (HCC): ICD-10-CM

## 2017-12-04 NOTE — TELEPHONE ENCOUNTER
Pt calling to request medication refill of:    Requested Prescriptions     Pending Prescriptions Disp Refills    losartan (COZAAR) 100 mg tablet [Pharmacy Med Name: LOSARTAN 100MG   TAB] 30 Tab 6     Sig: TAKE ONE TABLET BY MOUTH ONCE DAILY          be sent to [address] [place] [address]. Pt has about 5 tabs remaining. Pts last appt was 11/29, next appt sched for 03/01/18. Advised pt of 72 hour time frame for refill requests. Please advise.

## 2017-12-05 RX ORDER — LOSARTAN POTASSIUM 100 MG/1
TABLET ORAL
Qty: 30 TAB | Refills: 6 | Status: SHIPPED | OUTPATIENT
Start: 2017-12-05 | End: 2018-05-03 | Stop reason: SDUPTHER

## 2017-12-12 RX ORDER — CARVEDILOL 25 MG/1
TABLET ORAL
Qty: 60 TAB | Refills: 3 | Status: SHIPPED | OUTPATIENT
Start: 2017-12-12 | End: 2017-12-12 | Stop reason: SDUPTHER

## 2017-12-12 NOTE — TELEPHONE ENCOUNTER
Pt calling to request med refill of:  Requested Prescriptions     Pending Prescriptions Disp Refills    carvedilol (COREG) 25 mg tablet 60 Tab 3     Be sent to  [address] [place] [address]    Pt has 5 tabs remaining     Pts last appt was  11/29/17 & next appt [facility] for 3/1/18    Pt advised of 72 hour time frame. Please advise.

## 2017-12-13 NOTE — TELEPHONE ENCOUNTER
Dr. Brenna Barreto, please see refill request for patient, thank you!     Requested Prescriptions     Pending Prescriptions Disp Refills    carvedilol (COREG) 25 mg tablet 60 Tab 3

## 2017-12-14 DIAGNOSIS — I50.33 ACUTE ON CHRONIC DIASTOLIC CONGESTIVE HEART FAILURE (HCC): ICD-10-CM

## 2017-12-14 RX ORDER — POTASSIUM CHLORIDE 750 MG/1
TABLET, EXTENDED RELEASE ORAL
Qty: 60 TAB | Refills: 3 | Status: SHIPPED | OUTPATIENT
Start: 2017-12-14 | End: 2018-04-08 | Stop reason: SDUPTHER

## 2017-12-14 RX ORDER — CARVEDILOL 25 MG/1
TABLET ORAL
Qty: 90 TAB | Refills: 1 | Status: SHIPPED | OUTPATIENT
Start: 2017-12-14 | End: 2018-05-14 | Stop reason: SDUPTHER

## 2018-01-30 ENCOUNTER — OFFICE VISIT (OUTPATIENT)
Dept: FAMILY MEDICINE CLINIC | Age: 53
End: 2018-01-30

## 2018-01-30 VITALS
DIASTOLIC BLOOD PRESSURE: 62 MMHG | SYSTOLIC BLOOD PRESSURE: 126 MMHG | HEART RATE: 69 BPM | WEIGHT: 274 LBS | HEIGHT: 67 IN | TEMPERATURE: 95.7 F | BODY MASS INDEX: 43 KG/M2 | OXYGEN SATURATION: 97 % | RESPIRATION RATE: 16 BRPM

## 2018-01-30 DIAGNOSIS — I50.32 CHRONIC DIASTOLIC CONGESTIVE HEART FAILURE (HCC): ICD-10-CM

## 2018-01-30 DIAGNOSIS — I10 ESSENTIAL HYPERTENSION: Primary | ICD-10-CM

## 2018-01-30 DIAGNOSIS — N18.30 STAGE 3 CHRONIC KIDNEY DISEASE (HCC): ICD-10-CM

## 2018-01-30 DIAGNOSIS — I10 ESSENTIAL HYPERTENSION: ICD-10-CM

## 2018-01-30 DIAGNOSIS — E78.2 HYPERLIPEMIA, MIXED: ICD-10-CM

## 2018-01-30 NOTE — MR AVS SNAPSHOT
[address] [facility] 
 
 
 [address] [facility] [address] [facility] [address] 
[phone] Patient: Alesia Velázquez MRN: [id number] PHZ:[birth year] Visit Information Date & Time Provider Department Dept. Phone Encounter #  
 1/30/2018 10:15 AM Tessie Bryant MD [facility] [phone] [id number] Follow-up Instructions Return in about 4 months (around 5/30/2018). Follow-up and Disposition History Your Appointments 3/1/2018  9:30 AM  
Follow Up with Tessie Bryant MD  
[facility]) Appt Note: 3 month f/u  
 [address] [facility] [address] [place] [address]  
[phone]  
  
   
 [facility] [address] [place] [id number] Upcoming Health Maintenance Date Due Hepatitis C Screening 1965 DTaP/Tdap/Td series (1 - Tdap) 5/1/1984 Pneumococcal 19-64 Highest Risk (2 of 3 - PCV13) 5/13/2013 FOBT Q 1 YEAR AGE 50-75 5/23/2018 Allergies as of 1/30/2018  Review Complete On: 1/30/2018 By: Tessie Bryant MD  
  
 Severity Noted Reaction Type Reactions Pcn [Penicillins] High 04/01/2015   Side Effect Anaphylaxis, Angioedema Coconut Low 04/01/2015   Side Effect Swelling Current Immunizations  Reviewed on 10/11/2017 Name Date Influenza Vaccine 10/17/2015, 10/17/2015 12:00 AM, 11/21/2014 12:00 AM  
 Influenza Vaccine (Quad) PF 10/11/2017 Pneumococcal Polysaccharide (PPSV-23) 5/13/2012 12:00 AM  
  
 Not reviewed this visit You Were Diagnosed With   
  
 Codes Comments Essential hypertension    -  Primary ICD-10-CM: I10 
ICD-9-CM: 401.9 Hyperlipemia, mixed     ICD-10-CM: E78.2 ICD-9-CM: 272.2 Chronic diastolic congestive heart failure (HCC)     ICD-10-CM: I50.32 
ICD-9-CM: 428.32, 428.0 Stage 3 chronic kidney disease     ICD-10-CM: N18.3 ICD-9-CM: 200. 3 Vitals BP Pulse Temp Resp Height(growth percentile) Weight(growth percentile) 126/62 (BP 1 Location: Left arm, BP Patient Position: Sitting) 69 95.7 °F (35.4 °C) (Oral) 16 5' 7\" (1.702 m) 274 lb (124.3 kg) SpO2 BMI Smoking Status 97% 42.91 kg/m2 Never Smoker Vitals History BMI and BSA Data Body Mass Index Body Surface Area 42.91 kg/m 2 2.42 m 2 Preferred Pharmacy Pharmacy Name Phone [address] [phone] Your Updated Medication List  
  
   
This list is accurate as of: 1/30/18 11:46 AM.  Always use your most recent med list.  
  
  
  
  
 ABILIFY 5 mg tablet Generic drug:  ARIPiprazole Take 5 mg by mouth daily. aspirin delayed-release 81 mg tablet Take 81 mg by mouth daily. atorvastatin 20 mg tablet Commonly known as:  LIPITOR Take 1 Tab by mouth daily. carvedilol 25 mg tablet Commonly known as:  COREG  
TAKE ONE TABLET BY MOUTH TWICE DAILY WITH MEALS  
  
 COMPRESSION STOCKINGS  
by Does Not Apply route. Wear while awake. Remove at HS.  
  
 furosemide 20 mg tablet Commonly known as:  LASIX Take 1 Tab by mouth two (2) times a day. hydrALAZINE 25 mg tablet Commonly known as:  APRESOLINE  
TAKE ONE TABLET BY MOUTH THREE TIMES DAILY LORazepam 1 mg tablet Commonly known as:  ATIVAN Take 1 Tab by mouth every eight (8) hours as needed. Max Daily Amount: 3 mg. Indications: ANXIETY, INSOMNIA  
  
 losartan 100 mg tablet Commonly known as:  COZAAR  
TAKE ONE TABLET BY MOUTH ONCE DAILY  
  
 NIFEdipine ER 60 mg ER tablet Commonly known as:  PROCARDIA XL  
TAKE ONE TABLET BY MOUTH ONCE DAILY potassium chloride 10 mEq tablet Commonly known as:  KLOR-CON M10  
TAKE TWO TABLETS BY MOUTH ONCE DAILY  
  
 rosuvastatin 10 mg tablet Commonly known as:  CRESTOR Take 1 Tab by mouth nightly. spironolactone 25 mg tablet Commonly known as:  ALDACTONE Take one half tab daily We Performed the Following METABOLIC PANEL, BASIC [71715 CPT(R)] Follow-up Instructions Return in about 4 months (around 5/30/2018). To-Do List   
 01/30/2018 Lab:  METABOLIC PANEL, BASIC Introducing [facility] SERVICES! Kary Costa introduces TakeCare patient portal. Now you can access parts of your medical record, email your doctor's office, and request medication refills online. 1. In your internet browser, go to [email or web address]. 2. Click on the First Time User? Click Here link in the Sign In box. You will see the New Member Sign Up page. 3. Enter your TakeCare Access Code exactly as it appears below. You will not need to use this code after youve completed the sign-up process. If you do not sign up before the expiration date, you must request a new code. · TakeCare Access Code: [email or web address] Expires: 4/30/2018 11:42 AM 
 
4. Enter the last four digits of your Social Security Number (xxxx) and Date of Birth (mm/dd/yyyy) as indicated and click Submit. You will be taken to the next sign-up page. 5. Create a TakeCare ID. This will be your TakeCare login ID and cannot be changed, so think of one that is secure and easy to remember. 6. Create a TakeCare password. You can change your password at any time. 7. Enter your Password Reset Question and Answer. This can be used at a later time if you forget your password. 8. Enter your e-mail address. You will receive e-mail notification when new information is available in [address] 9. Click Sign Up. You can now view and download portions of your medical record. 10. Click the Download Summary menu link to download a portable copy of your medical information. If you have questions, please visit the Frequently Asked Questions section of the TakeCare website.  Remember, TakeCare is NOT to be used for urgent needs. For medical emergencies, dial 911. Now available from your iPhone and Android! Please provide this summary of care documentation to your next provider. Your primary care clinician is listed as Bryan Client. If you have any questions after today's visit, please call [phone].

## 2018-01-30 NOTE — PROGRESS NOTES
Jorge Juarez is a 45 y.o. male presents to office for inconsistent BP       1. Have you been to the ER, urgent care clinic or hospitalized since your last visit?  No           Health Maintenance items with a due date reviewed with patient:  Health Maintenance Due   Topic Date Due    Hepatitis C Screening  1965    DTaP/Tdap/Td series (1 - Tdap) 05/01/1984    Pneumococcal 19-64 Highest Risk (2 of 3 - PCV13) 05/13/2013

## 2018-01-30 NOTE — PROGRESS NOTES
HISTORY OF PRESENT ILLNESS  Victoriano Wilder is a 45 y.o. male here for follow-up of elevated blood pressure and hyperlipidemia. And CHF morbid obesity and chronic kidney disease  Cholesterol Problem   The history is provided by the patient and medical records. This is a chronic problem. The problem has been gradually improving. Pertinent negatives include no chest pain, no headaches and no shortness of breath. The symptoms are aggravated by eating. The symptoms are relieved by medications. Hypertension    The history is provided by the patient and medical records. This is a chronic problem. The problem has been gradually improving. Pertinent negatives include no chest pain, no orthopnea, no headaches, no peripheral edema, no dizziness and no shortness of breath. There are no associated agents to hypertension. Risk factors include dyslipidemia, obesity and male gender. Chronic Kidney Disease   The history is provided by the patient and medical records. This is a chronic problem. The problem has been gradually improving. Pertinent negatives include no chest pain, no headaches and no shortness of breath. Nothing aggravates the symptoms. Nothing relieves the symptoms. He has tried nothing for the symptoms. Morbid Obesity   The history is provided by the medical records. This is a chronic problem. The problem has been gradually improving. Pertinent negatives include no chest pain, no headaches and no shortness of breath. The symptoms are aggravated by eating. Nothing relieves the symptoms. He has tried nothing for the symptoms. CHF   The history is provided by the patient and medical records. This is a chronic problem. The problem has been gradually improving. Pertinent negatives include no chest pain, no headaches and no shortness of breath. Nothing aggravates the symptoms. The symptoms are relieved by medications. Treatments tried: Lasix Spironolactone and losartan.  The treatment provided significant relief. Blood sugar problem   The history is provided by the patient and medical records. This is a chronic problem. The problem has been gradually improving. Pertinent negatives include no chest pain, no headaches and no shortness of breath. Nothing aggravates the symptoms. He has tried nothing for the symptoms. Allergies   Allergen Reactions    Pcn [Penicillins] Anaphylaxis and Angioedema    Coconut Swelling     Current Outpatient Prescriptions on File Prior to Visit   Medication Sig Dispense Refill    carvedilol (COREG) 25 mg tablet TAKE ONE TABLET BY MOUTH TWICE DAILY WITH MEALS 90 Tab 1    potassium chloride (KLOR-CON M10) 10 mEq tablet TAKE TWO TABLETS BY MOUTH ONCE DAILY 60 Tab 3    losartan (COZAAR) 100 mg tablet TAKE ONE TABLET BY MOUTH ONCE DAILY 30 Tab 6    hydrALAZINE (APRESOLINE) 25 mg tablet TAKE ONE TABLET BY MOUTH THREE TIMES DAILY 90 Tab 3    NIFEdipine ER (PROCARDIA XL) 60 mg ER tablet TAKE ONE TABLET BY MOUTH ONCE DAILY 30 Tab 6    rosuvastatin (CRESTOR) 10 mg tablet Take 1 Tab by mouth nightly. 30 Tab 6    furosemide (LASIX) 20 mg tablet Take 1 Tab by mouth two (2) times a day. 180 Tab 1    atorvastatin (LIPITOR) 20 mg tablet Take 1 Tab by mouth daily. 30 Tab 0    spironolactone (ALDACTONE) 25 mg tablet Take one half tab daily 30 Tab 6    MISCELLANEOUS MEDICAL SUPPLY (COMPRESSION STOCKINGS) by Does Not Apply route. Wear while awake. Remove at HS.  LORazepam (ATIVAN) 1 mg tablet Take 1 Tab by mouth every eight (8) hours as needed. Max Daily Amount: 3 mg. Indications: ANXIETY, INSOMNIA 90 Tab 0    ARIPiprazole (ABILIFY) 5 mg tablet Take 5 mg by mouth daily.  aspirin delayed-release 81 mg tablet Take 81 mg by mouth daily. No current facility-administered medications on file prior to visit.       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     Cardiomyopathy ([facility]     CHF (congestive heart failure) (HCC)     CVA (cerebral infarction)     Depression     ED (erectile dysfunction)     Gastritis     Gout     HTN (hypertension)     Hyperlipemia, mixed     Hypoxia     Kidney disease, chronic, stage III (moderate, EGFR 30-59 ml/min)     Pre-diabetes     Sleep apnea     Vitamin D deficiency      Past Surgical History:   Procedure Laterality Date    HX ACL RECONSTRUCTION      HX APPENDECTOMY  2004    became septic from a rupture     Family History   Problem Relation Age of Onset    No Known Problems Mother     Hypertension Father     Diabetes Father     Cancer Father      prostate    No Known Problems Sister     Hypertension Maternal Grandmother     No Known Problems Maternal Grandfather     Alcohol abuse Paternal Grandmother     Bipolar Disorder Paternal Grandmother     No Known Problems Paternal Grandfather      Social History     Social History    Marital status:      Spouse name: N/A    Number of children: N/A    Years of education: N/A     Occupational History    Not on file. Social History Main Topics    Smoking status: Never Smoker    Smokeless tobacco: Never Used    Alcohol use No      Comment: holidays/ occasions    Drug use: No    Sexual activity: Yes     Partners: Female     Other Topics Concern     Service Yes     [employer]    Blood Transfusions No    Caffeine Concern No    Occupational Exposure No    Hobby Hazards No    Sleep Concern No     sleep apnea, has cpap    Stress Concern Yes     family related    Weight Concern No     Pt has lost 20lbs but is wanting to lose more    Special Diet Yes     no salt    Back Care No    Exercise No    Bike Helmet No    Seat Belt Yes    Self-Exams Yes     Social History Narrative         Review of Systems   Constitutional: Negative. Eyes: Negative. Respiratory: Negative. Negative for shortness of breath. Cardiovascular: Negative. Negative for chest pain and orthopnea. Musculoskeletal: Negative. Neurological: Negative. Negative for dizziness and headaches. Endo/Heme/Allergies: Negative. Psychiatric/Behavioral: Negative. Visit Vitals    /62 (BP 1 Location: Left arm, BP Patient Position: Sitting)    Pulse 69    Temp 95.7 °F (35.4 °C) (Oral)    Resp 16    Ht 5' 7\" (1.702 m)    Wt 274 lb (124.3 kg)    SpO2 97%    BMI 42.91 kg/m2       Physical Exam   Constitutional: He is oriented to person, place, and time. He appears well-developed and well-nourished. HENT:   Head: Normocephalic and atraumatic. Cardiovascular: Normal rate, regular rhythm, normal heart sounds and intact distal pulses. Exam reveals no gallop and no friction rub. No murmur heard. Pulmonary/Chest: Effort normal and breath sounds normal. No respiratory distress. He has no wheezes. He has no rales. Musculoskeletal: Normal range of motion. He exhibits no edema or tenderness. Neurological: He is alert and oriented to person, place, and time. No cranial nerve deficit. Coordination normal.   Skin: Skin is warm and dry. No rash noted. No erythema. No pallor. Psychiatric: He has a normal mood and affect. His behavior is normal. Thought content normal.   Nursing note and vitals reviewed. ASSESSMENT and PLAN    ICD-10-CM ICD-9-CM    1. Essential hypertension S32 763.7 METABOLIC PANEL, BASIC   2. Hyperlipemia, mixed E78.2 272.2    3. Chronic diastolic congestive heart failure (HCC) I50.32 428.32      428.0    4. Stage 3 chronic kidney disease N18.3 585. 3      Follow-up Disposition:  Return in about 4 months (around 5/30/2018).

## 2018-01-31 LAB
ANION GAP SERPL CALC-SCNC: 17 MMOL/L
BUN SERPL-MCNC: 28 MG/DL (ref 6–22)
CALCIUM SERPL-MCNC: 9.3 MG/DL (ref 8.4–10.4)
CHLORIDE SERPL-SCNC: 99 MMOL/L (ref 98–110)
CO2 SERPL-SCNC: 23 MMOL/L (ref 20–32)
CREAT SERPL-MCNC: 1.9 MG/DL (ref 0.5–1.2)
GFRAA, 66117: 44.5
GFRNA, 66118: 36.7
GLUCOSE SERPL-MCNC: 103 MG/DL (ref 70–99)
POTASSIUM SERPL-SCNC: 4.7 MMOL/L (ref 3.5–5.5)
SODIUM SERPL-SCNC: 139 MMOL/L (ref 133–145)

## 2018-01-31 RX ORDER — SPIRONOLACTONE 25 MG/1
TABLET ORAL
Qty: 30 TAB | Refills: 6 | Status: SHIPPED | OUTPATIENT
Start: 2018-01-31 | End: 2019-01-31 | Stop reason: SDUPTHER

## 2018-01-31 NOTE — TELEPHONE ENCOUNTER
Pt calling to request medication refill of:    Requested Prescriptions     Pending Prescriptions Disp Refills    spironolactone (ALDACTONE) 25 mg tablet [Pharmacy Med Name: SPIRONOLACTONE 25MG    TAB] 30 Tab 6     Sig: TAKE ONE-HALF TABLET BY MOUTH ONCE DAILY        be sent to [address] [place] [address]. Pt has about 4 tabs remaining. Pts last appt was 01/30/18, next appt sched for 05/30/18. Advised pt of 72 hour time frame for refill requests. Please advise.

## 2018-03-04 DIAGNOSIS — N18.30 CKD (CHRONIC KIDNEY DISEASE) STAGE 3, GFR 30-59 ML/MIN (HCC): ICD-10-CM

## 2018-03-05 RX ORDER — HYDRALAZINE HYDROCHLORIDE 25 MG/1
TABLET, FILM COATED ORAL
Qty: 90 TAB | Refills: 6 | Status: SHIPPED | OUTPATIENT
Start: 2018-03-05 | End: 2018-05-16 | Stop reason: SDUPTHER

## 2018-03-05 NOTE — TELEPHONE ENCOUNTER
Dr. Ethan Shields, please see refill request for patient, thank you!     Requested Prescriptions     Pending Prescriptions Disp Refills    hydrALAZINE (APRESOLINE) 25 mg tablet [Pharmacy Med Name: HydrALAZINE 25MG    TAB] 90 Tab 3     Sig: TAKE ONE TABLET BY MOUTH THREE TIMES DAILY

## 2018-03-12 NOTE — TELEPHONE ENCOUNTER
Pt calling to request medication refill of:  Colchicine 0.6 mg  [facility]    Pt has about 0 tabs remaining. Pts last appt was 1/30, next appt sched for 5/30. Advised pt of 72 hour time frame for refill requests. Please advise.

## 2018-03-14 NOTE — TELEPHONE ENCOUNTER
Dr. Zapien Parent, please see refill request for patient, thank you! Requested Prescriptions     Pending Prescriptions Disp Refills    colchicine 0.6 mg tablet 90 Tab 0     Sig: Take 1 Tab by mouth daily.

## 2018-03-15 RX ORDER — COLCHICINE 0.6 MG/1
0.6 TABLET ORAL DAILY
Qty: 90 TAB | Refills: 0 | Status: SHIPPED | OUTPATIENT
Start: 2018-03-15 | End: 2019-06-12

## 2018-04-08 DIAGNOSIS — I50.33 ACUTE ON CHRONIC DIASTOLIC CONGESTIVE HEART FAILURE (HCC): ICD-10-CM

## 2018-04-09 RX ORDER — POTASSIUM CHLORIDE 750 MG/1
TABLET, EXTENDED RELEASE ORAL
Qty: 60 TAB | Refills: 3 | Status: SHIPPED | OUTPATIENT
Start: 2018-04-09 | End: 2018-08-28 | Stop reason: SDUPTHER

## 2018-05-03 DIAGNOSIS — E78.2 HYPERLIPEMIA, MIXED: ICD-10-CM

## 2018-05-03 DIAGNOSIS — I10 ESSENTIAL HYPERTENSION: ICD-10-CM

## 2018-05-03 DIAGNOSIS — I50.32 CHRONIC DIASTOLIC CONGESTIVE HEART FAILURE (HCC): ICD-10-CM

## 2018-05-03 RX ORDER — LOSARTAN POTASSIUM 100 MG/1
TABLET ORAL
Qty: 30 TAB | Refills: 6 | Status: SHIPPED | OUTPATIENT
Start: 2018-05-03 | End: 2018-11-28

## 2018-05-03 NOTE — TELEPHONE ENCOUNTER
Dr. Melvin Kirk, please see refill request for patient, thank you!     Requested Prescriptions     Pending Prescriptions Disp Refills    losartan (COZAAR) 100 mg tablet 30 Tab 6

## 2018-05-03 NOTE — TELEPHONE ENCOUNTER
Make sure patient is not using Lipitor and Crestor. His LDL is 18.   He should be using only Crestor

## 2018-05-03 NOTE — TELEPHONE ENCOUNTER
Pt calling to request med refill of:  Requested Prescriptions     Pending Prescriptions Disp Refills    losartan (COZAAR) 100 mg tablet 30 Tab 6     Be sent to [address]    Pt has 5 days remaining     Pts last appt was 1/30/18 & next appt Count includes the [facility] for 5/24/18    Pt advised of 72 hour time frame. Please advise.

## 2018-05-14 NOTE — TELEPHONE ENCOUNTER
Pt calling to request medication refill of:    Requested Prescriptions     Pending Prescriptions Disp Refills    furosemide (LASIX) 20 mg tablet 180 Tab 1     Sig: Take 1 Tab by mouth two (2) times a day.  carvedilol (COREG) 25 mg tablet 90 Tab 1     Sig: TAKE ONE TABLET BY MOUTH TWICE DAILY WITH MEALS          be sent to [address] [place] [address]. Pt has about 3 tabs remaining. Pts last appt was 01/30/18, next appt sched for 05/24/18. Advised pt of 72 hour time frame for refill requests. Please advise.

## 2018-05-15 RX ORDER — CARVEDILOL 25 MG/1
TABLET ORAL
Qty: 90 TAB | Refills: 1 | Status: SHIPPED | OUTPATIENT
Start: 2018-05-15 | End: 2018-08-28 | Stop reason: SDUPTHER

## 2018-05-15 RX ORDER — FUROSEMIDE 20 MG/1
20 TABLET ORAL 2 TIMES DAILY
Qty: 180 TAB | Refills: 1 | Status: SHIPPED | OUTPATIENT
Start: 2018-05-15 | End: 2018-08-28 | Stop reason: SDUPTHER

## 2018-05-16 ENCOUNTER — OFFICE VISIT (OUTPATIENT)
Dept: FAMILY MEDICINE CLINIC | Age: 53
End: 2018-05-16

## 2018-05-16 VITALS
SYSTOLIC BLOOD PRESSURE: 142 MMHG | RESPIRATION RATE: 20 BRPM | WEIGHT: 284 LBS | OXYGEN SATURATION: 98 % | BODY MASS INDEX: 44.57 KG/M2 | TEMPERATURE: 98.1 F | HEART RATE: 71 BPM | HEIGHT: 67 IN | DIASTOLIC BLOOD PRESSURE: 94 MMHG

## 2018-05-16 DIAGNOSIS — E78.2 HYPERLIPEMIA, MIXED: ICD-10-CM

## 2018-05-16 DIAGNOSIS — N18.30 CKD (CHRONIC KIDNEY DISEASE) STAGE 3, GFR 30-59 ML/MIN (HCC): ICD-10-CM

## 2018-05-16 DIAGNOSIS — E66.01 OBESITY, MORBID (HCC): ICD-10-CM

## 2018-05-16 DIAGNOSIS — I50.9 CONGESTIVE HEART FAILURE, UNSPECIFIED HF CHRONICITY, UNSPECIFIED HEART FAILURE TYPE (HCC): Primary | ICD-10-CM

## 2018-05-16 DIAGNOSIS — I10 ESSENTIAL HYPERTENSION: ICD-10-CM

## 2018-05-16 RX ORDER — FLUOXETINE 10 MG/1
CAPSULE ORAL DAILY
COMMUNITY
End: 2020-06-03 | Stop reason: SDUPTHER

## 2018-05-16 NOTE — PROGRESS NOTES
HISTORY OF PRESENT ILLNESS  Blanca Mckoy is a 52 y.o. male here for follow-up of CHF hypertension hyperlipidemia obesity. Both Crestor and Lipitor are listed as lipid-lowering agents. Last LDL was 18. Patient states that he is unsure if he is taking both meds. .  Hypertension    The history is provided by the patient and medical records. This is a chronic problem. The problem has been gradually improving. Pertinent negatives include no orthopnea, no peripheral edema and no dizziness. There are no associated agents to hypertension. Risk factors include dyslipidemia, obesity and male gender. Cholesterol Problem   The history is provided by the patient and medical records. This is a chronic problem. The problem has been gradually improving. The symptoms are aggravated by eating. The symptoms are relieved by medications. Chronic Kidney Disease   The history is provided by the patient and medical records. This is a chronic problem. The problem has been gradually worsening. Nothing aggravates the symptoms. Nothing relieves the symptoms. He has tried nothing for the symptoms. Morbid Obesity   The history is provided by the medical records. This is a chronic problem. The problem has been gradually improving. The symptoms are aggravated by eating. Nothing relieves the symptoms. He has tried nothing for the symptoms. CHF   The history is provided by the patient and medical records. This is a chronic problem. The problem has been gradually improving. Nothing aggravates the symptoms. The symptoms are relieved by medications. Treatments tried: Lasix Spironolactone and losartan. The treatment provided significant relief. Allergies   Allergen Reactions    Pcn [Penicillins] Anaphylaxis and Angioedema    Coconut Swelling     Current Outpatient Prescriptions on File Prior to Visit   Medication Sig Dispense Refill    furosemide (LASIX) 20 mg tablet Take 1 Tab by mouth two (2) times a day.  180 Tab 1    carvedilol (COREG) 25 mg tablet TAKE ONE TABLET BY MOUTH TWICE DAILY WITH MEALS 90 Tab 1    losartan (COZAAR) 100 mg tablet TAKE ONE TABLET BY MOUTH ONCE DAILY 30 Tab 6    KLOR-CON M10 10 mEq tablet TAKE TWO TABLETS BY MOUTH ONCE DAILY 60 Tab 3    colchicine 0.6 mg tablet Take 1 Tab by mouth daily. 90 Tab 0    spironolactone (ALDACTONE) 25 mg tablet TAKE ONE-HALF TABLET BY MOUTH ONCE DAILY 30 Tab 6    hydrALAZINE (APRESOLINE) 25 mg tablet TAKE ONE TABLET BY MOUTH THREE TIMES DAILY 90 Tab 3    NIFEdipine ER (PROCARDIA XL) 60 mg ER tablet TAKE ONE TABLET BY MOUTH ONCE DAILY 30 Tab 6    rosuvastatin (CRESTOR) 10 mg tablet Take 1 Tab by mouth nightly. 30 Tab 6    atorvastatin (LIPITOR) 20 mg tablet Take 1 Tab by mouth daily. 30 Tab 0    MISCELLANEOUS MEDICAL SUPPLY (COMPRESSION STOCKINGS) by Does Not Apply route. Wear while awake. Remove at HS.  LORazepam (ATIVAN) 1 mg tablet Take 1 Tab by mouth every eight (8) hours as needed. Max Daily Amount: 3 mg. Indications: ANXIETY, INSOMNIA 90 Tab 0    ARIPiprazole (ABILIFY) 5 mg tablet Take 5 mg by mouth daily.  aspirin delayed-release 81 mg tablet Take 81 mg by mouth daily. No current facility-administered medications on file prior to visit.       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 ([facility]     Cardiomyopathy ([facility]     CHF (congestive heart failure) (HCC)     CVA (cerebral infarction)     Depression     ED (erectile dysfunction)     Gastritis     Gout     HTN (hypertension)     Hyperlipemia, mixed     Hypoxia     Kidney disease, chronic, stage III (moderate, EGFR 30-59 ml/min)     Pre-diabetes     Sleep apnea     Vitamin D deficiency      Past Surgical History:   Procedure Laterality Date    HX ACL RECONSTRUCTION      HX APPENDECTOMY  2004    became septic from a rupture     Family History   Problem Relation Age of Onset    No Known Problems Mother     Hypertension Father     Diabetes Father    [address] [facility] [employer] [facility] Father      prostate    No Known Problems Sister     Hypertension Maternal Grandmother     No Known Problems Maternal Grandfather     Alcohol abuse Paternal Grandmother     Bipolar Disorder Paternal Grandmother     No Known Problems Paternal Grandfather          Review of Systems   Constitutional: Negative. Eyes: Negative. Respiratory: Negative. Cardiovascular: Negative. Negative for orthopnea. Musculoskeletal: Negative. Neurological: Negative. Negative for dizziness. Endo/Heme/Allergies: Negative. Psychiatric/Behavioral: Negative. Visit Vitals    BP (!) 142/94 (BP 1 Location: Right arm, BP Patient Position: Sitting)  Comment: manual BP    Pulse 71    Temp 98.1 °F (36.7 °C) (Oral)    Resp 20    Ht 5' 7\" (1.702 m)    Wt 284 lb (128.8 kg)    SpO2 98%    BMI 44.48 kg/m2       Physical Exam   Constitutional: He is oriented to person, place, and time. He appears well-developed and well-nourished. HENT:   Head: Normocephalic and atraumatic. Cardiovascular: Normal rate, regular rhythm, normal heart sounds and intact distal pulses. Exam reveals no gallop and no friction rub. No murmur heard. Pulmonary/Chest: Effort normal and breath sounds normal. No respiratory distress. He has no wheezes. He has no rales. Musculoskeletal: Normal range of motion. He exhibits no edema or tenderness. Neurological: He is alert and oriented to person, place, and time. No cranial nerve deficit. Coordination normal.   Skin: Skin is warm and dry. No rash noted. No erythema. No pallor. Psychiatric: He has a normal mood and affect. His behavior is normal. Thought content normal.   Nursing note and vitals reviewed. ASSESSMENT and PLAN    ICD-10-CM ICD-9-CM    1. Congestive heart failure, unspecified HF chronicity, unspecified heart failure type ([facility] I50.9 428.0    2. Hyperlipemia, mixed E78.2 272.2 LIPID PANEL   3. Essential hypertension I10 401.9 URINALYSIS W/ RFLX MICROSCOPIC   4. CKD (chronic kidney disease) stage 3, GFR 30-59 ml/min N18.3 585.3 CBC WITH AUTOMATED DIFF      METABOLIC PANEL, COMPREHENSIVE      URINALYSIS W/ RFLX MICROSCOPIC   5. [facility] E66.01 278.01      Follow-up Disposition:  Return in about 6 weeks (around 6/27/2018).   Patient is to check meds at home if he is taking Lipitor and Crestor he has been instructed to stop the Lipitor

## 2018-05-16 NOTE — PROGRESS NOTES
Garrett Tran is a 52 y.o. male (: 1965) presenting to address:    Chief Complaint   Patient presents with    Hypertension     follow up       Vitals:    18 1452   BP: (!) 142/94   Pulse: 71   Resp: 20   Temp: 98.1 °F (36.7 °C)   TempSrc: Oral   SpO2: 98%   Weight: 284 lb (128.8 kg)   Height: 5' 7\" (1.702 m)   PainSc:   7   PainLoc: Eye       Hearing/Vision:   No exam data present    Learning Assessment:     Learning Assessment 2015   PRIMARY LEARNER Patient   PRIMARY LANGUAGE ENGLISH   LEARNER PREFERENCE PRIMARY DEMONSTRATION     OTHER (COMMENT)   ANSWERED BY Patient   RELATIONSHIP SELF     Depression Screening:     PHQ over the last two weeks 2018   Little interest or pleasure in doing things Several days   Feeling down, depressed or hopeless Several days   Total Score PHQ 2 2   Thoughts of being better off dead, or hurting yourself in some way -     Fall Risk Assessment:     Fall Risk Assessment, last 12 mths 10/11/2017   Able to walk? Yes   Fall in past 12 months? No     Abuse Screening:     Abuse Screening Questionnaire 2018   Do you ever feel afraid of your partner? N   Are you in a relationship with someone who physically or mentally threatens you? N   Is it safe for you to go home? Y     Coordination of Care Questionaire:   1. Have you been to the ER, urgent care clinic since your last visit? Hospitalized since your last visit? NO    2. Have you seen or consulted any other health care providers outside of the [address] [place] [address] since your last visit? Include any pap smears or colon screening.  NO

## 2018-05-16 NOTE — MR AVS SNAPSHOT
303 [facility] 
 
 
 120 [facility] 114 [facility] [address] 
[phone] Patient: Julia Villegas MRN: [id number] NKZ:[id number] Visit Information Date & Time Provider Department Dept. Phone Encounter #  
 5/16/2018  2:45 PM Trudi Hammans, MD [place] [phone] [id number] Follow-up Instructions Return in about 6 weeks (around 6/27/2018). Follow-up and Disposition History Your Appointments 6/27/2018  2:45 PM  
Follow Up with Trudi Hammans, MD  
[facility]) Appt Note: Return in about 6 weeks (around 6/27/2018). 120 [facility] [address] [place] [address]  
[phone]  
  
   
 [facility] [address] [place] [id number] Upcoming Health Maintenance Date Due Hepatitis C Screening 1965 DTaP/Tdap/Td series (1 - Tdap) 5/1/1984 Pneumococcal 19-64 Highest Risk (2 of 3 - PCV13) 5/13/2013 FOBT Q 1 YEAR AGE 50-75 5/23/2018 Influenza Age 5 to Adult 8/1/2018 Allergies as of 5/16/2018  Review Complete On: 5/16/2018 By: Trudi Hammans, MD  
  
 Severity Noted Reaction Type Reactions Pcn [Penicillins] High 04/01/2015   Side Effect Anaphylaxis, Angioedema Coconut Low 04/01/2015   Side Effect Swelling Current Immunizations  Reviewed on 10/11/2017 Name Date Influenza Vaccine 10/17/2015, 10/17/2015 12:00 AM, 11/21/2014 12:00 AM  
 Influenza Vaccine (Quad) PF 10/11/2017 Pneumococcal Polysaccharide (PPSV-23) 5/13/2012 12:00 AM  
  
 Not reviewed this visit You Were Diagnosed With   
  
 Codes Comments Congestive heart failure, unspecified HF chronicity, unspecified heart failure type ([facility]    -  Primary ICD-10-CM: I50.9 ICD-9-CM: 428.0 Hyperlipemia, mixed     ICD-10-CM: E78.2 ICD-9-CM: 272.2  Essential hypertension     ICD-10-CM: I10 
 ICD-9-CM: 401.9 CKD (chronic kidney disease) stage 3, GFR 30-59 ml/min     ICD-10-CM: N18.3 ICD-9-CM: 954. 3 Ob[facility], morbid [facility]     ICD-10-CM: E66.01 
ICD-9-CM: 278.01 Vitals BP Pulse Temp Resp Height(growth percentile) Weight(growth percentile) (!) 142/94 (BP 1 Location: Right arm, BP Patient Position: Sitting) 71 98.1 °F (36.7 °C) (Oral) 20 5' 7\" (1.702 m) 284 lb (128.8 kg) SpO2 BMI Smoking Status 98% 44.48 kg/m2 Never Smoker Vitals History BMI and BSA Data Body Mass Index Body Surface Area 44.48 kg/m 2 2.47 m 2 Preferred Pharmacy Pharmacy Name Phone [facility] [address], [address] [phone] Your Updated Medication List  
  
   
This list is accurate as of 5/16/18  4:04 PM.  Always use your most recent med list.  
  
  
  
  
 ABILIFY 5 mg tablet Generic drug:  ARIPiprazole Take 5 mg by mouth daily. aspirin delayed-release 81 mg tablet Take 81 mg by mouth daily. atorvastatin 20 mg tablet Commonly known as:  LIPITOR Take 1 Tab by mouth daily. carvedilol 25 mg tablet Commonly known as:  COREG  
TAKE ONE TABLET BY MOUTH TWICE DAILY WITH MEALS  
  
 colchicine 0.6 mg tablet Take 1 Tab by mouth daily. COMPRESSION STOCKINGS  
by Does Not Apply route. Wear while awake. Remove at HS.  
  
 furosemide 20 mg tablet Commonly known as:  LASIX Take 1 Tab by mouth two (2) times a day. hydrALAZINE 25 mg tablet Commonly known as:  APRESOLINE  
TAKE ONE TABLET BY MOUTH THREE TIMES DAILY  
  
 KLOR-CON M10 10 mEq tablet Generic drug:  potassium chloride TAKE TWO TABLETS BY MOUTH ONCE DAILY LORazepam 1 mg tablet Commonly known as:  ATIVAN Take 1 Tab by mouth every eight (8) hours as needed. Max Daily Amount: 3 mg. Indications: ANXIETY, INSOMNIA  
  
 losartan 100 mg tablet Commonly known as:  COZAAR  
TAKE ONE TABLET BY MOUTH ONCE DAILY NIFEdipine ER 60 mg ER tablet Commonly known as:  PROCARDIA XL  
TAKE ONE TABLET BY MOUTH ONCE DAILY PROzac 10 mg capsule Generic drug:  FLUoxetine Take  by mouth daily. rosuvastatin 10 mg tablet Commonly known as:  CRESTOR Take 1 Tab by mouth nightly. spironolactone 25 mg tablet Commonly known as:  ALDACTONE  
TAKE ONE-HALF TABLET BY MOUTH ONCE DAILY We Performed the Following CBC WITH AUTOMATED DIFF [43089 CPT(R)] LIPID PANEL [88863 CPT(R)] METABOLIC PANEL, COMPREHENSIVE [53536 CPT(R)] URINALYSIS W/ RFLX MICROSCOPIC [45131 CPT(R)] Follow-up Instructions Return in about 6 weeks (around 6/27/2018). To-Do List   
 05/16/2018 Lab:  CBC WITH AUTOMATED DIFF   
  
 05/16/2018 Lab:  LIPID PANEL   
  
 05/16/2018 Lab:  METABOLIC PANEL, COMPREHENSIVE   
  
 05/16/2018 Lab:  URINALYSIS W/ RFLX MICROSCOPIC Introducing [facility] & HEALTH SERVICES! Jojo Macdonald introduces [employer] patient portal. Now you can access parts of your medical record, email your doctor's office, and request medication refills online. 1. In your internet browser, go to [email or web address] 2. Click on the First Time User? Click Here link in the Sign In box. You will see the New Member Sign Up page. 3. Enter your [employer] Access Code exactly as it appears below. You will not need to use this code after youve completed the sign-up process. If you do not sign up before the expiration date, you must request a new code. · [employer] Access Code: [email or web address] M[address]iel Expires: 8/14/2018  4:04 PM 
 
4. Enter the last four digits of your Social Security Number (xxxx) and Date of Birth (mm/dd/yyyy) as indicated and click Submit. You will be taken to the next sign-up page. 5. Create a [employer] ID. This will be your [employer] login ID and cannot be changed, so think of one that is secure and easy to remember. 6. Create a [employer] password. You can change your password at any time. 7. Enter your Password Reset Question and Answer. This can be used at a later time if you forget your password. 8. Enter your e-mail address. You will receive e-mail notification when new information is available in [address] 9. Click Sign Up. You can now view and download portions of your medical record. 10. Click the Download Summary menu link to download a portable copy of your medical information. If you have questions, please visit the Frequently Asked Questions section of the [employer] website. Remember, [employer] is NOT to be used for urgent needs. For medical emergencies, dial 911. Now available from your iPhone and Android! Please provide this summary of care documentation to your next provider. Your primary care clinician is listed as Penelope Serrano. If you have any questions after today's visit, please call [phone].

## 2018-05-17 ENCOUNTER — TELEPHONE (OUTPATIENT)
Dept: FAMILY MEDICINE CLINIC | Age: 53
End: 2018-05-17

## 2018-05-17 LAB
A-G RATIO,AGRAT: 1.5 RATIO (ref 1.1–2.6)
ABSOLUTE LYMPHOCYTE COUNT, 10803: 2.4 K/UL (ref 1–4.8)
ALBUMIN SERPL-MCNC: 4.7 G/DL (ref 3.5–5)
ALP SERPL-CCNC: 101 U/L (ref 25–115)
ALT SERPL-CCNC: 26 U/L (ref 5–40)
ANION GAP SERPL CALC-SCNC: 18 MMOL/L
AST SERPL W P-5'-P-CCNC: 23 U/L (ref 10–37)
BASOPHILS # BLD: 0 K/UL (ref 0–0.2)
BASOPHILS NFR BLD: 0 % (ref 0–2)
BILIRUB SERPL-MCNC: 0.3 MG/DL (ref 0.2–1.2)
BUN SERPL-MCNC: 26 MG/DL (ref 6–22)
CALCIUM SERPL-MCNC: 9.2 MG/DL (ref 8.4–10.4)
CHLORIDE SERPL-SCNC: 99 MMOL/L (ref 98–110)
CHOLEST SERPL-MCNC: 123 MG/DL (ref 110–200)
CO2 SERPL-SCNC: 25 MMOL/L (ref 20–32)
CREAT SERPL-MCNC: 1.9 MG/DL (ref 0.5–1.2)
EOSINOPHIL # BLD: 0.3 K/UL (ref 0–0.5)
EOSINOPHIL NFR BLD: 3 % (ref 0–6)
ERYTHROCYTE [DISTWIDTH] IN BLOOD BY AUTOMATED COUNT: 13.6 % (ref 10–15.5)
GFRAA, 66117: 44.3
GFRNA, 66118: 36.5
GLOBULIN,GLOB: 3.2 G/DL (ref 2–4)
GLUCOSE SERPL-MCNC: 96 MG/DL (ref 70–99)
GRANULOCYTES,GRANS: 65 % (ref 40–75)
HCT VFR BLD AUTO: 42.7 % (ref 39.3–51.6)
HDLC SERPL-MCNC: 2.6 MG/DL (ref 0–5)
HDLC SERPL-MCNC: 48 MG/DL (ref 40–59)
HGB BLD-MCNC: 14 G/DL (ref 13.1–17.2)
LDLC SERPL CALC-MCNC: 33 MG/DL (ref 50–99)
LYMPHOCYTES, LYMLT: 24 % (ref 20–45)
MCH RBC QN AUTO: 32 PG (ref 26–34)
MCHC RBC AUTO-ENTMCNC: 33 G/DL (ref 31–36)
MCV RBC AUTO: 98 FL (ref 80–95)
MONOCYTES # BLD: 0.8 K/UL (ref 0.1–1)
MONOCYTES NFR BLD: 8 % (ref 3–12)
NEUTROPHILS # BLD AUTO: 6.6 K/UL (ref 1.8–7.7)
PLATELET # BLD AUTO: 256 K/UL (ref 140–440)
PMV BLD AUTO: 9.7 FL (ref 9–13)
POTASSIUM SERPL-SCNC: 4.7 MMOL/L (ref 3.5–5.5)
PROT SERPL-MCNC: 7.9 G/DL (ref 6.4–8.3)
RBC # BLD AUTO: 4.36 M/UL (ref 3.8–5.8)
SODIUM SERPL-SCNC: 142 MMOL/L (ref 133–145)
TRIGL SERPL-MCNC: 212 MG/DL (ref 40–149)
VLDLC SERPL CALC-MCNC: 42 MG/DL (ref 8–30)
WBC # BLD AUTO: 10.1 K/UL (ref 4–11)

## 2018-05-17 NOTE — PROGRESS NOTES
Kidney function is still decreased but stable.   All other labs are essentially within normal limits

## 2018-05-17 NOTE — TELEPHONE ENCOUNTER
Patient was informed of lab results and stated he is not taking his cholesterol medication. Patient showed verbal understanding.

## 2018-05-17 NOTE — TELEPHONE ENCOUNTER
----- Message from Trish Garvin MD sent at 5/17/2018  2:14 PM EDT -----  Kidney function is still decreased but stable.   All other labs are essentially within normal limits

## 2018-06-02 DIAGNOSIS — I10 ESSENTIAL HYPERTENSION WITH GOAL BLOOD PRESSURE LESS THAN 140/90: ICD-10-CM

## 2018-06-04 NOTE — TELEPHONE ENCOUNTER
Dr. Oralia Archer, please see refill request for patient, thank you!     Requested Prescriptions     Pending Prescriptions Disp Refills    NIFEdipine ER (PROCARDIA XL) 60 mg ER tablet [Pharmacy Med Name: NIFEDIPINE ER OSMOTIC 60MG TAB] 90 Tab 1     Sig: TAKE ONE TABLET BY MOUTH ONCE DAILY

## 2018-06-05 RX ORDER — NIFEDIPINE 60 MG/1
TABLET, EXTENDED RELEASE ORAL
Qty: 90 TAB | Refills: 1 | Status: SHIPPED | OUTPATIENT
Start: 2018-06-05 | End: 2018-12-04 | Stop reason: SDUPTHER

## 2018-06-19 ENCOUNTER — TELEPHONE (OUTPATIENT)
Dept: FAMILY MEDICINE CLINIC | Age: 53
End: 2018-06-19

## 2018-06-19 DIAGNOSIS — I50.9 CONGESTIVE HEART FAILURE, UNSPECIFIED HF CHRONICITY, UNSPECIFIED HEART FAILURE TYPE (HCC): Primary | ICD-10-CM

## 2018-06-19 NOTE — TELEPHONE ENCOUNTER
Pt called requesting a referral to Cardiology   Pt states he has not seen a heart doctor in 3 years and he would like to see [place]     P: [phone]      Please advise.

## 2018-06-27 ENCOUNTER — OFFICE VISIT (OUTPATIENT)
Dept: FAMILY MEDICINE CLINIC | Age: 53
End: 2018-06-27

## 2018-06-27 VITALS
HEIGHT: 67 IN | SYSTOLIC BLOOD PRESSURE: 124 MMHG | OXYGEN SATURATION: 96 % | HEART RATE: 65 BPM | BODY MASS INDEX: 45.11 KG/M2 | DIASTOLIC BLOOD PRESSURE: 78 MMHG | RESPIRATION RATE: 18 BRPM | WEIGHT: 287.4 LBS

## 2018-06-27 DIAGNOSIS — N18.30 STAGE 3 CHRONIC KIDNEY DISEASE (HCC): ICD-10-CM

## 2018-06-27 DIAGNOSIS — R06.09 DYSPNEA ON EXERTION: ICD-10-CM

## 2018-06-27 DIAGNOSIS — E66.01 OBESITY, MORBID (HCC): Primary | ICD-10-CM

## 2018-06-27 DIAGNOSIS — I50.9 CONGESTIVE HEART FAILURE, UNSPECIFIED HF CHRONICITY, UNSPECIFIED HEART FAILURE TYPE (HCC): ICD-10-CM

## 2018-06-27 DIAGNOSIS — I10 ESSENTIAL HYPERTENSION: ICD-10-CM

## 2018-06-27 NOTE — MR AVS SNAPSHOT
[address] [place] [address] 
[phone] Patient: Angel Ayon MRN: [id number] Visit Information Date & Time Provider Department Dept. Phone Encounter #  
 6/27/2018  2:45 PM Nick Yun MD [facility] [place] [phone] [id number] Follow-up Instructions Return in about 4 weeks (around 7/25/2018). Follow-up and Disposition History Upcoming Health Maintenance Date Due Hepatitis C Screening 1965 DTaP/Tdap/Td series (1 - Tdap) 5/1/1984 Pneumococcal 19-64 Highest Risk (2 of 3 - PCV13) 5/13/2013 FOBT Q 1 YEAR AGE 50-75 5/23/2018 Influenza Age 5 to Adult 8/1/2018 Allergies as of 6/27/2018  Review Complete On: 6/27/2018 By: Nick Yun MD  
  
 Severity Noted Reaction Type Reactions Pcn [Penicillins] High 04/01/2015   Side Effect Anaphylaxis, Angioedema Coconut Low 04/01/2015   Side Effect Swelling Current Immunizations  Reviewed on 10/11/2017 Name Date Influenza Vaccine 10/17/2015, 10/17/2015 12:00 AM, 11/21/2014 12:00 AM  
 Influenza Vaccine (Quad) PF 10/11/2017 Pneumococcal Polysaccharide (PPSV-23) 5/13/2012 12:00 AM  
  
 Not reviewed this visit You Were Diagnosed With   
  
 Codes Comments [facility]    -  Primary ICD-10-CM: E66.01 
ICD-9-CM: 278.01 Congestive heart failure, unspecified HF chronicity, unspecified heart failure type ([facility]     ICD-10-CM: I50.9 ICD-9-CM: 428.0 Essential hypertension     ICD-10-CM: I10 
ICD-9-CM: 401.9 Dyspnea on exertion     ICD-10-CM: R06.09 
ICD-9-CM: 786.09 Stage 3 chronic kidney disease     ICD-10-CM: N18.3 ICD-9-CM: 289. 3 Vitals BP Pulse Resp Height(growth percentile) Weight(growth percentile) SpO2  
 124/78 (BP 1 Location: Left arm, BP Patient Position: Sitting) 65 18 5' 7\" (1.702 m) 287 lb 6.4 oz (130.4 kg) 96% BMI Smoking Status 45.01 kg/m2 Never Smoker Vitals History BMI and BSA Data Body Mass Index Body Surface Area 45.01 kg/m 2 2.48 m 2 Preferred Pharmacy Pharmacy Name Phone [facility] [address] [phone] Your Updated Medication List  
  
   
This list is accurate as of 6/27/18 11:59 PM.  Always use your most recent med list.  
  
  
  
  
 ABILIFY 5 mg tablet Generic drug:  ARIPiprazole Take 5 mg by mouth daily. aspirin delayed-release 81 mg tablet Take 81 mg by mouth daily. atorvastatin 20 mg tablet Commonly known as:  LIPITOR Take 1 Tab by mouth daily. carvedilol 25 mg tablet Commonly known as:  COREG  
TAKE ONE TABLET BY MOUTH TWICE DAILY WITH MEALS  
  
 colchicine 0.6 mg tablet Take 1 Tab by mouth daily. COMPRESSION STOCKINGS  
by Does Not Apply route. Wear while awake. Remove at HS.  
  
 furosemide 20 mg tablet Commonly known as:  LASIX Take 1 Tab by mouth two (2) times a day. hydrALAZINE 25 mg tablet Commonly known as:  APRESOLINE  
TAKE ONE TABLET BY MOUTH THREE TIMES DAILY  
  
 KLOR-CON M10 10 mEq tablet Generic drug:  potassium chloride TAKE TWO TABLETS BY MOUTH ONCE DAILY LORazepam 1 mg tablet Commonly known as:  ATIVAN Take 1 Tab by mouth every eight (8) hours as needed. Max Daily Amount: 3 mg. Indications: ANXIETY, INSOMNIA  
  
 losartan 100 mg tablet Commonly known as:  COZAAR  
TAKE ONE TABLET BY MOUTH ONCE DAILY  
  
 NIFEdipine ER 60 mg ER tablet Commonly known as:  PROCARDIA XL  
TAKE ONE TABLET BY MOUTH ONCE DAILY PROzac 10 mg capsule Generic drug:  FLUoxetine Take  by mouth daily. rosuvastatin 10 mg tablet Commonly known as:  CRESTOR Take 1 Tab by mouth nightly. spironolactone 25 mg tablet Commonly known as:  ALDACTONE  
TAKE ONE-HALF TABLET BY MOUTH ONCE DAILY We Performed the Following METABOLIC PANEL, COMPREHENSIVE [99661 CPT(R)] NT-PRO BNP [id number] CPT(R)] Follow-up Instructions Return in about 4 weeks (around 7/25/2018). To-Do List   
 06/27/2018 Echocardiography:  ECHO ADULT FOLLOW-UP OR LIMITED   
  
 06/27/2018 Lab:  METABOLIC PANEL, COMPREHENSIVE   
  
 06/27/2018 Lab:  NT-PRO BNP   
  
 06/27/2018 Imaging:  XR CHEST PA LAT Introducing [facility] & HEALTH SERVICES! [employer] introduces [employer] patient portal. Now you can access parts of your medical record, email your doctor's office, and request medication refills online. 1. In your internet browser, go to [email or web address] 2. Click on the First Time User? Click Here link in the Sign In box. You will see the New Member Sign Up page. 3. Enter your [employer] Access Code exactly as it appears below. You will not need to use this code after youve completed the sign-up process. If you do not sign up before the expiration date, you must request a new code. · [employer] Access Code: [email or web address] Expires: 11/29/2018  9:48 AM 
 
4. Enter the last four digits of your Social Security Number (xxxx) and Date of Birth (mm/dd/yyyy) as indicated and click Submit. You will be taken to the next sign-up page. 5. Create a [employer] ID. This will be your [employer] login ID and cannot be changed, so think of one that is secure and easy to remember. 6. Create a [employer] password. You can change your password at any time. 7. Enter your Password Reset Question and Answer. This can be used at a later time if you forget your password. 8. Enter your e-mail address. You will receive e-mail notification when new information is available in [address] 9. Click Sign Up. You can now view and download portions of your medical record. 10. Click the Download Summary menu link to download a portable copy of your medical information. If you have questions, please visit the Frequently Asked Questions section of the [employer] website. Remember, [employer] is NOT to be used for urgent needs. For medical emergencies, dial 911. Now available from your iPhone and Android! Please provide this summary of care documentation to your next provider. Your primary care clinician is listed as Shawna Landry. If you have any questions after today's visit, please call [phone].

## 2018-06-27 NOTE — PROGRESS NOTES
HISTORY OF PRESENT ILLNESS  Feng Galvez is a 52 y.o. male here for evaluation of weight gain. Patient states that he has gained 5 pounds in 1 week. He is concerned because he has a history of heart failure. Last 2D echocardiogram done in September 2017 showed an ejection fraction of 59% with mild diastolic dysfunction. Patient does not note edema but states he has been wearing compression hose. He denies orthopnea or shortness of breath. He does admit to dyspnea on exertion. He states that he does swim 3 times a week without shortness of breath but does not swim the entire length of the pool. Hypertension    The history is provided by the patient and medical records. This is a chronic problem. The problem has been gradually improving. Pertinent negatives include no orthopnea, no peripheral edema and no dizziness. There are no associated agents to hypertension. Risk factors include dyslipidemia, obesity and male gender. Chronic Kidney Disease   The history is provided by the patient and medical records. This is a chronic problem. The problem has been gradually worsening. Pertinent negatives include no abdominal pain. Nothing aggravates the symptoms. Nothing relieves the symptoms. He has tried nothing for the symptoms. Morbid Obesity   The history is provided by the medical records. This is a chronic problem. The problem has been gradually improving. Pertinent negatives include no abdominal pain. The symptoms are aggravated by eating. Nothing relieves the symptoms. He has tried nothing for the symptoms. CHF   The history is provided by the patient and medical records. This is a chronic problem. The problem has been gradually improving. Pertinent negatives include no abdominal pain. Nothing aggravates the symptoms. The symptoms are relieved by medications. Treatments tried: Lasix Spironolactone and losartan. The treatment provided significant relief.    Breathing Problem   The history is provided by the patient and medical records. This is a chronic problem. The problem has not changed since onset. Pertinent negatives include no fever, no rhinorrhea, no cough, no sputum production, no wheezing, no orthopnea, no abdominal pain, no rash, no leg pain, no leg swelling and no claudication. It is unknown what precipitated the problem. He has tried nothing for the symptoms. He has had prior hospitalizations. He has had prior ED visits. Associated medical issues do not include asthma, COPD or chronic lung disease. Allergies   Allergen Reactions    Pcn [Penicillins] Anaphylaxis and Angioedema    Coconut Swelling     Current Outpatient Prescriptions on File Prior to Visit   Medication Sig Dispense Refill    NIFEdipine ER (PROCARDIA XL) 60 mg ER tablet TAKE ONE TABLET BY MOUTH ONCE DAILY 90 Tab 1    FLUoxetine (PROZAC) 10 mg capsule Take  by mouth daily.  furosemide (LASIX) 20 mg tablet Take 1 Tab by mouth two (2) times a day. 180 Tab 1    carvedilol (COREG) 25 mg tablet TAKE ONE TABLET BY MOUTH TWICE DAILY WITH MEALS 90 Tab 1    losartan (COZAAR) 100 mg tablet TAKE ONE TABLET BY MOUTH ONCE DAILY 30 Tab 6    KLOR-CON M10 10 mEq tablet TAKE TWO TABLETS BY MOUTH ONCE DAILY 60 Tab 3    colchicine 0.6 mg tablet Take 1 Tab by mouth daily. 90 Tab 0    spironolactone (ALDACTONE) 25 mg tablet TAKE ONE-HALF TABLET BY MOUTH ONCE DAILY 30 Tab 6    hydrALAZINE (APRESOLINE) 25 mg tablet TAKE ONE TABLET BY MOUTH THREE TIMES DAILY 90 Tab 3    rosuvastatin (CRESTOR) 10 mg tablet Take 1 Tab by mouth nightly. 30 Tab 6    atorvastatin (LIPITOR) 20 mg tablet Take 1 Tab by mouth daily. 30 Tab 0    MISCELLANEOUS MEDICAL SUPPLY (COMPRESSION STOCKINGS) by Does Not Apply route. Wear while awake. Remove at HS.  LORazepam (ATIVAN) 1 mg tablet Take 1 Tab by mouth every eight (8) hours as needed. Max Daily Amount: 3 mg. Indications: ANXIETY, INSOMNIA 90 Tab 0    ARIPiprazole (ABILIFY) 5 mg tablet Take 5 mg by mouth daily.  aspirin delayed-release 81 mg tablet Take 81 mg by mouth daily. No current facility-administered medications on file prior to visit. 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 ([facility]     Cardiomyopathy ([facility]     CHF (congestive heart failure) (HCC)     CVA (cerebral infarction)     Depression     ED (erectile dysfunction)     Gastritis     Gout     HTN (hypertension)     Hyperlipemia, mixed     Hypoxia     Kidney disease, chronic, stage III (moderate, EGFR 30-59 ml/min)     Pre-diabetes     Sleep apnea     Vitamin D deficiency      Past Surgical History:   Procedure Laterality Date    HX ACL RECONSTRUCTION      HX APPENDECTOMY  2004    became septic from a rupture     Family History   Problem Relation Age of Onset    No Known Problems Mother     Hypertension Father     Diabetes Father     Cancer Father      prostate    No Known Problems Sister     Hypertension Maternal Grandmother     No Known Problems Maternal Grandfather     Alcohol abuse Paternal Grandmother     Bipolar Disorder Paternal Grandmother     No Known Problems Paternal Grandfather      Social History     Social History    Marital status:      Spouse name: N/A    Number of children: N/A    Years of education: N/A     Occupational History    Not on file.      Social History Main Topics    Smoking status: Never Smoker    Smokeless tobacco: Never Used    Alcohol use No      Comment: holidays/ occasions    Drug use: No    Sexual activity: Yes     Partners: Female     Other Topics Concern     Service Yes     [employer]    Blood Transfusions No    Caffeine Concern No    Occupational Exposure No    Hobby Hazards No    Sleep Concern No     sleep apnea, has cpap    Stress Concern Yes     family related    Weight Concern No     Pt has lost 20lbs but is wanting to lose more    Special Diet Yes     no salt    Back Care No    Exercise No    Bike Helmet No    Seat Belt Yes    Self-Exams Yes     Social History Narrative       Review of Systems   Constitutional: Negative. Negative for fever. HENT: Negative for rhinorrhea. Eyes: Negative. Respiratory: Negative. Negative for cough, sputum production and wheezing. Cardiovascular: Negative. Negative for orthopnea, claudication and leg swelling. Gastrointestinal: Negative for abdominal pain. Musculoskeletal: Negative. Skin: Negative for rash. Neurological: Negative. Negative for dizziness. Endo/Heme/Allergies: Negative. Psychiatric/Behavioral: Negative. Visit Vitals    /78 (BP 1 Location: Left arm, BP Patient Position: Sitting)    Pulse 65    Resp 18    Ht 5' 7\" (1.702 m)    Wt 287 lb 6.4 oz (130.4 kg)    SpO2 96%    BMI 45.01 kg/m2       Physical Exam   Constitutional: He is oriented to person, place, and time. He appears well-developed and well-nourished. HENT:   Head: Normocephalic and atraumatic. Cardiovascular: Normal rate, regular rhythm, normal heart sounds and intact distal pulses. Exam reveals no gallop and no friction rub. No murmur heard. Pulmonary/Chest: Effort normal and breath sounds normal. No respiratory distress. He has no wheezes. He has no rales. Musculoskeletal: Normal range of motion. He exhibits no edema or tenderness. Neurological: He is alert and oriented to person, place, and time. No cranial nerve deficit. Coordination normal.   Skin: Skin is warm and dry. No rash noted. No erythema. No pallor. Psychiatric: He has a normal mood and affect. His behavior is normal. Thought content normal.   Nursing note and vitals reviewed. ASSESSMENT and PLAN    ICD-10-CM ICD-9-CM    1. Obesity, morbid ([facility] E66.01 278.01    2. Congestive heart failure, unspecified HF chronicity, unspecified heart failure type (HCC) I50.9 428.0 ECHO ADULT FOLLOW-UP OR LIMITED      XR CHEST PA LAT   3. Essential hypertension I10 401.9 ECHO ADULT FOLLOW-UP OR LIMITED   4. Dyspnea on exertion R06.09 786.09 NT-PRO BNP      XR CHEST PA LAT   5. Stage 3 chronic kidney disease G37.6 331.2 METABOLIC PANEL, COMPREHENSIVE     Follow-up Disposition:  Return in about 4 weeks (around 7/25/2018).

## 2018-06-27 NOTE — PROGRESS NOTES
Janice Solo is a 52 y.o. male presents in office for    Chief Complaint   Patient presents with    CHF     6 weeks f/u    Hypertension    Chronic Kidney Disease        Health Maintenance Due   Topic Date Due    Hepatitis C Screening  1965    DTaP/Tdap/Td series (1 - Tdap) 05/01/1984    Pneumococcal 19-64 Highest Risk (2 of 3 - PCV13) 05/13/2013    FOBT Q 1 YEAR AGE 50-75  05/23/2018       1. Have you been to the ER, urgent care clinic since your last visit? Hospitalized since your last visit? No    2. Have you seen or consulted any other health care providers outside of the [address] [place] [address] since your last visit? Include any pap smears or colon screening. No    Patient states he is experiencing pain in the LLQ of abdomen, 7/10 pain. States the pain started on 6/24/18. Currently not taking prn pain medications.

## 2018-06-28 LAB
A-G RATIO,AGRAT: 1.4 RATIO (ref 1.1–2.6)
ALBUMIN SERPL-MCNC: 4.5 G/DL (ref 3.5–5)
ALP SERPL-CCNC: 89 U/L (ref 25–115)
ALT SERPL-CCNC: 23 U/L (ref 5–40)
ANION GAP SERPL CALC-SCNC: 19 MMOL/L
AST SERPL W P-5'-P-CCNC: 17 U/L (ref 10–37)
BILIRUB SERPL-MCNC: 0.3 MG/DL (ref 0.2–1.2)
BNP SERPL-MCNC: 118 PG/ML
BUN SERPL-MCNC: 31 MG/DL (ref 6–22)
CALCIUM SERPL-MCNC: 8.8 MG/DL (ref 8.4–10.4)
CHLORIDE SERPL-SCNC: 100 MMOL/L (ref 98–110)
CO2 SERPL-SCNC: 22 MMOL/L (ref 20–32)
CREAT SERPL-MCNC: 1.9 MG/DL (ref 0.5–1.2)
GFRAA, 66117: 44.3
GFRNA, 66118: 36.5
GLOBULIN,GLOB: 3.3 G/DL (ref 2–4)
GLUCOSE SERPL-MCNC: 90 MG/DL (ref 70–99)
POTASSIUM SERPL-SCNC: 4.4 MMOL/L (ref 3.5–5.5)
PROT SERPL-MCNC: 7.8 G/DL (ref 6.4–8.3)
SODIUM SERPL-SCNC: 141 MMOL/L (ref 133–145)

## 2018-06-28 NOTE — PROGRESS NOTES
Kidney function remains unchanged there is no evidence for congestive heart failure patient's weight gain most likely is secondary to increase calories.

## 2018-06-29 ENCOUNTER — TELEPHONE (OUTPATIENT)
Dept: FAMILY MEDICINE CLINIC | Age: 53
End: 2018-06-29

## 2018-06-29 NOTE — TELEPHONE ENCOUNTER
----- Message from Glen Vieira MD sent at 6/28/2018  1:26 PM EDT -----  Kidney function remains unchanged there is no evidence for congestive heart failure patient's weight gain most likely is secondary to increase calories.

## 2018-07-09 NOTE — TELEPHONE ENCOUNTER
Pt calling to request medication refill of:  Requested Prescriptions     Pending Prescriptions Disp Refills    rosuvastatin (CRESTOR) 10 mg tablet 30 Tab 6     Sig: Take 1 Tab by mouth nightly. be sent to [address]  Pt has about 0 tabs remaining. Pts last appt was 6/27  Advised pt of 72 hour time frame for refill requests. Please advise.     Please have another provider refill

## 2018-07-10 RX ORDER — ROSUVASTATIN CALCIUM 10 MG/1
10 TABLET, COATED ORAL
Qty: 30 TAB | Refills: 6 | OUTPATIENT
Start: 2018-07-10

## 2018-07-10 RX ORDER — ROSUVASTATIN CALCIUM 10 MG/1
TABLET, COATED ORAL
Qty: 30 TAB | Refills: 6 | Status: SHIPPED | OUTPATIENT
Start: 2018-07-10 | End: 2018-07-18 | Stop reason: SDUPTHER

## 2018-07-10 NOTE — TELEPHONE ENCOUNTER
Dr. Raj Chan, please see refill request for patient, thank you! Requested Prescriptions     Pending Prescriptions Disp Refills    rosuvastatin (CRESTOR) 10 mg tablet 30 Tab 6     Sig: Take 1 Tab by mouth nightly.

## 2018-07-18 RX ORDER — ROSUVASTATIN CALCIUM 10 MG/1
10 TABLET, COATED ORAL
Qty: 90 TAB | Refills: 1 | Status: SHIPPED | OUTPATIENT
Start: 2018-07-18 | End: 2020-06-03 | Stop reason: SDUPTHER

## 2018-08-06 NOTE — PROGRESS NOTES
HISTORY OF PRESENT ILLNESS  Landry Herndon is a 46 y.o. male here for follow-up of hypertension CHF and rash. Patient shaves his head and felt a rash on the top of his head which occurred 4-5 days ago. He states he has not been able to initiate diet because of recent death in the family. He has not checked his blood pressures at home. Hypertension    The history is provided by the patient and medical records. This is a chronic problem. The problem has been gradually improving. Pertinent negatives include no chest pain, no headaches, no peripheral edema, no dizziness and no shortness of breath. There are no associated agents to hypertension. Risk factors include diabetes mellitus, dyslipidemia, obesity and male gender. Rash    The history is provided by the patient. This is a new problem. The current episode started more than 2 days ago. The problem has been gradually improving. Associated with: Shaving head. There has been no fever. The rash is present on the scalp. The patient is experiencing no pain. He has tried nothing for the symptoms. Allergies   Allergen Reactions    Pcn [Penicillins] Anaphylaxis and Angioedema    Coconut Swelling     Current Outpatient Prescriptions on File Prior to Visit   Medication Sig Dispense Refill    NIFEdipine ER (PROCARDIA XL) 60 mg ER tablet TAKE ONE TABLET BY MOUTH ONCE DAILY 30 Tab 0    atorvastatin (LIPITOR) 20 mg tablet TAKE ONE TABLET BY MOUTH ONCE DAILY 30 Tab 6    ramipril (ALTACE) 10 mg capsule Take 1 Cap by mouth daily. 30 Cap 1    carvedilol (COREG) 25 mg tablet TAKE ONE TABLET BY MOUTH TWICE DAILY WITH  MEALS 180 Tab 1    febuxostat (ULORIC) 40 mg tab tablet Take 2 Tabs by mouth daily.  90 Tab 3    hydrALAZINE (APRESOLINE) 25 mg tablet TAKE ONE TABLET BY MOUTH THREE TIMES DAILY 90 Tab 3    furosemide (LASIX) 40 mg tablet Take two tabs twice daily  Indications: EDEMA 120 Tab 6    diclofenac (VOLTAREN) 1 % gel Apply 4 g to affected area four (4) times daily. 100 g 0    MISCELLANEOUS MEDICAL SUPPLY (COMPRESSION STOCKINGS) by Does Not Apply route. Wear while awake. Remove at HS.  LORazepam (ATIVAN) 1 mg tablet Take 1 Tab by mouth every eight (8) hours as needed. Max Daily Amount: 3 mg. Indications: ANXIETY, INSOMNIA 90 Tab 0    ARIPiprazole (ABILIFY) 5 mg tablet Take  by mouth daily.  aspirin delayed-release 81 mg tablet Take 81 mg by mouth daily.  lithium carbonate 300 mg tablet Take 300 mg by mouth daily.  spironolactone (ALDACTONE) 25 mg tablet Take one half tab daily 30 Tab 6    potassium chloride (KLOR-CON M10) 10 mEq tablet TAKE TWO TABLETS BY MOUTH ONCE DAILY 60 Tab 3     No current facility-administered medications on file prior to visit. Past Medical History   Diagnosis Date    Anemia     Bipolar II disorder ([address]     Cardiomyopathy ([address] [address]     CHF (congestive heart failure) (HCC)     CVA (cerebral infarction)     Depression     ED (erectile dysfunction)     Gastritis     Gout     HTN (hypertension)     Hyperlipemia, mixed     Hypoxia     Kidney disease, chronic, stage III (moderate, EGFR 30-59 ml/min)     Pre-diabetes     Sleep apnea     Vitamin D deficiency      Past Surgical History   Procedure Laterality Date    Hx appendectomy  2004     became septic from a rupture    Hx acl reconstruction       Family History   Problem Relation Age of Onset    No Known Problems Mother     Hypertension Father     Diabetes Father     Cancer Father      prostate    No Known Problems Sister     Hypertension Maternal Grandmother     No Known Problems Maternal Grandfather     Alcohol abuse Paternal Grandmother     Bipolar Disorder Paternal Grandmother     No Known Problems Paternal Grandfather      Social History     Social History    Marital status:      Spouse name: N/A    Number of children: N/A    Years of education: N/A     Occupational History    Not on file.      Social History Main Topics    Smoking status: Never Smoker    Smokeless tobacco: Never Used    Alcohol use No      Comment: holidays/ occasions    Drug use: No    Sexual activity: Yes     Partners: Female     Other Topics Concern     Service Yes     [employer]    Blood Transfusions No    Caffeine Concern No    Occupational Exposure No    Hobby Hazards No    Sleep Concern No     sleep apnea, has cpap    Stress Concern Yes     family related    Weight Concern No     Pt has lost 20lbs but is wanting to lose more    Special Diet Yes     no salt    Back Care No    Exercise No    Bike Helmet No    Seat Belt Yes    Self-Exams Yes     Social History Narrative         Review of Systems   Constitutional: Negative. Respiratory: Negative. Negative for shortness of breath. Cardiovascular: Negative. Negative for chest pain. Musculoskeletal: Negative. Skin: Positive for rash. Neurological: Negative. Negative for dizziness and headaches. Endo/Heme/Allergies: Negative. Psychiatric/Behavioral: Negative. Visit Vitals    /80 (BP 1 Location: Right arm, BP Patient Position: Sitting)    Pulse 82    Temp 97.6 °F (36.4 °C) (Oral)    Resp 16    Ht 5' 7\" (1.702 m)    Wt 272 lb (123.4 kg)    SpO2 97%    BMI 42.6 kg/m2       Physical Exam   Constitutional: He is oriented to person, place, and time. He appears well-developed and well-nourished. HENT:   Head: Normocephalic and atraumatic. Cardiovascular: Normal rate, regular rhythm, normal heart sounds and intact distal pulses. Exam reveals no gallop and no friction rub. No murmur heard. Pulmonary/Chest: Effort normal and breath sounds normal. No respiratory distress. He has no wheezes. He has no rales. Musculoskeletal: He exhibits no edema. Neurological: He is alert and oriented to person, place, and time. No cranial nerve deficit. Psychiatric: He has a normal mood and affect.  His behavior is normal. Judgment and thought content normal.   Nursing note and vitals reviewed. ASSESSMENT and PLAN    ICD-10-CM ICD-9-CM    1. Essential hypertension I10 401.9    2. Chronic diastolic congestive heart failure (HCC) I50.32 428.32      428.0    3. Pre-diabetes R73.03 790.29      Follow-up Disposition:  Return in about 2 months (around 3/18/2017).   current treatment plan is effective, no change in therapy family/other relative unknown

## 2018-08-28 DIAGNOSIS — I50.33 ACUTE ON CHRONIC DIASTOLIC CONGESTIVE HEART FAILURE (HCC): ICD-10-CM

## 2018-08-28 DIAGNOSIS — N18.30 CKD (CHRONIC KIDNEY DISEASE) STAGE 3, GFR 30-59 ML/MIN (HCC): ICD-10-CM

## 2018-08-28 RX ORDER — HYDRALAZINE HYDROCHLORIDE 25 MG/1
TABLET, FILM COATED ORAL
Qty: 90 TAB | Refills: 1 | Status: SHIPPED | OUTPATIENT
Start: 2018-08-28 | End: 2018-12-19 | Stop reason: SDUPTHER

## 2018-08-28 RX ORDER — FUROSEMIDE 20 MG/1
20 TABLET ORAL 2 TIMES DAILY
Qty: 180 TAB | Refills: 1 | Status: SHIPPED | OUTPATIENT
Start: 2018-08-28 | End: 2018-10-24

## 2018-08-28 RX ORDER — POTASSIUM CHLORIDE 750 MG/1
TABLET, EXTENDED RELEASE ORAL
Qty: 180 TAB | Refills: 1 | Status: SHIPPED | OUTPATIENT
Start: 2018-08-28 | End: 2018-10-24

## 2018-08-28 RX ORDER — CARVEDILOL 25 MG/1
TABLET ORAL
Qty: 90 TAB | Refills: 1 | Status: SHIPPED | OUTPATIENT
Start: 2018-08-28 | End: 2018-09-10 | Stop reason: SDUPTHER

## 2018-08-28 NOTE — TELEPHONE ENCOUNTER
Pt calling to request medication refill of:  Requested Prescriptions     Pending Prescriptions Disp Refills    furosemide (LASIX) 20 mg tablet 180 Tab 1     Sig: Take 1 Tab by mouth two (2) times a day.  potassium chloride (KLOR-CON M10) 10 mEq tablet 60 Tab 3    carvedilol (COREG) 25 mg tablet 90 Tab 1     Sig: TAKE ONE TABLET BY MOUTH TWICE DAILY WITH MEALS    hydrALAZINE (APRESOLINE) 25 mg tablet 90 Tab 3          [facility]  Pt has about 7 tabs remaining. Pts last appt was 6/27  Advised pt of 72 hour time frame for refill requests. Please advise.     Pt was made aware that their provider is not in the office    Please have another provider refill

## 2018-08-28 NOTE — TELEPHONE ENCOUNTER
Dr. Jackelyn Flowers, please see refill request for patient of Dr. Sapna Parker, thank you! Requested Prescriptions     Pending Prescriptions Disp Refills    furosemide (LASIX) 20 mg tablet 180 Tab 1     Sig: Take 1 Tab by mouth two (2) times a day.     potassium chloride (KLOR-CON M10) 10 mEq tablet 180 Tab 1     Sig: TAKE TWO TABLETS BY MOUTH ONCE DAILY    carvedilol (COREG) 25 mg tablet 90 Tab 1     Sig: TAKE ONE TABLET BY MOUTH TWICE DAILY WITH MEALS    hydrALAZINE (APRESOLINE) 25 mg tablet 90 Tab 1     Sig: TAKE ONE TABLET BY MOUTH THREE TIMES DAILY

## 2018-08-29 ENCOUNTER — PATIENT OUTREACH (OUTPATIENT)
Dept: FAMILY MEDICINE CLINIC | Age: 53
End: 2018-08-29

## 2018-08-29 ENCOUNTER — TELEPHONE (OUTPATIENT)
Dept: FAMILY MEDICINE CLINIC | Age: 53
End: 2018-08-29

## 2018-08-29 DIAGNOSIS — Z12.11 ENCOUNTER FOR FIT (FECAL IMMUNOCHEMICAL TEST) SCREENING: Primary | ICD-10-CM

## 2018-09-08 ENCOUNTER — DOCUMENTATION ONLY (OUTPATIENT)
Dept: FAMILY MEDICINE CLINIC | Age: 53
End: 2018-09-08

## 2018-09-08 NOTE — PROGRESS NOTES
Received visit summary from Brooke Mondragon MD, [facility]). Forwarded to physician for review.

## 2018-09-10 NOTE — TELEPHONE ENCOUNTER
Requested Prescriptions     Pending Prescriptions Disp Refills    carvedilol (COREG) 25 mg tablet 90 Tab 1     Sig: TAKE ONE TABLET BY MOUTH TWICE DAILY WITH MEALS     [address] [phone]    They have 5 tablets remaining. Pts last appt was 6/27/18, no future appointment is scheduled. Pt aware of 72 hours time frame.

## 2018-09-17 ENCOUNTER — TELEPHONE (OUTPATIENT)
Dept: FAMILY MEDICINE CLINIC | Age: 53
End: 2018-09-17

## 2018-09-18 RX ORDER — CARVEDILOL 25 MG/1
TABLET ORAL
Qty: 90 TAB | Refills: 1 | Status: SHIPPED | OUTPATIENT
Start: 2018-09-18 | End: 2018-10-17 | Stop reason: SDUPTHER

## 2018-09-20 RX ORDER — CARVEDILOL 25 MG/1
25 TABLET ORAL 2 TIMES DAILY WITH MEALS
Qty: 60 TAB | Refills: 6 | Status: SHIPPED | OUTPATIENT
Start: 2018-09-20 | End: 2019-03-18 | Stop reason: SDUPTHER

## 2018-10-11 ENCOUNTER — PATIENT OUTREACH (OUTPATIENT)
Dept: FAMILY MEDICINE CLINIC | Age: 53
End: 2018-10-11

## 2018-10-17 ENCOUNTER — OFFICE VISIT (OUTPATIENT)
Dept: FAMILY MEDICINE CLINIC | Age: 53
End: 2018-10-17

## 2018-10-17 VITALS
BODY MASS INDEX: 44.57 KG/M2 | HEART RATE: 72 BPM | TEMPERATURE: 99 F | RESPIRATION RATE: 16 BRPM | HEIGHT: 67 IN | OXYGEN SATURATION: 90 % | SYSTOLIC BLOOD PRESSURE: 130 MMHG | DIASTOLIC BLOOD PRESSURE: 62 MMHG | WEIGHT: 284 LBS

## 2018-10-17 DIAGNOSIS — J20.9 ACUTE BRONCHITIS, UNSPECIFIED ORGANISM: ICD-10-CM

## 2018-10-17 DIAGNOSIS — R05.9 COUGH: ICD-10-CM

## 2018-10-17 DIAGNOSIS — R05.9 COUGH: Primary | ICD-10-CM

## 2018-10-17 RX ORDER — AZITHROMYCIN 250 MG/1
TABLET, FILM COATED ORAL
Qty: 6 TAB | Refills: 0 | Status: SHIPPED | OUTPATIENT
Start: 2018-10-17 | End: 2018-10-22

## 2018-10-24 ENCOUNTER — OFFICE VISIT (OUTPATIENT)
Dept: FAMILY MEDICINE CLINIC | Age: 53
End: 2018-10-24

## 2018-10-24 VITALS
BODY MASS INDEX: 43.6 KG/M2 | HEART RATE: 69 BPM | DIASTOLIC BLOOD PRESSURE: 66 MMHG | HEIGHT: 67 IN | TEMPERATURE: 98.5 F | OXYGEN SATURATION: 96 % | SYSTOLIC BLOOD PRESSURE: 120 MMHG | WEIGHT: 277.8 LBS | RESPIRATION RATE: 16 BRPM

## 2018-10-24 DIAGNOSIS — I50.9 CONGESTIVE HEART FAILURE, UNSPECIFIED HF CHRONICITY, UNSPECIFIED HEART FAILURE TYPE (HCC): ICD-10-CM

## 2018-10-24 DIAGNOSIS — M10.079 ACUTE IDIOPATHIC GOUT OF ANKLE, UNSPECIFIED LATERALITY: ICD-10-CM

## 2018-10-24 DIAGNOSIS — I10 ESSENTIAL HYPERTENSION: ICD-10-CM

## 2018-10-24 DIAGNOSIS — N18.30 STAGE 3 CHRONIC KIDNEY DISEASE (HCC): ICD-10-CM

## 2018-10-24 DIAGNOSIS — R05.9 COUGH: Primary | ICD-10-CM

## 2018-10-24 PROBLEM — J20.9 ACUTE BRONCHITIS: Status: RESOLVED | Noted: 2018-10-17 | Resolved: 2018-10-24

## 2018-10-24 RX ORDER — DOXYCYCLINE 100 MG/1
100 CAPSULE ORAL
COMMUNITY
Start: 2018-10-22 | End: 2018-10-26

## 2018-10-24 RX ORDER — TRAMADOL HYDROCHLORIDE 50 MG/1
50 TABLET ORAL
COMMUNITY
Start: 2018-10-22 | End: 2018-11-28

## 2018-10-24 NOTE — PROGRESS NOTES
Walter Baez is a 52 y.o. male (: 1965) presenting to address:    Chief Complaint   Patient presents with    Other     released from [facility] [place] 10/22 for pneumonia & gout   [facility] Follow Up         Medication list has been reviewed with Walter Baez and updated as of today's date     Patient has been asked if refills needed as of today's date in which they replied;  NO    Health Maintenance items with a due date reviewed with patient:  Health Maintenance Due   Topic Date Due    Hepatitis C Screening  1965    DTaP/Tdap/Td series (1 - Tdap) 1984    Shingrix Vaccine Age 50> (1 of 2) 2013    Pneumococcal 19-64 Highest Risk (2 of 3 - PCV13) 2013    FOBT Q 1 YEAR AGE 50-75  2018         Hearing/Vision:   No exam data present    Learning Assessment:     Learning Assessment 2015   PRIMARY LEARNER Patient   PRIMARY LANGUAGE ENGLISH   LEARNER PREFERENCE PRIMARY DEMONSTRATION     OTHER (COMMENT)   ANSWERED BY Patient   RELATIONSHIP SELF       Depression Screening:     PHQ over the last two weeks 2018   Little interest or pleasure in doing things Several days   Feeling down, depressed, irritable, or hopeless Several days   Total Score PHQ 2 2   Thoughts of being better off dead, or hurting yourself in some way -       Fall Risk Assessment:     Fall Risk Assessment, last 12 mths 10/11/2017   Able to walk? Yes   Fall in past 12 months? No       Abuse Screening:     Abuse Screening Questionnaire 2018   Do you ever feel afraid of your partner? N   Are you in a relationship with someone who physically or mentally threatens you? N   Is it safe for you to go home? Y       Coordination of Care Questionaire:   1. Have you been to the ER, urgent care clinic since your last visit? Hospitalized since your last visit?  YES  Recently discharged from [facility] [place] 10/22

## 2018-10-24 NOTE — PROGRESS NOTES
HISTORY OF PRESENT ILLNESS  Eugene Harper is a 52 y.o. male here for follow-up after being admitted into the hospital for new treatment of pneumonia and gout. [facility] Follow Up   The history is provided by the patient, spouse and medical records. Pertinent negatives include no chest pain, no abdominal pain, no headaches and no shortness of breath. Cough   The history is provided by the patient. This is a new problem. The current episode started more than 2 days ago. The problem occurs constantly. The problem has been gradually improving. Pertinent negatives include no chest pain, no abdominal pain, no headaches and no shortness of breath. Nothing aggravates the symptoms. Nothing relieves the symptoms. He has tried nothing (Doxycycline) for the symptoms. The treatment provided significant relief. Hypertension    The history is provided by the patient and medical records. This is a chronic problem. The problem has been gradually improving. Pertinent negatives include no chest pain, no headaches, no peripheral edema, no dizziness and no shortness of breath. There are no associated agents to hypertension. Risk factors include dyslipidemia, obesity and male gender. Chronic Kidney Disease   The history is provided by the patient and medical records. This is a chronic problem. The problem has been gradually worsening. Pertinent negatives include no chest pain, no abdominal pain, no headaches and no shortness of breath. Nothing aggravates the symptoms. Nothing relieves the symptoms. He has tried nothing for the symptoms. CHF   The history is provided by the patient and medical records. This is a chronic problem. The problem has been gradually improving. Pertinent negatives include no chest pain, no abdominal pain, no headaches and no shortness of breath. Nothing aggravates the symptoms. The symptoms are relieved by medications. Treatments tried: Lasix Spironolactone and losartan.  The treatment provided significant relief. Gout   The history is provided by the patient and medical records. This is a recurrent problem. The problem has been gradually improving. Pertinent negatives include no chest pain, no abdominal pain, no headaches and no shortness of breath. The symptoms are aggravated by eating. The symptoms are relieved by medications. Treatments tried: Colchicine. The treatment provided significant relief. Allergies   Allergen Reactions    Pcn [Penicillins] Anaphylaxis and Angioedema    Coconut Swelling     Current Outpatient Medications on File Prior to Visit   Medication Sig Dispense Refill    doxycycline (MONODOX) 100 mg capsule 100 mg.  traMADol (ULTRAM) 50 mg tablet 50 mg.      carvedilol (COREG) 25 mg tablet Take 1 Tab by mouth two (2) times daily (with meals). 60 Tab 6    hydrALAZINE (APRESOLINE) 25 mg tablet TAKE ONE TABLET BY MOUTH THREE TIMES DAILY 90 Tab 1    rosuvastatin (CRESTOR) 10 mg tablet Take 1 Tab by mouth nightly for 180 days. 90 Tab 1    NIFEdipine ER (PROCARDIA XL) 60 mg ER tablet TAKE ONE TABLET BY MOUTH ONCE DAILY 90 Tab 1    FLUoxetine (PROZAC) 10 mg capsule Take  by mouth daily.  losartan (COZAAR) 100 mg tablet TAKE ONE TABLET BY MOUTH ONCE DAILY 30 Tab 6    colchicine 0.6 mg tablet Take 1 Tab by mouth daily. 90 Tab 0    spironolactone (ALDACTONE) 25 mg tablet TAKE ONE-HALF TABLET BY MOUTH ONCE DAILY 30 Tab 6    atorvastatin (LIPITOR) 20 mg tablet Take 1 Tab by mouth daily. 30 Tab 0    MISCELLANEOUS MEDICAL SUPPLY (COMPRESSION STOCKINGS) by Does Not Apply route. Wear while awake. Remove at HS.  LORazepam (ATIVAN) 1 mg tablet Take 1 Tab by mouth every eight (8) hours as needed. Max Daily Amount: 3 mg. Indications: ANXIETY, INSOMNIA 90 Tab 0    ARIPiprazole (ABILIFY) 5 mg tablet Take 5 mg by mouth daily.  aspirin delayed-release 81 mg tablet Take 81 mg by mouth daily.        Current Facility-Administered Medications on File Prior to Visit Medication Dose Route Frequency Provider Last Rate Last Dose    [DISCONTINUED] GENERIC EXTERNAL MEDICATION     Provider, Generic External Data         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 ([facility]     Cardiomyopathy ([facility]     CHF (congestive heart failure) ([facility]     CVA (cerebral infarction)     Depression     ED (erectile dysfunction)     Gastritis     Gout     HTN (hypertension)     Hyperlipemia, mixed     Hypoxia     Kidney disease, chronic, stage III (moderate, EGFR 30-59 ml/min) (HCC)     Pre-diabetes     Sleep apnea     Vitamin D deficiency      Past Surgical History:   Procedure Laterality Date    HX ACL RECONSTRUCTION      HX APPENDECTOMY  2004    became septic from a rupture     Family History   Problem Relation Age of Onset    No Known Problems Mother     Hypertension Father     Diabetes Father     Cancer Father         prostate    No Known Problems Sister     Hypertension Maternal Grandmother     No Known Problems Maternal Grandfather     Alcohol abuse Paternal Grandmother     Bipolar Disorder Paternal Grandmother     No Known Problems Paternal Grandfather      Social History     Socioeconomic History    Marital status:      Spouse name: Not on file    Number of children: Not on file    Years of education: Not on file    Highest education level: Not on file   Social Needs    Financial resource strain: Not on file    Food insecurity - worry: Not on file    Food insecurity - inability: Not on file   [place] needs - medical: Not on file   [place] needs - non-medical: Not on file   Occupational History    Not on file   Tobacco Use    Smoking status: Never Smoker    Smokeless tobacco: Never Used   Substance and Sexual Activity    Alcohol use: No     Comment: holidays/ occasions    Drug use: No    Sexual activity: Yes     Partners: Female   Other Topics Concern     Service Yes     Comment:  [employer]    Blood Transfusions No    Caffeine Concern No    Occupational Exposure No    Hobby Hazards No    Sleep Concern No     Comment: sleep apnea, has cpap    Stress Concern Yes     Comment: family related    Weight Concern No     Comment: Pt has lost 20lbs but is wanting to lose more    Special Diet Yes     Comment: no salt    Back Care No    Exercise No    Bike Helmet No    Seat Belt Yes    Self-Exams Yes   Social History Narrative    Not on file         Review of Systems   Constitutional: Negative. Eyes: Negative. Respiratory: Negative. Negative for shortness of breath. Cardiovascular: Negative. Negative for chest pain. Gastrointestinal: Negative for abdominal pain. Musculoskeletal: Positive for gout. Neurological: Negative. Negative for dizziness and headaches. Endo/Heme/Allergies: Negative. Psychiatric/Behavioral: Negative. Visit Vitals  /66 (BP 1 Location: Right arm, BP Patient Position: Sitting)   Pulse 69   Temp 98.5 °F (36.9 °C) (Oral)   Resp 16   Ht 5' 7\" (1.702 m)   Wt 277 lb 12.8 oz (126 kg)   SpO2 96%   BMI 43.51 kg/m²       Physical Exam   Constitutional: He is oriented to person, place, and time. He appears well-developed and well-nourished. HENT:   Head: Normocephalic and atraumatic. Cardiovascular: Normal rate, regular rhythm, normal heart sounds and intact distal pulses. Exam reveals no gallop and no friction rub. No murmur heard. Pulmonary/Chest: Effort normal and breath sounds normal. No respiratory distress. He has no wheezes. He has no rales. Musculoskeletal: Normal range of motion. He exhibits no edema or tenderness. Neurological: He is alert and oriented to person, place, and time. No cranial nerve deficit. Coordination normal.   Skin: Skin is warm and dry. No rash noted. No erythema. No pallor. Psychiatric: He has a normal mood and affect. His behavior is normal. Thought content normal.   Nursing note and vitals reviewed.       ASSESSMENT and PLAN    ICD-10-CM ICD-9-CM    1. Cough R05 786.2 XR CHEST PA LAT   2. Stage 3 chronic kidney disease (HCC) J20.1 860.9 METABOLIC PANEL, BASIC   3. Congestive heart failure, unspecified HF chronicity, unspecified heart failure type ([facility] A74.2 116.7 METABOLIC PANEL, BASIC   4. Acute idiopathic gout of ankle, unspecified laterality G78.650 521.33 METABOLIC PANEL, BASIC   5. Essential hypertension I10 401.9      Follow-up Disposition:  Return in about 2 weeks (around 11/7/2018). Patient has been instructed to increase colchicine to twice daily until pain goes away not to exceed 2 weeks. He also has been instructed to contact cardiology to determine if he is a candidate for the use of Entresto and to see if he can discontinue Lasix.

## 2018-10-29 ENCOUNTER — TELEPHONE (OUTPATIENT)
Dept: FAMILY MEDICINE CLINIC | Age: 53
End: 2018-10-29

## 2018-10-29 NOTE — TELEPHONE ENCOUNTER
----- Message from Angelica Diaz MD sent at 10/25/2018  4:29 PM EDT -----  Chest x-ray is read as no active disease

## 2018-11-28 ENCOUNTER — OFFICE VISIT (OUTPATIENT)
Dept: FAMILY MEDICINE CLINIC | Age: 53
End: 2018-11-28

## 2018-11-28 VITALS
RESPIRATION RATE: 16 BRPM | TEMPERATURE: 99.3 F | SYSTOLIC BLOOD PRESSURE: 114 MMHG | HEART RATE: 70 BPM | DIASTOLIC BLOOD PRESSURE: 72 MMHG | BODY MASS INDEX: 45.04 KG/M2 | OXYGEN SATURATION: 96 % | WEIGHT: 287 LBS | HEIGHT: 67 IN

## 2018-11-28 DIAGNOSIS — I10 ESSENTIAL HYPERTENSION: ICD-10-CM

## 2018-11-28 DIAGNOSIS — N18.30 CKD (CHRONIC KIDNEY DISEASE) STAGE 3, GFR 30-59 ML/MIN (HCC): ICD-10-CM

## 2018-11-28 DIAGNOSIS — I50.9 CONGESTIVE HEART FAILURE, UNSPECIFIED HF CHRONICITY, UNSPECIFIED HEART FAILURE TYPE (HCC): Primary | ICD-10-CM

## 2018-11-28 DIAGNOSIS — M10.072 IDIOPATHIC GOUT OF LEFT ANKLE, UNSPECIFIED CHRONICITY: ICD-10-CM

## 2018-11-28 RX ORDER — LOSARTAN POTASSIUM 100 MG/1
100 TABLET ORAL DAILY
COMMUNITY
End: 2018-11-28

## 2018-11-28 NOTE — PROGRESS NOTES
HISTORY OF PRESENT ILLNESS  Eugene Kee is a 52 y.o. male for follow-up of acute gout CHF chronic kidney disease and hypertension. Gout has resolved. Hypertension    The history is provided by the patient and medical records. This is a chronic problem. The problem has been gradually improving. Pertinent negatives include no chest pain, no headaches, no peripheral edema, no dizziness and no shortness of breath. There are no associated agents to hypertension. Risk factors include dyslipidemia, obesity and male gender. Chronic Kidney Disease   The history is provided by the patient and medical records. This is a chronic problem. The problem has been gradually improving. Pertinent negatives include no chest pain, no abdominal pain, no headaches and no shortness of breath. Nothing aggravates the symptoms. Nothing relieves the symptoms. He has tried nothing for the symptoms. CHF   The history is provided by the patient and medical records. This is a chronic problem. The problem has been gradually improving. Pertinent negatives include no chest pain, no abdominal pain, no headaches and no shortness of breath. Nothing aggravates the symptoms. The symptoms are relieved by medications. Treatments tried: Spironolactone and Coreg and hydralazine. The treatment provided significant relief. Gout   The history is provided by the patient and medical records. This is a recurrent problem. The problem has been gradually improving. Pertinent negatives include no chest pain, no abdominal pain, no headaches and no shortness of breath. The symptoms are aggravated by eating. The symptoms are relieved by medications. Treatments tried: Colchicine. The treatment provided significant relief.      Allergies   Allergen Reactions    Pcn [Penicillins] Anaphylaxis and Angioedema    Coconut Swelling     Current Outpatient Medications on File Prior to Visit   Medication Sig Dispense Refill    carvedilol (COREG) 25 mg tablet Take 1 Tab by mouth two (2) times daily (with meals). 60 Tab 6    hydrALAZINE (APRESOLINE) 25 mg tablet TAKE ONE TABLET BY MOUTH THREE TIMES DAILY 90 Tab 1    rosuvastatin (CRESTOR) 10 mg tablet Take 1 Tab by mouth nightly for 180 days. 90 Tab 1    NIFEdipine ER (PROCARDIA XL) 60 mg ER tablet TAKE ONE TABLET BY MOUTH ONCE DAILY 90 Tab 1    FLUoxetine (PROZAC) 10 mg capsule Take  by mouth daily.  colchicine 0.6 mg tablet Take 1 Tab by mouth daily. 90 Tab 0    spironolactone (ALDACTONE) 25 mg tablet TAKE ONE-HALF TABLET BY MOUTH ONCE DAILY 30 Tab 6    atorvastatin (LIPITOR) 20 mg tablet Take 1 Tab by mouth daily. 30 Tab 0    MISCELLANEOUS MEDICAL SUPPLY (COMPRESSION STOCKINGS) by Does Not Apply route. Wear while awake. Remove at HS.  LORazepam (ATIVAN) 1 mg tablet Take 1 Tab by mouth every eight (8) hours as needed. Max Daily Amount: 3 mg. Indications: ANXIETY, INSOMNIA 90 Tab 0    aspirin delayed-release 81 mg tablet Take 81 mg by mouth daily. No current facility-administered medications on file prior to visit.       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     Cardiomyopathy ([facility]     [facility])     CVA (cerebral infarction)     Depression     ED (erectile dysfunction)     Gastritis     Gout     HTN (hypertension)     Hyperlipemia, mixed     Hypoxia     Kidney disease, chronic, stage III (moderate, EGFR 30-59 ml/min) ([facility])     Pre-diabetes     Sleep apnea     Vitamin D deficiency      Past Surgical History:   Procedure Laterality Date    HX ACL RECONSTRUCTION      HX APPENDECTOMY  2004    became septic from a rupture     Family History   Problem Relation Age of Onset    No Known Problems Mother     Hypertension Father     Diabetes Father     Cancer Father         prostate    No Known Problems Sister     Hypertension Maternal Grandmother     No Known Problems Maternal Grandfather     Alcohol abuse Paternal Grandmother     Bipolar Disorder Paternal Grandmother     No Known Problems Paternal Grandfather      Social History     Socioeconomic History    Marital status:      Spouse name: Not on file    Number of children: Not on file    Years of education: Not on file    Highest education level: Not on file   Social Needs    Financial resource strain: Not on file    Food insecurity - worry: Not on file    Food insecurity - inability: Not on file   [employer] needs - medical: Not on file   [employer] needs - non-medical: Not on file   Occupational History    Not on file   Tobacco Use    Smoking status: Never Smoker    Smokeless tobacco: Never Used   Substance and Sexual Activity    Alcohol use: No     Comment: holidays/ occasions    Drug use: No    Sexual activity: Yes     Partners: Female   Other Topics Concern     Service Yes     Comment: [employer]    Blood Transfusions No    Caffeine Concern No    Occupational Exposure No    Hobby Hazards No    Sleep Concern No     Comment: sleep apnea, has cpap    Stress Concern Yes     Comment: family related    Weight Concern No     Comment: Pt has lost 20lbs but is wanting to lose more    Special Diet Yes     Comment: no salt    Back Care No    Exercise No    Bike Helmet No    Seat Belt Yes    Self-Exams Yes   Social History Narrative    Not on file         Review of Systems   Constitutional: Negative. Eyes: Negative. Respiratory: Negative. Negative for shortness of breath. Cardiovascular: Negative. Negative for chest pain. Gastrointestinal: Negative for abdominal pain. Musculoskeletal: Positive for gout. Neurological: Negative. Negative for dizziness and headaches. Endo/Heme/Allergies: Negative. Psychiatric/Behavioral: Negative.       Visit Vitals  /72 (BP 1 Location: Right arm, BP Patient Position: Sitting)   Pulse 70   Temp 99.3 °F (37.4 °C) (Oral)   Resp 16   Ht 5' 7\" (1.702 m)   Wt 287 lb (130.2 kg)   SpO2 96%   BMI 44.95 kg/m²       Physical Exam   Constitutional: He is oriented to person, place, and time. He appears well-developed and well-nourished. HENT:   Head: Normocephalic and atraumatic. Cardiovascular: Normal rate, regular rhythm, normal heart sounds and intact distal pulses. Exam reveals no gallop and no friction rub. No murmur heard. Pulmonary/Chest: Effort normal and breath sounds normal. No respiratory distress. He has no wheezes. He has no rales. Musculoskeletal: Normal range of motion. He exhibits no edema or tenderness. Neurological: He is alert and oriented to person, place, and time. No cranial nerve deficit. Coordination normal.   Skin: Skin is warm and dry. No rash noted. No erythema. No pallor. Psychiatric: He has a normal mood and affect. His behavior is normal. Thought content normal.   Nursing note and vitals reviewed. ASSESSMENT and PLAN    ICD-10-CM ICD-9-CM    1. Congestive heart failure, unspecified HF chronicity, unspecified heart failure type ([facility] I50.9 428.0    2. Essential hypertension V43 851.1 METABOLIC PANEL, COMPREHENSIVE   3. CKD (chronic kidney disease) stage 3, GFR 30-59 ml/min ([facility]) D22.6 314.1 METABOLIC PANEL, COMPREHENSIVE   4. Idiopathic gout of left ankle, unspecified chronicity M10.072 274.00      Follow-up Disposition:  Return in about 3 months (around 2/28/2019).

## 2018-11-28 NOTE — PROGRESS NOTES
Lon Younger is a 52 y.o. male (: 1965) presenting to address:    Chief Complaint   Patient presents with    Hypertension     follow up    Chronic Kidney Disease     follow up    CHF     follow up    Gout     follow up       Vitals:    18 1417   BP: 114/72   Pulse: 70   Resp: 16   Temp: 99.3 °F (37.4 °C)   TempSrc: Oral   SpO2: 96%   Weight: 287 lb (130.2 kg)   Height: 5' 7\" (1.702 m)   PainSc:   0 - No pain       Hearing/Vision:   No exam data present    Learning Assessment:     Learning Assessment 2015   PRIMARY LEARNER Patient   PRIMARY LANGUAGE ENGLISH   LEARNER PREFERENCE PRIMARY DEMONSTRATION     OTHER (COMMENT)   ANSWERED BY Patient   RELATIONSHIP SELF     Depression Screening:     PHQ over the last two weeks 2018   Little interest or pleasure in doing things Several days   Feeling down, depressed, irritable, or hopeless Several days   Total Score PHQ 2 2   Thoughts of being better off dead, or hurting yourself in some way -     Fall Risk Assessment:     Fall Risk Assessment, last 12 mths 10/11/2017   Able to walk? Yes   Fall in past 12 months? No     Abuse Screening:     Abuse Screening Questionnaire 2018   Do you ever feel afraid of your partner? N   Are you in a relationship with someone who physically or mentally threatens you? N   Is it safe for you to go home? Y     Coordination of Care Questionaire:   1. Have you been to the ER, urgent care clinic since your last visit? Hospitalized since your last visit? NO    2. Have you seen or consulted any other health care providers outside of the [facility] since your last visit? Include any pap smears or colon screening.  YES, Cardiologist appt 3 weeks ago

## 2018-12-02 LAB
A-G RATIO,AGRAT: 1.5 RATIO (ref 1.1–2.6)
ABSOLUTE LYMPHOCYTE COUNT, 10803: 1.8 K/UL (ref 1–4.8)
ALBUMIN SERPL-MCNC: 4.1 G/DL (ref 3.5–5)
ALP SERPL-CCNC: 82 U/L (ref 25–115)
ALT SERPL-CCNC: 24 U/L (ref 5–40)
ANION GAP SERPL CALC-SCNC: 16 MMOL/L
AST SERPL W P-5'-P-CCNC: 21 U/L (ref 10–37)
BASOPHILS # BLD: 0 K/UL (ref 0–0.2)
BASOPHILS NFR BLD: 0 % (ref 0–2)
BILIRUB SERPL-MCNC: 0.2 MG/DL (ref 0.2–1.2)
BUN SERPL-MCNC: 22 MG/DL (ref 6–22)
CALCIUM SERPL-MCNC: 8.5 MG/DL (ref 8.4–10.4)
CHLORIDE SERPL-SCNC: 101 MMOL/L (ref 98–110)
CO2 SERPL-SCNC: 23 MMOL/L (ref 20–32)
CREAT SERPL-MCNC: 1.6 MG/DL (ref 0.5–1.2)
EOSINOPHIL # BLD: 0.2 K/UL (ref 0–0.5)
EOSINOPHIL NFR BLD: 2 % (ref 0–6)
ERYTHROCYTE [DISTWIDTH] IN BLOOD BY AUTOMATED COUNT: 14 % (ref 10–15.5)
GFRAA, 66117: 53.5
GFRNA, 66118: 44.1
GLOBULIN,GLOB: 2.8 G/DL (ref 2–4)
GLUCOSE SERPL-MCNC: 94 MG/DL (ref 70–99)
GRANULOCYTES,GRANS: 70 % (ref 40–75)
HCT VFR BLD AUTO: 36.8 % (ref 39.3–51.6)
HGB BLD-MCNC: 12 G/DL (ref 13.1–17.2)
LYMPHOCYTES, LYMLT: 19 % (ref 20–45)
MCH RBC QN AUTO: 32 PG (ref 26–34)
MCHC RBC AUTO-ENTMCNC: 33 G/DL (ref 31–36)
MCV RBC AUTO: 97 FL (ref 80–95)
MONOCYTES # BLD: 0.8 K/UL (ref 0.1–1)
MONOCYTES NFR BLD: 8 % (ref 3–12)
NEUTROPHILS # BLD AUTO: 6.5 K/UL (ref 1.8–7.7)
PLATELET # BLD AUTO: 284 K/UL (ref 140–440)
PMV BLD AUTO: 10.1 FL (ref 9–13)
POTASSIUM SERPL-SCNC: 4.9 MMOL/L (ref 3.5–5.5)
PROT SERPL-MCNC: 6.9 G/DL (ref 6.4–8.3)
RBC # BLD AUTO: 3.81 M/UL (ref 3.8–5.8)
SODIUM SERPL-SCNC: 140 MMOL/L (ref 133–145)
WBC # BLD AUTO: 9.3 K/UL (ref 4–11)

## 2018-12-04 ENCOUNTER — TELEPHONE (OUTPATIENT)
Dept: FAMILY MEDICINE CLINIC | Age: 53
End: 2018-12-04

## 2018-12-04 DIAGNOSIS — Z12.11 SCREENING FOR COLON CANCER: Primary | ICD-10-CM

## 2018-12-04 NOTE — TELEPHONE ENCOUNTER
Mr. Braulio Meehan denies having received fit kit in the mail. I've asked office staff to mail kit to him. I've described the envelop to ensure it is not being thrown out as advertisement solicitation. Reviewed collection/labeling again and placed new order.
[place]

## 2018-12-08 ENCOUNTER — TELEPHONE (OUTPATIENT)
Dept: FAMILY MEDICINE CLINIC | Age: 53
End: 2018-12-08

## 2018-12-08 NOTE — TELEPHONE ENCOUNTER
----- Message from Angelica Diaz MD sent at 12/3/2018  9:23 AM EST -----  Kidney function has improved slightly

## 2018-12-10 NOTE — TELEPHONE ENCOUNTER
Pt returned call and was made aware kidney function has slightly improved.  Pt had no further questions

## 2018-12-19 DIAGNOSIS — N18.30 CKD (CHRONIC KIDNEY DISEASE) STAGE 3, GFR 30-59 ML/MIN (HCC): ICD-10-CM

## 2018-12-19 NOTE — TELEPHONE ENCOUNTER
Requested Prescriptions     Pending Prescriptions Disp Refills    hydrALAZINE (APRESOLINE) 25 mg tablet 90 Tab 1     Sig: TAKE ONE TABLET BY MOUTH THREE TIMES [address] [place] [address], [address] [place] [address]. [phone]      They have 3 days worth of tablets remaining. Pts last appt was 12/1/18, No future appointment is scheduled. Pt aware of 72 hours time frame.

## 2018-12-20 RX ORDER — HYDRALAZINE HYDROCHLORIDE 25 MG/1
TABLET, FILM COATED ORAL
Qty: 90 TAB | Refills: 1 | Status: SHIPPED | OUTPATIENT
Start: 2018-12-20 | End: 2019-01-31 | Stop reason: SDUPTHER

## 2019-01-23 ENCOUNTER — PATIENT OUTREACH (OUTPATIENT)
Dept: FAMILY MEDICINE CLINIC | Age: 54
End: 2019-01-23

## 2019-01-31 DIAGNOSIS — N18.30 CKD (CHRONIC KIDNEY DISEASE) STAGE 3, GFR 30-59 ML/MIN (HCC): ICD-10-CM

## 2019-01-31 DIAGNOSIS — I10 ESSENTIAL HYPERTENSION: ICD-10-CM

## 2019-01-31 NOTE — TELEPHONE ENCOUNTER
Last appt: 12/1/18  Next appt: Not sched    Requested Prescriptions     Pending Prescriptions Disp Refills    hydrALAZINE (APRESOLINE) 25 mg tablet 90 Tab 1     Sig: TAKE ONE TABLET BY MOUTH THREE TIMES DAILY    spironolactone (ALDACTONE) 25 mg tablet 30 Tab 6

## 2019-02-02 RX ORDER — HYDRALAZINE HYDROCHLORIDE 25 MG/1
TABLET, FILM COATED ORAL
Qty: 90 TAB | Refills: 1 | Status: SHIPPED | OUTPATIENT
Start: 2019-02-02 | End: 2019-05-01 | Stop reason: SDUPTHER

## 2019-02-02 RX ORDER — SPIRONOLACTONE 25 MG/1
TABLET ORAL
Qty: 30 TAB | Refills: 6 | Status: SHIPPED | OUTPATIENT
Start: 2019-02-02 | End: 2020-02-06

## 2019-02-07 LAB — HEMOCCULT STL QL IA: NEGATIVE

## 2019-02-11 ENCOUNTER — PATIENT OUTREACH (OUTPATIENT)
Dept: FAMILY MEDICINE CLINIC | Age: 54
End: 2019-02-11

## 2019-02-11 ENCOUNTER — TELEPHONE (OUTPATIENT)
Dept: FAMILY MEDICINE CLINIC | Age: 54
End: 2019-02-11

## 2019-03-18 NOTE — TELEPHONE ENCOUNTER
Pt calling to request med refill of:  Requested Prescriptions     Pending Prescriptions Disp Refills    carvedilol (COREG) 25 mg tablet 60 Tab 6     Sig: Take 1 Tab by mouth two (2) times daily (with meals). Be sent to [facility]    Pt has 5 tabs remaining     Pts last appt was 11/28/19  & no f/u scheduled    Pt advised of 72 hour time frame. Please advise.

## 2019-03-25 RX ORDER — CARVEDILOL 25 MG/1
25 TABLET ORAL 2 TIMES DAILY WITH MEALS
Qty: 60 TAB | Refills: 6 | Status: SHIPPED | OUTPATIENT
Start: 2019-03-25 | End: 2019-06-15 | Stop reason: SDUPTHER

## 2019-05-01 DIAGNOSIS — N18.30 CKD (CHRONIC KIDNEY DISEASE) STAGE 3, GFR 30-59 ML/MIN (HCC): ICD-10-CM

## 2019-05-01 RX ORDER — HYDRALAZINE HYDROCHLORIDE 25 MG/1
TABLET, FILM COATED ORAL
Qty: 90 TAB | Refills: 1 | Status: SHIPPED | OUTPATIENT
Start: 2019-05-01 | End: 2019-06-28 | Stop reason: SDUPTHER

## 2019-06-12 ENCOUNTER — OFFICE VISIT (OUTPATIENT)
Dept: FAMILY MEDICINE CLINIC | Age: 54
End: 2019-06-12

## 2019-06-12 VITALS
BODY MASS INDEX: 41.88 KG/M2 | HEART RATE: 67 BPM | HEIGHT: 67 IN | TEMPERATURE: 96.8 F | RESPIRATION RATE: 16 BRPM | OXYGEN SATURATION: 92 % | DIASTOLIC BLOOD PRESSURE: 68 MMHG | SYSTOLIC BLOOD PRESSURE: 106 MMHG | WEIGHT: 266.8 LBS

## 2019-06-12 DIAGNOSIS — I50.9 CONGESTIVE HEART FAILURE, UNSPECIFIED HF CHRONICITY, UNSPECIFIED HEART FAILURE TYPE (HCC): ICD-10-CM

## 2019-06-12 DIAGNOSIS — E78.5 HYPERLIPIDEMIA, UNSPECIFIED HYPERLIPIDEMIA TYPE: ICD-10-CM

## 2019-06-12 DIAGNOSIS — N18.30 CKD (CHRONIC KIDNEY DISEASE) STAGE 3, GFR 30-59 ML/MIN (HCC): Primary | ICD-10-CM

## 2019-06-12 DIAGNOSIS — M10.072 IDIOPATHIC GOUT OF LEFT ANKLE, UNSPECIFIED CHRONICITY: ICD-10-CM

## 2019-06-12 DIAGNOSIS — I10 ESSENTIAL HYPERTENSION WITH GOAL BLOOD PRESSURE LESS THAN 140/90: ICD-10-CM

## 2019-06-12 DIAGNOSIS — N18.30 CKD (CHRONIC KIDNEY DISEASE) STAGE 3, GFR 30-59 ML/MIN (HCC): ICD-10-CM

## 2019-06-12 NOTE — PROGRESS NOTES
Adonis Shone is a 62 y.o. male (: 1965) presenting to address:    Chief Complaint   Patient presents with    CHF     follow up    Hypertension     follow up    Chronic Kidney Disease     follow up    Gout     follow up       Vitals:    19 1643   BP: 106/68   Pulse: 67   Resp: 16   Temp: 96.8 °F (36 °C)   TempSrc: Temporal   SpO2: 92%   Weight: 266 lb 12.8 oz (121 kg)   Height: 5' 7\" (1.702 m)   PainSc:   0 - No pain       Hearing/Vision:   No exam data present    Learning Assessment:     Learning Assessment 2019   PRIMARY LEARNER Patient   PRIMARY LANGUAGE ENGLISH   LEARNER PREFERENCE PRIMARY -     -   ANSWERED BY -   RELATIONSHIP -     Depression Screening:     3 most recent PHQ Screens 2018   Little interest or pleasure in doing things Several days   Feeling down, depressed, irritable, or hopeless Several days   Total Score PHQ 2 2   Thoughts of being better off dead, or hurting yourself in some way -     Fall Risk Assessment:     Fall Risk Assessment, last 12 mths 10/11/2017   Able to walk? Yes   Fall in past 12 months? No     Abuse Screening:     Abuse Screening Questionnaire 2018   Do you ever feel afraid of your partner? N   Are you in a relationship with someone who physically or mentally threatens you? N   Is it safe for you to go home? Y     Coordination of Care Questionaire:   1. Have you been to the ER, urgent care clinic since your last visit? Hospitalized since your last visit? NO    2. Have you seen or consulted any other health care providers outside of the [address] [place] [address] since your last visit? Include any pap smears or colon screening.  NO

## 2019-06-12 NOTE — PROGRESS NOTES
HISTORY OF PRESENT ILLNESS  Eliel Valdez is a 62 y.o. male for follow-up on hypertension hyperlipidemia CHF and gout. She has had no further flares of gout. Has lost 25 pounds secondary to the keto diet. He has had no shortness of breath and is scheduled to see his cardiologist next month. CHF   The history is provided by the patient and medical records. This is a chronic problem. The problem has been gradually improving. Pertinent negatives include no chest pain, no abdominal pain, no headaches and no shortness of breath. Nothing aggravates the symptoms. The symptoms are relieved by medications. Treatments tried: Spironolactone and Coreg and hydralazine. The treatment provided significant relief. Hypertension    The history is provided by the patient and medical records. This is a chronic problem. The problem has been gradually improving. Pertinent negatives include no chest pain, no orthopnea, no headaches, no peripheral edema, no dizziness and no shortness of breath. There are no associated agents to hypertension. Risk factors include dyslipidemia, obesity and male gender. Chronic Kidney Disease   The history is provided by the patient and medical records. This is a chronic problem. The problem has been gradually improving. Pertinent negatives include no chest pain, no abdominal pain, no headaches and no shortness of breath. Nothing aggravates the symptoms. Nothing relieves the symptoms. He has tried nothing for the symptoms. Gout   The history is provided by the patient and medical records. This is a recurrent problem. The problem has been resolved. Pertinent negatives include no chest pain, no abdominal pain, no headaches and no shortness of breath. The symptoms are aggravated by eating. The symptoms are relieved by medications. Treatments tried: Colchicine. The treatment provided significant relief.      Allergies   Allergen Reactions    Pcn [Penicillins] Anaphylaxis and Angioedema    Coconut Swelling     Current Outpatient Medications on File Prior to Visit   Medication Sig Dispense Refill    NIFEdipine ER (PROCARDIA XL) 60 mg ER tablet TAKE 1 TABLET BY MOUTH ONCE DAILY 30 Tab 0    hydrALAZINE (APRESOLINE) 25 mg tablet TAKE 1 TABLET BY MOUTH THREE TIMES DAILY 90 Tab 1    carvedilol (COREG) 25 mg tablet Take 1 Tab by mouth two (2) times daily (with meals). 60 Tab 6    spironolactone (ALDACTONE) 25 mg tablet TAKE ONE-HALF TABLET BY MOUTH ONCE DAILY 30 Tab 6    FLUoxetine (PROZAC) 10 mg capsule Take  by mouth daily.  atorvastatin (LIPITOR) 20 mg tablet Take 1 Tab by mouth daily. 30 Tab 0    MISCELLANEOUS MEDICAL SUPPLY (COMPRESSION STOCKINGS) by Does Not Apply route. Wear while awake. Remove at HS.  LORazepam (ATIVAN) 1 mg tablet Take 1 Tab by mouth every eight (8) hours as needed. Max Daily Amount: 3 mg. Indications: ANXIETY, INSOMNIA 90 Tab 0    aspirin delayed-release 81 mg tablet Take 81 mg by mouth daily.  rosuvastatin (CRESTOR) 10 mg tablet Take 1 Tab by mouth nightly for 180 days. 90 Tab 1     No current facility-administered medications on file prior to visit.       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     Cardiomyopathy ([facility]     [facility]     CVA (cerebral infarction)     Depression     ED (erectile dysfunction)     Gastritis     Gout     HTN (hypertension)     Hyperlipemia, mixed     Hypoxia     Kidney disease, chronic, stage III (moderate, EGFR 30-59 ml/min) ([facility])     Pre-diabetes     Sleep apnea     Vitamin D deficiency      Past Surgical History:   Procedure Laterality Date    HX ACL RECONSTRUCTION      HX APPENDECTOMY  2004    became septic from a rupture     Family History   Problem Relation Age of Onset    No Known Problems Mother     Hypertension Father     Diabetes Father     Cancer Father         prostate    No Known Problems Sister     Hypertension Maternal Grandmother     No Known Problems Maternal Grandfather     Alcohol abuse Paternal Grandmother     Bipolar Disorder Paternal Grandmother     No Known Problems Paternal Grandfather      Social History     Socioeconomic History    Marital status:      Spouse name: Not on file    Number of children: Not on file    Years of education: Not on file    Highest education level: Not on file   Occupational History    Not on file   Social Needs    Financial resource strain: Not on file    Food insecurity:     Worry: Not on file     Inability: Not on file    Transportation needs:     Medical: Not on file     Non-medical: Not on file   Tobacco Use    Smoking status: Never Smoker    Smokeless tobacco: Never Used   Substance and Sexual Activity    Alcohol use: No     Comment: holidays/ occasions    Drug use: No    Sexual activity: Yes     Partners: Female   Lifestyle    Physical activity:     Days per week: Not on file     Minutes per session: Not on file    Stress: Not on file   Relationships    Social connections:     Talks on phone: Not on file     Gets together: Not on file     Attends Islam service: Not on file     Active member of club or organization: Not on file     Attends meetings of clubs or organizations: Not on file     Relationship status: Not on file    Intimate partner violence:     Fear of current or ex partner: Not on file     Emotionally abused: Not on file     Physically abused: Not on file     Forced sexual activity: Not on file   Other Topics Concern     Service Yes     Comment: [employer]    Blood Transfusions No    Caffeine Concern No    Occupational Exposure No    Hobby Hazards No    Sleep Concern No     Comment: sleep apnea, has cpap    Stress Concern Yes     Comment: family related    Weight Concern No     Comment: Pt has lost 20lbs but is wanting to lose more    Special Diet Yes     Comment: no salt    Back Care No    Exercise No    Bike Helmet No    Seat Belt Yes    Self-Exams Yes   Social History Narrative    Not on file         Review of Systems   Constitutional: Negative. Eyes: Negative. Respiratory: Negative. Negative for shortness of breath. Cardiovascular: Negative. Negative for chest pain and orthopnea. Gastrointestinal: Negative for abdominal pain. Musculoskeletal: Positive for gout. Neurological: Negative. Negative for dizziness and headaches. Endo/Heme/Allergies: Negative. Psychiatric/Behavioral: Negative. Visit Vitals  /68 (BP 1 Location: Right arm, BP Patient Position: Sitting)   Pulse 67   Temp 96.8 °F (36 °C) (Temporal)   Resp 16   Ht 5' 7\" (1.702 m)   Wt 266 lb 12.8 oz (121 kg)   SpO2 92%   BMI 41.79 kg/m²       Physical Exam   Constitutional: He is oriented to person, place, and time. He appears well-developed and well-nourished. HENT:   Head: Normocephalic and atraumatic. Cardiovascular: Normal rate, regular rhythm, normal heart sounds and intact distal pulses. Exam reveals no gallop and no friction rub. No murmur heard. Pulmonary/Chest: Effort normal and breath sounds normal. No respiratory distress. He has no wheezes. He has no rales. Musculoskeletal: Normal range of motion. He exhibits no edema or tenderness. Neurological: He is alert and oriented to person, place, and time. No cranial nerve deficit. Coordination normal.   Skin: Skin is warm and dry. No rash noted. No erythema. No pallor. Psychiatric: He has a normal mood and affect. His behavior is normal. Thought content normal.   Nursing note and vitals reviewed. ASSESSMENT and PLAN    ICD-10-CM ICD-9-CM    1. CKD (chronic kidney disease) stage 3, GFR 30-59 ml/min ([facility]) E86.0 742.8 METABOLIC PANEL, COMPREHENSIVE      CBC WITH AUTOMATED DIFF   2. Essential hypertension with goal blood pressure less than 140/90 J54 511.4 METABOLIC PANEL, COMPREHENSIVE   3.  Congestive heart failure, unspecified HF chronicity, unspecified [facility] failure [facility] I50.9 974.3 METABOLIC PANEL, COMPREHENSIVE   4. Hyperlipidemia, unspecified hyperlipidemia type E78.5 272.4 LIPID PANEL   5. Idiopathic gout of left ankle, unspecified chronicity M10.072 274.00      Follow-up and Dispositions    · Return in about 6 months (around 12/12/2019).

## 2019-06-14 LAB
A-G RATIO,AGRAT: 1.3 RATIO (ref 1.1–2.6)
ALBUMIN SERPL-MCNC: 4.4 G/DL (ref 3.5–5)
ALP SERPL-CCNC: 84 U/L (ref 25–115)
ALT SERPL-CCNC: 18 U/L (ref 5–40)
ANION GAP SERPL CALC-SCNC: 16 MMOL/L
AST SERPL W P-5'-P-CCNC: 14 U/L (ref 10–37)
BASOPHILS ABS-DIF,2030: 0.1 K/UL (ref 0–0.2)
BASOPHILS C MAN (DIFF), 1068: 1 % (ref 0–2)
BILIRUB SERPL-MCNC: 0.3 MG/DL (ref 0.2–1.2)
BUN SERPL-MCNC: 34 MG/DL (ref 6–22)
CALCIUM SERPL-MCNC: 9.2 MG/DL (ref 8.4–10.4)
CHLORIDE SERPL-SCNC: 103 MMOL/L (ref 98–110)
CHOLEST SERPL-MCNC: 125 MG/DL (ref 110–200)
CO2 SERPL-SCNC: 20 MMOL/L (ref 20–32)
CREAT SERPL-MCNC: 1.9 MG/DL (ref 0.5–1.2)
EOS ABS-DIF,2069: 0.6 K/UL (ref 0–0.5)
EOSINOPHILS C MAN (DIFF), 1067: 8 % (ref 0–6)
ERYTHROCYTE [DISTWIDTH] IN BLOOD BY AUTOMATED COUNT: 14 % (ref 10–15.5)
GFRAA, 66117: 44.1
GFRNA, 66118: 36.4
GLOBULIN,GLOB: 3.5 G/DL (ref 2–4)
GLUCOSE SERPL-MCNC: 79 MG/DL (ref 70–99)
HCT VFR BLD AUTO: 43.3 % (ref 39.3–51.6)
HDLC SERPL-MCNC: 2.8 MG/DL (ref 0–5)
HDLC SERPL-MCNC: 45 MG/DL (ref 40–59)
HGB BLD-MCNC: 13.7 G/DL (ref 13.1–17.2)
LDLC SERPL CALC-MCNC: 52 MG/DL (ref 50–99)
LYMPHOCYTES C MAN (DIFF), 1065: 23 % (ref 20–45)
LYMPHS ABS-DIF,2105: 1.9 K/UL (ref 1–4.8)
MACROCYTOSIS,MACRO: ABNORMAL
MCH RBC QN AUTO: 31 PG (ref 26–34)
MCHC RBC AUTO-ENTMCNC: 32 G/DL (ref 31–36)
MCV RBC AUTO: 97 FL (ref 80–95)
MONOCYTES ABS-DIF,2141: 0.8 K/UL (ref 0.1–1)
MONOCYTES C MAN (DIFF), 1066: 10 % (ref 3–12)
NEUTROPHILS ABS,2156: 4.9 K/UL (ref 1.8–7.7)
NEUTROPHILS C MAN (DIFF), 1064: 59 % (ref 40–75)
NORMOCHROMIC CELLAVISION, 1078: ABNORMAL
PLATELET # BLD AUTO: 247 K/UL (ref 140–440)
PMV BLD AUTO: 10.5 FL (ref 9–13)
POTASSIUM SERPL-SCNC: 4.7 MMOL/L (ref 3.5–5.5)
PROT SERPL-MCNC: 7.9 G/DL (ref 6.4–8.3)
RBC # BLD AUTO: 4.48 M/UL (ref 3.8–5.8)
SMEAR EVAL, 1131: ABNORMAL
SODIUM SERPL-SCNC: 139 MMOL/L (ref 133–145)
TRIGL SERPL-MCNC: 137 MG/DL (ref 40–149)
VLDLC SERPL CALC-MCNC: 27 MG/DL (ref 8–30)
WBC # BLD AUTO: 7.8 K/UL (ref 4–11)

## 2019-06-15 NOTE — TELEPHONE ENCOUNTER
Requested Prescriptions     Pending Prescriptions Disp Refills    carvedilol (COREG) 25 mg tablet 60 Tab 6     Sig: Take 1 Tab by mouth two (2) times daily (with meals).

## 2019-06-17 RX ORDER — CARVEDILOL 25 MG/1
25 TABLET ORAL 2 TIMES DAILY WITH MEALS
Qty: 60 TAB | Refills: 6 | Status: SHIPPED | OUTPATIENT
Start: 2019-06-17 | End: 2020-01-03 | Stop reason: SDUPTHER

## 2019-06-27 DIAGNOSIS — I10 ESSENTIAL HYPERTENSION WITH GOAL BLOOD PRESSURE LESS THAN 140/90: ICD-10-CM

## 2019-06-27 DIAGNOSIS — N18.30 CKD (CHRONIC KIDNEY DISEASE) STAGE 3, GFR 30-59 ML/MIN (HCC): ICD-10-CM

## 2019-06-27 RX ORDER — HYDRALAZINE HYDROCHLORIDE 25 MG/1
TABLET, FILM COATED ORAL
Qty: 90 TAB | Refills: 1 | OUTPATIENT
Start: 2019-06-27

## 2019-06-27 RX ORDER — NIFEDIPINE 60 MG/1
TABLET, EXTENDED RELEASE ORAL
Qty: 30 TAB | Refills: 0 | OUTPATIENT
Start: 2019-06-27

## 2019-06-27 NOTE — TELEPHONE ENCOUNTER
Requested Prescriptions     Pending Prescriptions Disp Refills    hydrALAZINE (APRESOLINE) 25 mg tablet [Pharmacy Med Name: HydrALAZINE 25MG    TAB] 90 Tab 1     Sig: TAKE 1 TABLET BY MOUTH THREE TIMES DAILY    NIFEdipine ER (PROCARDIA XL) 60 mg ER tablet [Pharmacy Med Name: NIFEDIPINE ER OSMOTIC 60MG TAB] 30 Tab 0     Sig: TAKE 1 TABLET BY MOUTH ONCE DAILY (NEED  APPOINTMENT  FOR  FURTHER  REFILLS)

## 2019-06-27 NOTE — TELEPHONE ENCOUNTER
Per fax,    Requested Prescriptions     Pending Prescriptions Disp Refills    hydrALAZINE (APRESOLINE) 25 mg tablet [Pharmacy Med Name: HydrALAZINE 25MG    TAB] 90 Tab 1     Sig: TAKE 1 TABLET BY MOUTH THREE TIMES DAILY

## 2019-06-28 NOTE — TELEPHONE ENCOUNTER
Pt called requesting refill on   Requested Prescriptions     Refused Prescriptions Disp Refills    hydrALAZINE (APRESOLINE) 25 mg tablet [Pharmacy Med Name: HydrALAZINE 25MG    TAB] 90 Tab 1     Sig: TAKE 1 TABLET BY MOUTH THREE TIMES DAILY     Refused By: Dru Brar     Reason for Refusal: Request already responded to by other means (e.g. phone or fax)    NIFEdipine ER (PROCARDIA XL) 60 mg ER tablet [Pharmacy Med Name: NIFEDIPINE ER OSMOTIC 60MG TAB] 30 Tab 0     Sig: TAKE 1 TABLET BY MOUTH ONCE DAILY (NEED  APPOINTMENT  FOR  FURTHER  REFILLS)     Refused By: Dru Brar     Reason for Refusal: Request already responded to by other means (e.g. phone or fax)     States pharmacy does not have new rxs and he is completely out of these meds.

## 2019-11-11 ENCOUNTER — OFFICE VISIT (OUTPATIENT)
Dept: FAMILY MEDICINE CLINIC | Age: 54
End: 2019-11-11

## 2019-11-11 VITALS
HEART RATE: 75 BPM | DIASTOLIC BLOOD PRESSURE: 64 MMHG | OXYGEN SATURATION: 93 % | BODY MASS INDEX: 40.4 KG/M2 | TEMPERATURE: 97.3 F | WEIGHT: 257.4 LBS | RESPIRATION RATE: 16 BRPM | SYSTOLIC BLOOD PRESSURE: 104 MMHG | HEIGHT: 67 IN

## 2019-11-11 DIAGNOSIS — N18.30 CKD (CHRONIC KIDNEY DISEASE) STAGE 3, GFR 30-59 ML/MIN (HCC): Primary | ICD-10-CM

## 2019-11-11 DIAGNOSIS — E78.5 HYPERLIPIDEMIA, UNSPECIFIED HYPERLIPIDEMIA TYPE: ICD-10-CM

## 2019-11-11 DIAGNOSIS — I50.9 CONGESTIVE HEART FAILURE, UNSPECIFIED HF CHRONICITY, UNSPECIFIED HEART FAILURE TYPE (HCC): ICD-10-CM

## 2019-11-11 DIAGNOSIS — I10 ESSENTIAL HYPERTENSION: ICD-10-CM

## 2019-11-11 NOTE — PROGRESS NOTES
Penny Frey is a 62 y.o. male (: 1965) presenting to address:    Chief Complaint   Patient presents with    Hypertension     f/u    Cholesterol Problem     f/u    Blood sugar problem     f/u       Vitals:    19 1516   BP: 104/64   Pulse: 75   Resp: 16   Temp: 97.3 °F (36.3 °C)   TempSrc: Temporal   SpO2: 93%   Weight: 257 lb 6.4 oz (116.8 kg)   Height: 5' 7\" (1.702 m)   PainSc:   0 - No pain       Hearing/Vision:   No exam data present    Learning Assessment:     Learning Assessment 2019   PRIMARY LEARNER Patient   PRIMARY LANGUAGE ENGLISH   LEARNER PREFERENCE PRIMARY -     -   ANSWERED BY -   RELATIONSHIP -     Depression Screening:     3 most recent PHQ Screens 2019   PHQ Not Done Active Diagnosis of Depression or Bipolar Disorder   Little interest or pleasure in doing things Several days   Feeling down, depressed, irritable, or hopeless Several days   Total Score PHQ 2 2   Thoughts of being better off dead, or hurting yourself in some way -     Fall Risk Assessment:     Fall Risk Assessment, last 12 mths 2019   Able to walk? Yes   Fall in past 12 months? No     Abuse Screening:     Abuse Screening Questionnaire 2019   Do you ever feel afraid of your partner? N   Are you in a relationship with someone who physically or mentally threatens you? N   Is it safe for you to go home? Y     Coordination of Care Questionaire:   1. Have you been to the ER, urgent care clinic since your last visit? Hospitalized since your last visit? NO    2. Have you seen or consulted any other health care providers outside of the [address] [place] [address] since your last visit? Include any pap smears or colon screening.  NO

## 2019-11-11 NOTE — PROGRESS NOTES
HISTORY OF PRESENT ILLNESS  Judah Frost is a 62 y.o. male here for follow-up on hypertension hyperlipidemia CHF chronic kidney disease. Patient is feeling well and has no complaints today. Apparently the cardiologist stopped hydralazine. Hypertension    The history is provided by the patient and medical records. This is a chronic problem. The problem has been rapidly improving. Pertinent negatives include no chest pain, no orthopnea, no headaches, no peripheral edema, no dizziness and no shortness of breath. There are no associated agents to hypertension. Risk factors include dyslipidemia, obesity and male gender. Cholesterol Problem   The history is provided by the patient and medical records. This is a chronic problem. The problem has been gradually improving. Pertinent negatives include no chest pain, no abdominal pain, no headaches and no shortness of breath. The symptoms are aggravated by eating. The symptoms are relieved by medications. Chronic Kidney Disease   The history is provided by the patient and medical records. This is a chronic problem. The problem has been gradually worsening. Pertinent negatives include no chest pain, no abdominal pain, no headaches and no shortness of breath. Nothing aggravates the symptoms. Nothing relieves the symptoms. He has tried nothing for the symptoms. CHF   The history is provided by the patient and medical records. This is a chronic problem. The problem has been gradually improving. Pertinent negatives include no chest pain, no abdominal pain, no headaches and no shortness of breath. Nothing aggravates the symptoms. The symptoms are relieved by medications. Treatments tried: Lasix Spironolactone and losartan. The treatment provided significant relief.      Allergies   Allergen Reactions    Pcn [Penicillins] Anaphylaxis and Angioedema    Coconut Swelling     Current Outpatient Medications on File Prior to Visit   Medication Sig Dispense Refill    NIFEdipine ER (PROCARDIA XL) 60 mg ER tablet Take 1 Tab by mouth daily for 180 days. 90 Tab 1    carvedilol (COREG) 25 mg tablet Take 1 Tab by mouth two (2) times daily (with meals). 60 Tab 6    spironolactone (ALDACTONE) 25 mg tablet TAKE ONE-HALF TABLET BY MOUTH ONCE DAILY 30 Tab 6    FLUoxetine (PROZAC) 10 mg capsule Take  by mouth daily.  MISCELLANEOUS MEDICAL SUPPLY (COMPRESSION STOCKINGS) by Does Not Apply route. Wear while awake. Remove at HS.  LORazepam (ATIVAN) 1 mg tablet Take 1 Tab by mouth every eight (8) hours as needed. Max Daily Amount: 3 mg. Indications: ANXIETY, INSOMNIA 90 Tab 0    aspirin delayed-release 81 mg tablet Take 81 mg by mouth daily.  [DISCONTINUED] hydrALAZINE (APRESOLINE) 25 mg tablet Take 1 Tab by mouth three (3) times daily for 180 days. TAKE 1 TABLET BY MOUTH THREE TIMES DAILY 270 Tab 1    [DISCONTINUED] rosuvastatin (CRESTOR) 10 mg tablet TAKE 1 TABLET BY MOUTH AT BEDTIME 30 Tab 6    rosuvastatin (CRESTOR) 10 mg tablet Take 1 Tab by mouth nightly for 180 days. 90 Tab 1    [DISCONTINUED] atorvastatin (LIPITOR) 20 mg tablet Take 1 Tab by mouth daily. 30 Tab 0     No current facility-administered medications on file prior to visit.       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     Cardiomyopathy [facility]     [facility]     CVA (cerebral infarction)     Depression     ED (erectile dysfunction)     Gastritis     Gout     HTN (hypertension)     Hyperlipemia, mixed     Hypoxia     Kidney disease, chronic, stage III (moderate, EGFR 30-59 ml/min) ([facility])     Pre-diabetes     Sleep apnea     Vitamin D deficiency      Past Surgical History:   Procedure Laterality Date    HX ACL RECONSTRUCTION      HX APPENDECTOMY  2004    became septic from a rupture     Family History   Problem Relation Age of Onset    No Known Problems Mother     Hypertension Father     Diabetes Father     Cancer Father prostate    No Known Problems Sister     Hypertension Maternal Grandmother     No Known Problems Maternal Grandfather     Alcohol abuse Paternal Grandmother     Bipolar Disorder Paternal Grandmother     No Known Problems Paternal Grandfather      Social History     Socioeconomic History    Marital status:      Spouse name: Not on file    Number of children: Not on file    Years of education: Not on file    Highest education level: Not on file   Occupational History    Not on file   Social Needs    Financial resource strain: Not on file    Food insecurity:     Worry: Not on file     Inability: Not on file    Transportation needs:     Medical: Not on file     Non-medical: Not on file   Tobacco Use    Smoking status: Never Smoker    Smokeless tobacco: Never Used   Substance and Sexual Activity    Alcohol use: No     Comment: holidays/ occasions    Drug use: No    Sexual activity: Yes     Partners: Female   Lifestyle    Physical activity:     Days per week: Not on file     Minutes per session: Not on file    Stress: Not on file   Relationships    Social connections:     Talks on phone: Not on file     Gets together: Not on file     Attends Taoist service: Not on file     Active member of club or organization: Not on file     Attends meetings of clubs or organizations: Not on file     Relationship status: Not on file    Intimate partner violence:     Fear of current or ex partner: Not on file     Emotionally abused: Not on file     Physically abused: Not on file     Forced sexual activity: Not on file   Other Topics Concern     Service Yes     Comment: [employer]    Blood Transfusions No    Caffeine Concern No    Occupational Exposure No    Hobby Hazards No    Sleep Concern No     Comment: sleep apnea, has cpap    Stress Concern Yes     Comment: family related    Weight Concern No     Comment: Pt has lost 20lbs but is wanting to lose more    Special Diet Yes Comment: no salt    Back Care No    Exercise No    Bike Helmet No    Seat Belt Yes    Self-Exams Yes   Social History Narrative    Not on file       Review of Systems   Constitutional: Negative. Eyes: Negative. Respiratory: Negative. Negative for shortness of breath. Cardiovascular: Negative. Negative for chest pain and orthopnea. Gastrointestinal: Negative for abdominal pain. Musculoskeletal: Negative. Neurological: Negative. Negative for dizziness and headaches. Endo/Heme/Allergies: Negative. Psychiatric/Behavioral: Negative. Visit Vitals  /64 (BP 1 Location: Left arm, BP Patient Position: Sitting)   Pulse 75   Temp 97.3 °F (36.3 °C) (Temporal)   Resp 16   Ht 5' 7\" (1.702 m)   Wt 257 lb 6.4 oz (116.8 kg)   SpO2 93%   BMI 40.31 kg/m²       Physical Exam   Constitutional: He is oriented to person, place, and time. He appears well-developed and well-nourished. HENT:   Head: Normocephalic and atraumatic. Cardiovascular: Normal rate, regular rhythm, normal heart sounds and intact distal pulses. Exam reveals no gallop and no friction rub. No murmur heard. Pulmonary/Chest: Effort normal and breath sounds normal. No respiratory distress. He has no wheezes. He has no rales. Musculoskeletal: Normal range of motion. He exhibits no edema or tenderness. Neurological: He is alert and oriented to person, place, and time. No cranial nerve deficit. Coordination normal.   Skin: Skin is warm and dry. No rash noted. No erythema. No pallor. Psychiatric: He has a normal mood and affect. His behavior is normal. Thought content normal.   Nursing note and vitals reviewed. ASSESSMENT and PLAN    ICD-10-CM ICD-9-CM    1. CKD (chronic kidney disease) stage 3, GFR 30-59 ml/min ([facility]) T65.2 624.0 METABOLIC PANEL, COMPREHENSIVE      URINALYSIS W/ RFLX MICROSCOPIC      URINALYSIS W/ RFLX MICROSCOPIC      METABOLIC PANEL, COMPREHENSIVE   2.  Hyperlipidemia, unspecified hyperlipidemia type B16.3 617.4 METABOLIC PANEL, COMPREHENSIVE      LIPID PANEL      LIPID PANEL      METABOLIC PANEL, COMPREHENSIVE   3. Essential hypertension I10 401.9    4. Congestive heart failure, unspecified HF chronicity, unspecified heart failure type (HCC) I50.9 428.0    Discussion was had about treatment plan and medications  Time spent was more than 45 minutes . Greater than 50% of which was spent counseling    Follow-up and Dispositions    · Return in about 6 months (around 5/11/2020).

## 2019-11-12 LAB
A-G RATIO,AGRAT: 1.2 RATIO (ref 1.1–2.6)
ALBUMIN SERPL-MCNC: 4.2 G/DL (ref 3.5–5)
ALP SERPL-CCNC: 78 U/L (ref 25–115)
ALT SERPL-CCNC: 18 U/L (ref 5–40)
ANION GAP SERPL CALC-SCNC: 15 MMOL/L
AST SERPL W P-5'-P-CCNC: 20 U/L (ref 10–37)
BILIRUB SERPL-MCNC: 0.2 MG/DL (ref 0.2–1.2)
BILIRUB UR QL: NEGATIVE
BUN SERPL-MCNC: 25 MG/DL (ref 6–22)
CALCIUM SERPL-MCNC: 8.6 MG/DL (ref 8.4–10.4)
CHLORIDE SERPL-SCNC: 100 MMOL/L (ref 98–110)
CHOLEST SERPL-MCNC: 130 MG/DL (ref 110–200)
CO2 SERPL-SCNC: 23 MMOL/L (ref 20–32)
CREAT SERPL-MCNC: 1.8 MG/DL (ref 0.5–1.2)
GFRAA, 66117: 46.9
GFRNA, 66118: 38.7
GLOBULIN,GLOB: 3.4 G/DL (ref 2–4)
GLUCOSE SERPL-MCNC: 85 MG/DL (ref 70–99)
GLUCOSE UR QL: NEGATIVE MG/DL
HDLC SERPL-MCNC: 2.2 MG/DL (ref 0–5)
HDLC SERPL-MCNC: 59 MG/DL (ref 40–59)
HGB UR QL STRIP: NEGATIVE
KETONES UR QL STRIP.AUTO: NEGATIVE MG/DL
LDLC SERPL CALC-MCNC: 54 MG/DL (ref 50–99)
LEUKOCYTE ESTERASE: NEGATIVE
NITRITE UR QL STRIP.AUTO: NEGATIVE
PH UR STRIP: 6 PH (ref 5–8)
POTASSIUM SERPL-SCNC: 5 MMOL/L (ref 3.5–5.5)
PROT SERPL-MCNC: 7.6 G/DL (ref 6.4–8.3)
PROT UR QL STRIP: NEGATIVE MG/DL
SODIUM SERPL-SCNC: 138 MMOL/L (ref 133–145)
SP GR UR: 1.01 (ref 1–1.03)
TRIGL SERPL-MCNC: 88 MG/DL (ref 40–149)
UROBILINOGEN UR STRIP-MCNC: <2 MG/DL
VLDLC SERPL CALC-MCNC: 18 MG/DL (ref 8–30)

## 2020-01-03 DIAGNOSIS — I10 ESSENTIAL HYPERTENSION WITH GOAL BLOOD PRESSURE LESS THAN 140/90: ICD-10-CM

## 2020-01-03 RX ORDER — CARVEDILOL 25 MG/1
25 TABLET ORAL 2 TIMES DAILY WITH MEALS
Qty: 60 TAB | Refills: 6 | Status: SHIPPED | OUTPATIENT
Start: 2020-01-03 | End: 2020-06-03 | Stop reason: SDUPTHER

## 2020-01-03 RX ORDER — NIFEDIPINE 60 MG/1
60 TABLET, EXTENDED RELEASE ORAL DAILY
Qty: 90 TAB | Refills: 1 | Status: SHIPPED | OUTPATIENT
Start: 2020-01-03 | End: 2020-06-03 | Stop reason: SDUPTHER

## 2020-01-03 NOTE — TELEPHONE ENCOUNTER
Patient see's Dr. Cami Smart only have 3 pills left.   Last appt was: 11/11/2019  Next appt is: 5/11/2020

## 2020-01-03 NOTE — TELEPHONE ENCOUNTER
Requested Prescriptions     Pending Prescriptions Disp Refills    NIFEdipine ER (PROCARDIA XL) 60 mg ER tablet 90 Tab 1     Sig: Take 1 Tab by mouth daily for 180 days.  carvedilol (COREG) 25 mg tablet 60 Tab 6     Sig: Take 1 Tab by mouth two (2) times daily (with meals).      Patient states that he only has 3 pills left of the procardia 60 mg

## 2020-06-03 ENCOUNTER — VIRTUAL VISIT (OUTPATIENT)
Dept: FAMILY MEDICINE CLINIC | Age: 55
End: 2020-06-03

## 2020-06-03 DIAGNOSIS — E78.5 HYPERLIPIDEMIA, UNSPECIFIED HYPERLIPIDEMIA TYPE: ICD-10-CM

## 2020-06-03 DIAGNOSIS — I10 ESSENTIAL HYPERTENSION WITH GOAL BLOOD PRESSURE LESS THAN 140/90: ICD-10-CM

## 2020-06-03 DIAGNOSIS — N18.30 CKD (CHRONIC KIDNEY DISEASE) STAGE 3, GFR 30-59 ML/MIN (HCC): Primary | ICD-10-CM

## 2020-06-03 DIAGNOSIS — I50.9 CONGESTIVE HEART FAILURE, UNSPECIFIED HF CHRONICITY, UNSPECIFIED HEART FAILURE TYPE (HCC): ICD-10-CM

## 2020-06-03 DIAGNOSIS — N18.30 CKD (CHRONIC KIDNEY DISEASE) STAGE 3, GFR 30-59 ML/MIN (HCC): ICD-10-CM

## 2020-06-03 RX ORDER — NIFEDIPINE 60 MG/1
60 TABLET, EXTENDED RELEASE ORAL DAILY
Qty: 90 TAB | Refills: 1 | Status: SHIPPED | OUTPATIENT
Start: 2020-06-03 | End: 2021-07-30 | Stop reason: ALTCHOICE

## 2020-06-03 RX ORDER — CARVEDILOL 25 MG/1
25 TABLET ORAL 2 TIMES DAILY WITH MEALS
Qty: 60 TAB | Refills: 6 | Status: SHIPPED | OUTPATIENT
Start: 2020-06-03 | End: 2020-12-11

## 2020-06-03 RX ORDER — ROSUVASTATIN CALCIUM 10 MG/1
10 TABLET, COATED ORAL
Qty: 90 TAB | Refills: 1 | Status: SHIPPED | OUTPATIENT
Start: 2020-06-03 | End: 2020-12-21

## 2020-06-03 RX ORDER — FLUOXETINE 10 MG/1
10 CAPSULE ORAL DAILY
Qty: 90 CAP | Refills: 1 | Status: SHIPPED | OUTPATIENT
Start: 2020-06-03 | End: 2021-01-11 | Stop reason: SDUPTHER

## 2020-06-03 NOTE — PROGRESS NOTES
HISTORY OF PRESENT ILLNESS  Kristel Collado is a 60 y.o. male here for follow-up on hypertension congestive heart failure chronic kidney disease and hyperlipidemia. Patient states that his nephrologist stop spironolactone secondary to hyperkalemia. He states his blood pressures have remained controlled with a systolic of about 778. .    Consent:  he and/or  healthcare decision maker is aware that this patient-initiated Telehealth encounter is a billable service, with coverage as determined by her insurance carrier. he is aware that she may receive a bill and has provided verbal consent to proceed: Yes    I was at home while conducting this encounter. Hypertension    The history is provided by the patient and medical records. This is a chronic problem. The problem has been rapidly improving. Pertinent negatives include no chest pain, no orthopnea, no headaches, no peripheral edema, no dizziness and no shortness of breath. There are no associated agents to hypertension. Risk factors include dyslipidemia, obesity and male gender. Cholesterol Problem   The history is provided by the patient and medical records. This is a chronic problem. The problem has been gradually improving. Pertinent negatives include no chest pain, no abdominal pain, no headaches and no shortness of breath. The symptoms are aggravated by eating. The symptoms are relieved by medications. Chronic Kidney Disease   The history is provided by the patient and medical records. This is a chronic problem. The problem has been gradually worsening. Pertinent negatives include no chest pain, no abdominal pain, no headaches and no shortness of breath. Nothing aggravates the symptoms. Nothing relieves the symptoms. He has tried nothing for the symptoms. CHF   The history is provided by the patient and medical records. This is a chronic problem. The problem has been gradually improving.  Pertinent negatives include no chest pain, no abdominal pain, no headaches and no shortness of breath. Nothing aggravates the symptoms. The symptoms are relieved by medications. Treatments tried: Lasix Spironolactone and losartan. The treatment provided significant relief. Marianne Mosley all  Current Outpatient Medications on File Prior to Visit   Medication Sig Dispense Refill    [DISCONTINUED] spironolactone (ALDACTONE) 25 mg tablet 1 tablet by mouth daily 30 Tab 5    [DISCONTINUED] NIFEdipine ER (PROCARDIA XL) 60 mg ER tablet Take 1 Tab by mouth daily for 180 days. 90 Tab 1    [DISCONTINUED] carvedilol (COREG) 25 mg tablet Take 1 Tab by mouth two (2) times daily (with meals). 60 Tab 6    [DISCONTINUED] rosuvastatin (CRESTOR) 10 mg tablet Take 1 Tab by mouth nightly for 180 days. 90 Tab 1    [DISCONTINUED] FLUoxetine (PROZAC) 10 mg capsule Take  by mouth daily.  MISCELLANEOUS MEDICAL SUPPLY (COMPRESSION STOCKINGS) by Does Not Apply route. Wear while awake. Remove at HS.  LORazepam (ATIVAN) 1 mg tablet Take 1 Tab by mouth every eight (8) hours as needed. Max Daily Amount: 3 mg. Indications: ANXIETY, INSOMNIA 90 Tab 0    aspirin delayed-release 81 mg tablet Take 81 mg by mouth daily. No current facility-administered medications on file prior to visit.       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     Cardiomyopathy [facility]     [facility]     CVA (cerebral infarction)     Depression     ED (erectile dysfunction)     Gastritis     Gout     HTN (hypertension)     Hyperlipemia, mixed     Hypoxia     Kidney disease, chronic, stage III (moderate, EGFR 30-59 ml/min) ([facility]     Pre-diabetes     Sleep apnea     Vitamin D deficiency      Past Surgical History:   Procedure Laterality Date    HX ACL RECONSTRUCTION      HX APPENDECTOMY  2004    became septic from a rupture     Family History   Problem Relation Age of Onset    No Known Problems Mother     Hypertension Father     Diabetes Father  Cancer Father         prostate    No Known Problems Sister     Hypertension Maternal Grandmother     No Known Problems Maternal Grandfather     Alcohol abuse Paternal Grandmother     Bipolar Disorder Paternal Grandmother     No Known Problems Paternal Grandfather      Social History     Socioeconomic History    Marital status:      Spouse name: Not on file    Number of children: Not on file    Years of education: Not on file    Highest education level: Not on file   Occupational History    Not on file   Social Needs    Financial resource strain: Not on file    Food insecurity     Worry: Not on file     Inability: Not on file   [employer] needs     Medical: Not on file     Non-medical: Not on file   Tobacco Use    Smoking status: Never Smoker    Smokeless tobacco: Never Used   Substance and Sexual Activity    Alcohol use: No     Comment: holidays/ occasions    Drug use: No    Sexual activity: Yes     Partners: Female   Lifestyle    Physical activity     Days per week: Not on file     Minutes per session: Not on file    Stress: Not on file   Relationships    Social connections     Talks on phone: Not on file     Gets together: Not on file     Attends Pentecostalism service: Not on file     Active member of club or organization: Not on file     Attends meetings of clubs or organizations: Not on file     Relationship status: Not on file    Intimate partner violence     Fear of current or ex partner: Not on file     Emotionally abused: Not on file     Physically abused: Not on file     Forced sexual activity: Not on file   Other Topics Concern     Service Yes     Comment: [employer]    Blood Transfusions No    Caffeine Concern No    Occupational Exposure No    Hobby Hazards No    Sleep Concern No     Comment: sleep apnea, has cpap    Stress Concern Yes     Comment: family related    Weight Concern No     Comment: Pt has lost 20lbs but is wanting to lose more    Special Diet Yes     Comment: no salt    Back Care No    Exercise No    Bike Helmet No    Seat Belt Yes    Self-Exams Yes   Social History Narrative    Not on file       Review of Systems   Constitutional: Negative. Eyes: Negative. Respiratory: Negative. Negative for shortness of breath. Cardiovascular: Negative. Negative for chest pain and orthopnea. Gastrointestinal: Negative for abdominal pain. Musculoskeletal: Negative. Neurological: Negative. Negative for dizziness and headaches. Endo/Heme/Allergies: Negative. Psychiatric/Behavioral: Negative. There were no vitals taken for this visit. Physical Exam  Constitutional:       Appearance: He is well-developed. HENT:      Head: Normocephalic and atraumatic. Pulmonary:      Effort: Pulmonary effort is normal. No respiratory distress. Musculoskeletal: Normal range of motion. General: No tenderness. Skin:     General: Skin is dry. Coloration: Skin is not pale. Findings: No erythema or rash. Neurological:      Mental Status: He is alert and oriented to person, place, and time. Cranial Nerves: No cranial nerve deficit. Coordination: Coordination normal.   Psychiatric:         Behavior: Behavior normal.         Thought Content: Thought content normal.         ASSESSMENT and PLAN    ICD-10-CM ICD-9-CM    1. CKD (chronic kidney disease) stage 3, GFR 30-59 ml/min ([facility]) H94.8 349.3 METABOLIC PANEL, COMPREHENSIVE      URINALYSIS W/ RFLX MICROSCOPIC   2. Essential hypertension with goal blood pressure less than 140/90 T40 130.4 METABOLIC PANEL, COMPREHENSIVE      URINALYSIS W/ RFLX MICROSCOPIC      NIFEdipine ER (PROCARDIA XL) 60 mg ER tablet   3. Hyperlipidemia, unspecified hyperlipidemia type F90.2 319.1 METABOLIC PANEL, COMPREHENSIVE      LIPID PANEL   4.  Congestive heart failure, unspecified HF chronicity, unspecified [facility] failure [facility] Q47.0 015.8 METABOLIC PANEL, COMPREHENSIVE   Time spent was more than 40 minutes. Greater than 50% of which was spent counseling. We discussed the expected course, resolution and complications of the diagnosis(es) in detail. Medication risks, benefits, costs, interactions, and alternatives were discussed as indicated. I advised her to contact the office if her condition worsens, changes or fails to improve as anticipated. She expressed understanding with the diagnosis(es) and plan. Pursuant to the emergency declaration under the [address] [place] [address], [facility] waiver authority and the [employer] Act, this Virtual  Visit was conducted, with patient's consent, to reduce the patient's risk of exposure to COVID-19 and provide continuity of care for an established patient. Services were provided through a video synchronous discussion virtually to substitute for in-person clinic visit. Edilberto Rubin MD      Follow-up and Dispositions    · Return in about 6 months (around 12/3/2020).

## 2020-07-01 ENCOUNTER — TELEPHONE (OUTPATIENT)
Dept: FAMILY MEDICINE CLINIC | Age: 55
End: 2020-07-01

## 2020-07-02 RX ORDER — COLCHICINE 0.6 MG/1
CAPSULE ORAL
Qty: 60 CAP | Refills: 3 | Status: SHIPPED | OUTPATIENT
Start: 2020-07-02 | End: 2021-07-19

## 2021-01-14 ENCOUNTER — VIRTUAL VISIT (OUTPATIENT)
Dept: FAMILY MEDICINE CLINIC | Age: 56
End: 2021-01-14
Payer: COMMERCIAL

## 2021-01-14 DIAGNOSIS — I50.9 CONGESTIVE HEART FAILURE, UNSPECIFIED HF CHRONICITY, UNSPECIFIED HEART FAILURE TYPE (HCC): ICD-10-CM

## 2021-01-14 DIAGNOSIS — N18.32 STAGE 3B CHRONIC KIDNEY DISEASE (HCC): Primary | ICD-10-CM

## 2021-01-14 DIAGNOSIS — E78.5 HYPERLIPIDEMIA, UNSPECIFIED HYPERLIPIDEMIA TYPE: ICD-10-CM

## 2021-01-14 DIAGNOSIS — I10 ESSENTIAL HYPERTENSION: ICD-10-CM

## 2021-01-14 PROBLEM — R05.9 COUGH: Status: RESOLVED | Noted: 2018-10-24 | Resolved: 2021-01-14

## 2021-01-14 PROBLEM — N18.30 CKD (CHRONIC KIDNEY DISEASE) STAGE 3, GFR 30-59 ML/MIN (HCC): Status: RESOLVED | Noted: 2017-02-27 | Resolved: 2021-01-14

## 2021-01-14 PROBLEM — E66.01 OBESITY, MORBID (HCC): Status: RESOLVED | Noted: 2017-11-29 | Resolved: 2021-01-14

## 2021-01-14 PROCEDURE — 99214 OFFICE O/P EST MOD 30 MIN: CPT | Performed by: INTERNAL MEDICINE

## 2021-01-14 NOTE — PATIENT INSTRUCTIONS
Call the office to make an appointment to come in and get blood work done. When you come into the office asked nurse to check your vital signs including heart rate blood pressure and weight

## 2021-01-14 NOTE — PROGRESS NOTES
1. Have you been to the ER, urgent care clinic since your last visit? Hospitalized since your last visit? No    2. Have you seen or consulted any other health care providers outside of the [address] [place] [address] since your last visit? Include any pap smears or colon screening.  No    Chief Complaint   Patient presents with    Follow Up Chronic Condition

## 2021-01-14 NOTE — PROGRESS NOTES
HISTORY OF PRESENT ILLNESS  Bryan Luu is a 60 y.o. male who presents for follow-up on hypertension hyperlipidemia CHF and chronic kidney disease. Patient continues to lose weight. He is now off spironolactone and Prozac. He states that he is breathing well has no complaints today. Consent:  he and/or  healthcare decision maker is aware that this patient-initiated Telehealth encounter is a billable service, with coverage as determined by her insurance carrier. he is aware that she may receive a bill and has provided verbal consent to proceed: Yes    I was at home while conducting this encounter. .  CHF  The history is provided by the patient and medical records. This is a chronic problem. The problem has been gradually improving. Pertinent negatives include no chest pain, no abdominal pain, no headaches and no shortness of breath. Nothing aggravates the symptoms. The symptoms are relieved by medications. Treatments tried: Lasix Spironolactone and losartan. The treatment provided significant relief. Hypertension   The history is provided by the patient and medical records. This is a chronic problem. The problem has been rapidly improving. Pertinent negatives include no chest pain, no orthopnea, no headaches, no peripheral edema, no dizziness and no shortness of breath. There are no associated agents to hypertension. Risk factors include dyslipidemia, obesity and male gender. Cholesterol Problem  The history is provided by the patient and medical records. This is a chronic problem. The problem has been gradually improving. Pertinent negatives include no chest pain, no abdominal pain, no headaches and no shortness of breath. The symptoms are aggravated by eating. The symptoms are relieved by medications. Chronic Kidney Disease  The history is provided by the patient and medical records. This is a chronic problem. The problem has been gradually worsening.  Pertinent negatives include no chest pain, no abdominal pain, no headaches and no shortness of breath. Nothing aggravates the symptoms. Nothing relieves the symptoms. He has tried nothing for the symptoms. Allergies   Allergen Reactions    Pcn [Penicillins] Anaphylaxis and Angioedema    Coconut Swelling     Current Outpatient Medications on File Prior to Visit   Medication Sig Dispense Refill    NIFEdipine ER (ADALAT CC) 60 mg ER tablet TAKE 1 TABLET BY MOUTH EVERY DAY 30 Tab 1    rosuvastatin (CRESTOR) 10 mg tablet TAKE 1 TABLET BY MOUTH ONCE NIGHTLY 30 Tab 1    carvediloL (COREG) 25 mg tablet TAKE 1 TABLET BY MOUTH TWICE A DAY WITH MEALS 180 Tab 1    colchicine (MITIGARE) 0.6 mg capsule Take 1 capsule stat and repeat in 1 hour then take 1 capsule twice daily until pain is gone 60 Cap 3    MISCELLANEOUS MEDICAL SUPPLY (COMPRESSION STOCKINGS) by Does Not Apply route. Wear while awake. Remove at HS.  LORazepam (ATIVAN) 1 mg tablet Take 1 Tab by mouth every eight (8) hours as needed. Max Daily Amount: 3 mg. Indications: ANXIETY, INSOMNIA 90 Tab 0    aspirin delayed-release 81 mg tablet Take 81 mg by mouth daily.  [DISCONTINUED] FLUoxetine (PROzac) 10 mg capsule Take 1 Cap by mouth daily. 30 Cap 1    [DISCONTINUED] spironolactone (ALDACTONE) 25 mg tablet TAKE 1 TABLET BY MOUTH ONCE DAILY 30 Tab 5    NIFEdipine ER (PROCARDIA XL) 60 mg ER tablet Take 1 Tab by mouth daily for 180 days. 90 Tab 1     No current facility-administered medications on file prior to visit.       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     Cardiomyopathy ([facility]     [facility]congestive heart failure) ([facility])     CVA (cerebral infarction)     Depression     ED (erectile dysfunction)     Gastritis     Gout     HTN (hypertension)     Hyperlipemia, mixed     Hypoxia     Kidney disease, chronic, stage III (moderate, EGFR 30-59 ml/min) ([facility])     Pre-diabetes     Sleep apnea     Vitamin D deficiency      Past Surgical History: Procedure Laterality Date    HX ACL RECONSTRUCTION      HX APPENDECTOMY  2004    became septic from a rupture     Family History   Problem Relation Age of Onset    No Known Problems Mother     Hypertension Father     Diabetes Father    Rohan Salm Cancer Father         prostate    No Known Problems Sister     Hypertension Maternal Grandmother     No Known Problems Maternal Grandfather     Alcohol abuse Paternal Grandmother     Bipolar Disorder Paternal Grandmother     No Known Problems Paternal Grandfather      Social History     Socioeconomic History    Marital status:      Spouse name: Not on file    Number of children: Not on file    Years of education: Not on file    Highest education level: Not on file   Occupational History    Not on file   Social Needs    Financial resource strain: Not on file    Food insecurity     Worry: Not on file     Inability: Not on file    Transportation needs     Medical: Not on file     Non-medical: Not on file   Tobacco Use    Smoking status: Never Smoker    Smokeless tobacco: Never Used   Substance and Sexual Activity    Alcohol use: No     Comment: holidays/ occasions    Drug use: No    Sexual activity: Yes     Partners: Female   Lifestyle    Physical activity     Days per week: Not on file     Minutes per session: Not on file    Stress: Not on file   Relationships    Social connections     Talks on phone: Not on file     Gets together: Not on file     Attends Anabaptist service: Not on file     Active member of club or organization: Not on file     Attends meetings of clubs or organizations: Not on file     Relationship status: Not on file    Intimate partner violence     Fear of current or ex partner: Not on file     Emotionally abused: Not on file     Physically abused: Not on file     Forced sexual activity: Not on file   Other Topics Concern     Service Yes     Comment: [employer]    Blood Transfusions No    Caffeine Concern No    Occupational Exposure No    Hobby Hazards No    Sleep Concern No     Comment: sleep apnea, has cpap    Stress Concern Yes     Comment: family related    Weight Concern No     Comment: Pt has lost 20lbs but is wanting to lose more    Special Diet Yes     Comment: no salt    Back Care No    Exercise No    Bike Helmet No    Seat Belt Yes    Self-Exams Yes   Social History Narrative    Not on file       Review of Systems   Constitutional: Negative. Eyes: Negative. Respiratory: Negative. Negative for shortness of breath. Cardiovascular: Negative. Negative for chest pain and orthopnea. Gastrointestinal: Negative for abdominal pain. Musculoskeletal: Negative. Neurological: Negative. Negative for dizziness and headaches. Endo/Heme/Allergies: Negative. Psychiatric/Behavioral: Negative. There were no vitals taken for this visit. Physical Exam  Nursing note reviewed. Constitutional:       Appearance: He is well-developed. HENT:      Head: Normocephalic and atraumatic. Pulmonary:      Effort: Pulmonary effort is normal. No respiratory distress. Musculoskeletal: Normal range of motion. General: No tenderness. Skin:     General: Skin is dry. Coloration: Skin is not pale. Findings: No erythema or rash. Neurological:      Mental Status: He is alert and oriented to person, place, and time. Cranial Nerves: No cranial nerve deficit. Coordination: Coordination normal.   Psychiatric:         Behavior: Behavior normal.         Thought Content: Thought content normal.         ASSESSMENT and PLAN    ICD-10-CM ICD-9-CM    1. Stage 3b chronic kidney disease  C95.33 129.6 METABOLIC PANEL, COMPREHENSIVE      URINALYSIS W/ RFLX MICROSCOPIC   2. Hyperlipidemia, unspecified hyperlipidemia type  O41.4 237.0 METABOLIC PANEL, COMPREHENSIVE      LIPID PANEL   3.  Congestive heart failure, unspecified HF chronicity, unspecified heart failure type (HCC)  I50.9 428.0 METABOLIC PANEL, COMPREHENSIVE   4. Essential hypertension  I10 401.9    We discussed the expected course, resolution and complications of the diagnosis(es) in detail. Medication risks, benefits, costs, interactions, and alternatives were discussed as indicated. I advised her to contact the office if her condition worsens, changes or fails to improve as anticipated. She expressed understanding with the diagnosis(es) and plan. Pursuant to the emergency declaration under the [address] [place] [address], [facility] waiver authority and the [employer] and [employer] Act, this Virtual  Visit was conducted, with patient's consent, to reduce the patient's risk of exposure to COVID-19 and provide continuity of care for an established patient. Services were provided through a video synchronous discussion virtually to substitute for in-person clinic visit. Mychal Lewis MD      Follow-up and Dispositions    · Return in about 6 months (around 7/14/2021).

## 2021-02-04 ENCOUNTER — CLINICAL SUPPORT (OUTPATIENT)
Dept: FAMILY MEDICINE CLINIC | Age: 56
End: 2021-02-04

## 2021-02-04 ENCOUNTER — APPOINTMENT (OUTPATIENT)
Dept: FAMILY MEDICINE CLINIC | Age: 56
End: 2021-02-04

## 2021-02-04 VITALS
BODY MASS INDEX: 40.59 KG/M2 | DIASTOLIC BLOOD PRESSURE: 66 MMHG | HEIGHT: 67 IN | OXYGEN SATURATION: 96 % | WEIGHT: 258.6 LBS | HEART RATE: 63 BPM | SYSTOLIC BLOOD PRESSURE: 110 MMHG

## 2021-02-04 DIAGNOSIS — I10 ESSENTIAL HYPERTENSION: Primary | ICD-10-CM

## 2021-02-04 RX ORDER — NIFEDIPINE 60 MG/1
TABLET, EXTENDED RELEASE ORAL
Qty: 90 TAB | Refills: 1 | Status: SHIPPED | OUTPATIENT
Start: 2021-02-04 | End: 2021-07-19

## 2021-02-04 NOTE — PROGRESS NOTES
Chief Complaint   Patient presents with    Blood Pressure Check     Patient presents today for Nurse Visit BP check. Blood pressure taken manually in a sitting position with feet flat on the floor. He has a Virtual Follow up appointment with Dr. Jayashree Stafford on 2/25/2021 to discuss lab results and BP reading. He is aware we will call him sooner if she needs to make any changes.

## 2021-02-25 ENCOUNTER — VIRTUAL VISIT (OUTPATIENT)
Dept: FAMILY MEDICINE CLINIC | Age: 56
End: 2021-02-25
Payer: COMMERCIAL

## 2021-02-25 DIAGNOSIS — I50.9 CONGESTIVE HEART FAILURE, UNSPECIFIED HF CHRONICITY, UNSPECIFIED HEART FAILURE TYPE (HCC): Primary | ICD-10-CM

## 2021-02-25 DIAGNOSIS — N18.31 STAGE 3A CHRONIC KIDNEY DISEASE (HCC): ICD-10-CM

## 2021-02-25 DIAGNOSIS — I10 ESSENTIAL HYPERTENSION: ICD-10-CM

## 2021-02-25 DIAGNOSIS — E78.5 HYPERLIPIDEMIA, UNSPECIFIED HYPERLIPIDEMIA TYPE: ICD-10-CM

## 2021-02-25 PROCEDURE — 99214 OFFICE O/P EST MOD 30 MIN: CPT | Performed by: INTERNAL MEDICINE

## 2021-02-25 NOTE — PROGRESS NOTES
1. Have you been to the ER, urgent care clinic since your last visit? Hospitalized since your last visit? No    2. Have you seen or consulted any other health care providers outside of the [address] [place] [address] since your last visit? Include any pap smears or colon screening.  No    Chief Complaint   Patient presents with    Follow Up Chronic Condition    Results     labs    Hypertension    CHF    Cholesterol Problem    Chronic Kidney Disease

## 2021-02-25 NOTE — PROGRESS NOTES
HISTORY OF PRESENT ILLNESS  Jacob Álvarez is a 60 y.o. male who presents for follow-up on hypertension CHF hyperlipidemia and chronic kidney disease. Labs have been reviewed with patient. Kidney function has actually improved. Cholesterol is well controlled. Patient denies any symptoms compatible with CHF. Overall he is feeling well. Consent:  he and/or  healthcare decision maker is aware that this patient-initiated Telehealth encounter is a billable service, with coverage as determined by her insurance carrier. he is aware that she may receive a bill and has provided verbal consent to proceed: Yes    I was at home while conducting this encounter. Grace Boyd Results  The history is provided by the medical records. The problem has been gradually improving. Pertinent negatives include no chest pain, no abdominal pain, no headaches and no shortness of breath. Hypertension   The history is provided by the patient and medical records. This is a chronic problem. The problem has been rapidly improving. Pertinent negatives include no chest pain, no orthopnea, no headaches, no peripheral edema, no dizziness and no shortness of breath. There are no associated agents to hypertension. Risk factors include dyslipidemia, obesity and male gender. CHF  The history is provided by the patient and medical records. This is a chronic problem. The problem has been gradually improving. Pertinent negatives include no chest pain, no abdominal pain, no headaches and no shortness of breath. Nothing aggravates the symptoms. The symptoms are relieved by medications. Treatments tried: Lasix Spironolactone and losartan. The treatment provided significant relief. Cholesterol Problem  The history is provided by the patient and medical records. This is a chronic problem. The problem has been gradually improving. Pertinent negatives include no chest pain, no abdominal pain, no headaches and no shortness of breath.  The symptoms are aggravated by eating. The symptoms are relieved by medications. Chronic Kidney Disease  The history is provided by the patient and medical records. This is a chronic problem. The problem has been gradually improving. Pertinent negatives include no chest pain, no abdominal pain, no headaches and no shortness of breath. Nothing aggravates the symptoms. Nothing relieves the symptoms. He has tried nothing for the symptoms. Allergies   Allergen Reactions    Pcn [Penicillins] Anaphylaxis and Angioedema    Coconut Swelling     Current Outpatient Medications on File Prior to Visit   Medication Sig Dispense Refill    NIFEdipine ER (ADALAT CC) 60 mg ER tablet TAKE 1 TABLET BY MOUTH EVERY DAY 90 Tab 1    rosuvastatin (CRESTOR) 10 mg tablet TAKE 1 TABLET BY MOUTH ONCE NIGHTLY 30 Tab 1    carvediloL (COREG) 25 mg tablet TAKE 1 TABLET BY MOUTH TWICE A DAY WITH MEALS 180 Tab 1    colchicine (MITIGARE) 0.6 mg capsule Take 1 capsule stat and repeat in 1 hour then take 1 capsule twice daily until pain is gone 60 Cap 3    MISCELLANEOUS MEDICAL SUPPLY (COMPRESSION STOCKINGS) by Does Not Apply route. Wear while awake. Remove at HS.  LORazepam (ATIVAN) 1 mg tablet Take 1 Tab by mouth every eight (8) hours as needed. Max Daily Amount: 3 mg. Indications: ANXIETY, INSOMNIA 90 Tab 0    aspirin delayed-release 81 mg tablet Take 81 mg by mouth daily.  NIFEdipine ER (PROCARDIA XL) 60 mg ER tablet Take 1 Tab by mouth daily for 180 days. 90 Tab 1     No current facility-administered medications on file prior to visit.       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 ([facility]     Cardiomyopathy ([facility]     CHF (congestive heart failure) (HCC)     CVA (cerebral infarction)     Depression     ED (erectile dysfunction)     Gastritis     Gout     HTN (hypertension)     Hyperlipemia, mixed     Hypoxia     Kidney disease, chronic, stage III (moderate, EGFR 30-59 ml/min)     Pre-diabetes     Sleep apnea     Vitamin D deficiency      Past Surgical History:   Procedure Laterality Date    HX ACL RECONSTRUCTION      HX APPENDECTOMY  2004    became septic from a rupture     Family History   Problem Relation Age of Onset    No Known Problems Mother     Hypertension Father     Diabetes Father     Cancer Father         prostate    No Known Problems Sister     Hypertension Maternal Grandmother     No Known Problems Maternal Grandfather     Alcohol abuse Paternal Grandmother     Bipolar Disorder Paternal Grandmother     No Known Problems Paternal Grandfather      Social History     Socioeconomic History    Marital status:      Spouse name: Not on file    Number of children: Not on file    Years of education: Not on file    Highest education level: Not on file   Occupational History    Not on file   Social Needs    Financial resource strain: Not on file    Food insecurity     Worry: Not on file     Inability: Not on file    Transportation needs     Medical: Not on file     Non-medical: Not on file   Tobacco Use    Smoking status: Never Smoker    Smokeless tobacco: Never Used   Substance and Sexual Activity    Alcohol use: No     Comment: holidays/ occasions    Drug use: No    Sexual activity: Yes     Partners: Female   Lifestyle    Physical activity     Days per week: Not on file     Minutes per session: Not on file    Stress: Not on file   Relationships    Social connections     Talks on phone: Not on file     Gets together: Not on file     Attends [employer] service: Not on file     Active member of club or organization: Not on file     Attends meetings of clubs or organizations: Not on file     Relationship status: Not on file    Intimate partner violence     Fear of current or ex partner: Not on file     Emotionally abused: Not on file     Physically abused: Not on file     Forced sexual activity: Not on file   Other Topics Concern     Service Yes     Comment: [employer]    Blood Transfusions No    Caffeine Concern No    Occupational Exposure No    Hobby Hazards No    Sleep Concern No     Comment: sleep apnea, has cpap    Stress Concern Yes     Comment: family related    Weight Concern No     Comment: Pt has lost 20lbs but is wanting to lose more    Special Diet Yes     Comment: no salt    Back Care No    Exercise No    Bike Helmet No    Seat Belt Yes    Self-Exams Yes   Social History Narrative    Not on file         Review of Systems   Constitutional: Negative. Eyes: Negative. Respiratory: Negative. Negative for shortness of breath. Cardiovascular: Negative. Negative for chest pain and orthopnea. Gastrointestinal: Negative for abdominal pain. Musculoskeletal: Negative. Neurological: Negative. Negative for dizziness and headaches. Endo/Heme/Allergies: Negative. Psychiatric/Behavioral: Negative. There were no vitals taken for this visit. Physical Exam  Nursing note reviewed. Constitutional:       Appearance: He is well-developed. HENT:      Head: Normocephalic and atraumatic. Pulmonary:      Effort: Pulmonary effort is normal. No respiratory distress. Musculoskeletal: Normal range of motion. General: No tenderness. Skin:     General: Skin is dry. Coloration: Skin is not pale. Findings: No erythema or rash. Neurological:      Mental Status: He is alert and oriented to person, place, and time. Cranial Nerves: No cranial nerve deficit. Coordination: Coordination normal.   Psychiatric:         Behavior: Behavior normal.         Thought Content: Thought content normal.         ASSESSMENT and PLAN    ICD-10-CM ICD-9-CM    1. Congestive heart failure, unspecified HF chronicity, unspecified heart failure type ([facility]  I50.9 428.0    2. Essential hypertension  I10 401.9    3. Stage 3a chronic kidney disease  N18.31 585.3    4.  Hyperlipidemia, unspecified hyperlipidemia type  E78.5 272.4    We discussed the expected course, resolution and complications of the diagnosis(es) in detail. Medication risks, benefits, costs, interactions, and alternatives were discussed as indicated. I advised her to contact the office if her condition worsens, changes or fails to improve as anticipated. She expressed understanding with the diagnosis(es) and plan. Pursuant to the emergency declaration under the [address] [place] [address] waiver authority and the [employer] and [employer] Act, this Virtual  Visit was conducted, with patient's consent, to reduce the patient's risk of exposure to COVID-19 and provide continuity of care for an established patient. Services were provided through a video synchronous discussion virtually to substitute for in-person clinic visit. Edilberto Rubin MD      Follow-up and Dispositions    · Return in about 6 months (around 8/25/2021).

## 2021-05-07 RX ORDER — FLUOXETINE 10 MG/1
CAPSULE ORAL
Qty: 90 CAP | Refills: 1 | Status: SHIPPED | OUTPATIENT
Start: 2021-05-07 | End: 2021-07-30 | Stop reason: SDUPTHER

## 2021-07-19 RX ORDER — NIFEDIPINE 60 MG/1
TABLET, EXTENDED RELEASE ORAL
Qty: 90 TABLET | Refills: 1 | Status: SHIPPED | OUTPATIENT
Start: 2021-07-19 | End: 2021-07-30 | Stop reason: SDUPTHER

## 2021-07-19 RX ORDER — COLCHICINE 0.6 MG/1
CAPSULE ORAL
Qty: 60 CAPSULE | Refills: 3 | Status: SHIPPED | OUTPATIENT
Start: 2021-07-19 | End: 2021-07-30 | Stop reason: SDUPTHER

## 2021-07-30 ENCOUNTER — OFFICE VISIT (OUTPATIENT)
Dept: FAMILY MEDICINE CLINIC | Age: 56
End: 2021-07-30
Payer: COMMERCIAL

## 2021-07-30 VITALS
HEIGHT: 67 IN | RESPIRATION RATE: 22 BRPM | HEART RATE: 70 BPM | SYSTOLIC BLOOD PRESSURE: 132 MMHG | DIASTOLIC BLOOD PRESSURE: 82 MMHG | WEIGHT: 268 LBS | OXYGEN SATURATION: 96 % | TEMPERATURE: 98.1 F | BODY MASS INDEX: 42.06 KG/M2

## 2021-07-30 DIAGNOSIS — Z12.11 SCREENING FOR COLON CANCER: ICD-10-CM

## 2021-07-30 DIAGNOSIS — Z80.0 FAMILY HISTORY OF COLON CANCER: ICD-10-CM

## 2021-07-30 DIAGNOSIS — I10 ESSENTIAL HYPERTENSION: ICD-10-CM

## 2021-07-30 DIAGNOSIS — I50.22 CHRONIC SYSTOLIC CONGESTIVE HEART FAILURE (HCC): Primary | ICD-10-CM

## 2021-07-30 DIAGNOSIS — E11.22 TYPE 2 DIABETES MELLITUS WITH CHRONIC KIDNEY DISEASE, WITHOUT LONG-TERM CURRENT USE OF INSULIN, UNSPECIFIED CKD STAGE (HCC): ICD-10-CM

## 2021-07-30 DIAGNOSIS — N18.30 STAGE 3 CHRONIC KIDNEY DISEASE, UNSPECIFIED WHETHER STAGE 3A OR 3B CKD (HCC): ICD-10-CM

## 2021-07-30 DIAGNOSIS — R03.1 LOW BLOOD PRESSURE READING: ICD-10-CM

## 2021-07-30 DIAGNOSIS — M10.072 IDIOPATHIC GOUT OF LEFT ANKLE, UNSPECIFIED CHRONICITY: ICD-10-CM

## 2021-07-30 DIAGNOSIS — F31.62 BIPOLAR DISORDER, CURRENT EPISODE MIXED, MODERATE (HCC): ICD-10-CM

## 2021-07-30 DIAGNOSIS — F41.9 ANXIETY: ICD-10-CM

## 2021-07-30 DIAGNOSIS — E78.5 HYPERLIPIDEMIA, UNSPECIFIED HYPERLIPIDEMIA TYPE: ICD-10-CM

## 2021-07-30 DIAGNOSIS — I25.5 ISCHEMIC CARDIOMYOPATHY: ICD-10-CM

## 2021-07-30 PROBLEM — F98.8 ADD (ATTENTION DEFICIT DISORDER): Status: ACTIVE | Noted: 2021-07-30

## 2021-07-30 PROCEDURE — 99204 OFFICE O/P NEW MOD 45 MIN: CPT | Performed by: NURSE PRACTITIONER

## 2021-07-30 RX ORDER — NIFEDIPINE 60 MG/1
TABLET, EXTENDED RELEASE ORAL
Qty: 90 TABLET | Refills: 1 | Status: SHIPPED | OUTPATIENT
Start: 2021-07-30 | End: 2022-03-24 | Stop reason: SDUPTHER

## 2021-07-30 RX ORDER — ROSUVASTATIN CALCIUM 10 MG/1
TABLET, COATED ORAL
Qty: 90 TABLET | Refills: 1 | Status: SHIPPED | OUTPATIENT
Start: 2021-07-30 | End: 2022-04-25 | Stop reason: SDUPTHER

## 2021-07-30 RX ORDER — CARVEDILOL 25 MG/1
TABLET ORAL
Qty: 180 TABLET | Refills: 0 | Status: SHIPPED | OUTPATIENT
Start: 2021-07-30 | End: 2021-10-25 | Stop reason: SDUPTHER

## 2021-07-30 RX ORDER — COLCHICINE 0.6 MG/1
CAPSULE ORAL
Qty: 60 CAPSULE | Refills: 3 | Status: SHIPPED | OUTPATIENT
Start: 2021-07-30 | End: 2022-06-27 | Stop reason: ALTCHOICE

## 2021-07-30 RX ORDER — ASPIRIN 81 MG/1
81 TABLET ORAL DAILY
Qty: 90 TABLET | Refills: 1 | Status: SHIPPED | OUTPATIENT
Start: 2021-07-30

## 2021-07-30 RX ORDER — LORAZEPAM 1 MG/1
1 TABLET ORAL
Qty: 30 TABLET | Refills: 0 | Status: SHIPPED | OUTPATIENT
Start: 2021-07-30

## 2021-07-30 RX ORDER — FLUOXETINE 10 MG/1
CAPSULE ORAL
Qty: 90 CAPSULE | Refills: 1 | Status: SHIPPED | OUTPATIENT
Start: 2021-07-30 | End: 2022-03-24 | Stop reason: SDUPTHER

## 2021-07-30 NOTE — PROGRESS NOTES
Exam Room 17    1. Have you been to the ER, urgent care clinic since your last visit? Hospitalized since your last visit? No    2. Have you seen or consulted any other health care providers outside of the [address] [place] [address] since your last visit? Include any pap smears or colon screening.  No    .  Health Maintenance Due   Topic Date Due    Hepatitis C Screening  Never done    COVID-19 Vaccine (1) Never done    Shingrix Vaccine Age 50> (1 of 2) Never done    A1C test (Diabetic or Prediabetic)  11/29/2018    Colorectal Cancer Screening Combo  02/06/2020

## 2021-07-30 NOTE — PROGRESS NOTES
The Tello Griggs 60 y.o. male presents today for:    Chief Complaint   Patient presents with    Follow Up Chronic Condition     crcs    Establish Care     Patient here to establish care and sees cardiologist and nephrologist.    Review of Systems   Constitutional: Negative. Negative for weight loss. HENT: Negative. Eyes: Negative. Respiratory: Negative. Negative for shortness of breath and wheezing. Cardiovascular: Negative. Negative for chest pain, orthopnea and leg swelling. Gastrointestinal: Negative. Negative for abdominal pain and blood in stool. Genitourinary: Negative. Musculoskeletal: Negative. Skin: Negative. Neurological: Negative. Negative for dizziness and headaches. Endo/Heme/Allergies: Negative. Psychiatric/Behavioral: Positive for depression. Negative for hallucinations, substance abuse and suicidal ideas. The patient is nervous/anxious and has insomnia. Health Maintenance Due   Topic Date Due    Colorectal Cancer Screening Combo  02/06/2020        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 ([facility]     Cardiomyopathy ([facility]     CHF (congestive heart failure) (HCC)     CVA (cerebral infarction)     Depression     ED (erectile dysfunction)     Gastritis     Gout     HTN (hypertension)     Hyperlipemia, mixed     Hypoxia     Kidney disease, chronic, stage III (moderate, EGFR 30-59 ml/min) (HCC)     Pre-diabetes     Sleep apnea     Vitamin D deficiency        Physical Exam  Vitals and nursing note reviewed. Constitutional:       Appearance: He is obese. Neck:      Vascular: No carotid bruit. Cardiovascular:      Rate and Rhythm: Normal rate and regular rhythm. Pulses: Normal pulses. Heart sounds: No murmur heard. Pulmonary:      Effort: Pulmonary effort is normal.      Breath sounds: Normal breath sounds. Abdominal:      General: Abdomen is flat. There is no distension. Tenderness:  There is no abdominal tenderness. Musculoskeletal:         General: No swelling or tenderness. Normal range of motion. Cervical back: Normal range of motion and neck supple. No rigidity or tenderness. Right lower leg: No edema. Left lower leg: No edema. Lymphadenopathy:      Cervical: No cervical adenopathy. Skin:     General: Skin is warm. Capillary Refill: Capillary refill takes less than 2 seconds. Neurological:      Mental Status: He is alert and oriented to person, place, and time. Mental status is at baseline. Psychiatric:         Mood and Affect: Mood is anxious. ASSESSMENT and PLAN    ICD-10-CM ICD-9-CM    1. Chronic systolic congestive heart failure (HCC)  I50.22 428.22 carvediloL (COREG) 25 mg tablet     428.0 NIFEdipine ER (ADALAT CC) 60 mg ER tablet   2. Bipolar disorder, current episode mixed, moderate ([facility])  F31.62 296.62 FLUoxetine (PROzac) 10 mg capsule      LORazepam (ATIVAN) 1 mg tablet      REFERRAL TO PSYCHIATRY   3. Stage 3 chronic kidney disease, unspecified whether stage 3a or 3b CKD ([facility])  N18.30 585.3    4. Ischemic cardiomyopathy  I25.5 414.8 carvediloL (COREG) 25 mg tablet      aspirin delayed-release 81 mg tablet   5. Type 2 diabetes mellitus with chronic kidney disease, without long-term current use of insulin, unspecified CKD stage ([facility])  E11.22 250.40 AMB POC HEMOGLOBIN A1C     585.9    6. Screening for colon cancer  Z12.11 V76.51 REFERRAL TO GASTROENTEROLOGY   7. Family history of colon cancer  Z80.0 V16.0    8. Hyperlipidemia, unspecified hyperlipidemia type  E78.5 272.4 rosuvastatin (CRESTOR) 10 mg tablet   9. Essential hypertension  I10 401.9 carvediloL (COREG) 25 mg tablet   10. Idiopathic gout of left ankle, unspecified chronicity  M10.072 274.00    11. Anxiety  F41.9 300.00 REFERRAL TO PSYCHIATRY   12. Low blood pressure reading  R03.1 796.3      Follow-up and Dispositions    · Return in about 3 months (around 10/30/2021).        the following changes in treatment are made: decreased dose of Coreg due to low BP. Referred patient to new psychiatrist since previous [facility] closed.  Medication refills provided for 90 days except for Lorazepam.  lab results and schedule of future lab studies reviewed with patient  reviewed diet, exercise and weight control  cardiovascular risk and specific lipid/LDL goals reviewed  reviewed medications and side effects in detail    Nicolas Luther NP

## 2021-08-03 PROBLEM — I10 HTN (HYPERTENSION): Status: RESOLVED | Noted: 2021-08-03 | Resolved: 2021-08-03

## 2021-08-16 ENCOUNTER — CLINICAL SUPPORT (OUTPATIENT)
Dept: FAMILY MEDICINE CLINIC | Age: 56
End: 2021-08-16

## 2021-08-16 VITALS
HEART RATE: 65 BPM | SYSTOLIC BLOOD PRESSURE: 114 MMHG | BODY MASS INDEX: 42.03 KG/M2 | OXYGEN SATURATION: 98 % | DIASTOLIC BLOOD PRESSURE: 72 MMHG | WEIGHT: 267.8 LBS | RESPIRATION RATE: 22 BRPM | HEIGHT: 67 IN | TEMPERATURE: 98 F

## 2021-08-16 DIAGNOSIS — Z01.30 BP CHECK: Primary | ICD-10-CM

## 2021-08-16 NOTE — PROGRESS NOTES
Per JOSE Carvajal instructions patient presents today for a blood pressure check. Patient seated and resting for 15 minutes with both feet flat on the floor. Blood pressure taken and documented. Reported blood pressure to JOSE Carvajal.

## 2021-10-21 ENCOUNTER — OFFICE VISIT (OUTPATIENT)
Dept: FAMILY MEDICINE CLINIC | Age: 56
End: 2021-10-21
Payer: COMMERCIAL

## 2021-10-21 VITALS
RESPIRATION RATE: 22 BRPM | DIASTOLIC BLOOD PRESSURE: 60 MMHG | SYSTOLIC BLOOD PRESSURE: 112 MMHG | WEIGHT: 261.2 LBS | TEMPERATURE: 98.2 F | OXYGEN SATURATION: 95 % | HEART RATE: 76 BPM | HEIGHT: 67 IN | BODY MASS INDEX: 41 KG/M2

## 2021-10-21 DIAGNOSIS — F31.62 BIPOLAR DISORDER, CURRENT EPISODE MIXED, MODERATE (HCC): ICD-10-CM

## 2021-10-21 DIAGNOSIS — Z80.0 FAMILY HISTORY OF COLON CANCER: ICD-10-CM

## 2021-10-21 DIAGNOSIS — B35.1 TOENAIL FUNGUS: ICD-10-CM

## 2021-10-21 DIAGNOSIS — E78.5 HYPERLIPIDEMIA, UNSPECIFIED HYPERLIPIDEMIA TYPE: ICD-10-CM

## 2021-10-21 DIAGNOSIS — E11.22 TYPE 2 DIABETES MELLITUS WITH CHRONIC KIDNEY DISEASE, WITHOUT LONG-TERM CURRENT USE OF INSULIN, UNSPECIFIED CKD STAGE (HCC): ICD-10-CM

## 2021-10-21 DIAGNOSIS — I10 ESSENTIAL HYPERTENSION: ICD-10-CM

## 2021-10-21 DIAGNOSIS — N18.30 STAGE 3 CHRONIC KIDNEY DISEASE, UNSPECIFIED WHETHER STAGE 3A OR 3B CKD (HCC): Primary | ICD-10-CM

## 2021-10-21 DIAGNOSIS — Z12.11 SCREENING FOR COLON CANCER: ICD-10-CM

## 2021-10-21 DIAGNOSIS — Z12.11 COLON CANCER SCREENING: ICD-10-CM

## 2021-10-21 PROCEDURE — 99214 OFFICE O/P EST MOD 30 MIN: CPT | Performed by: NURSE PRACTITIONER

## 2021-10-21 NOTE — PROGRESS NOTES
The Shawna Garcia 60 y.o. male presents today for:    Chief Complaint   Patient presents with    Follow-up    Hypertension     Quentin Dennis NP present with pt's permission    Has psych appt 11/11/2021  [facility]. [facility] about 2 years ago  Shingles shot this month    Colonoscopy 2015; 5 years ago. Negative occult blood 2019      Review of Systems   Constitutional: Negative. Negative for weight loss. HENT: Negative. Eyes: Negative. Respiratory: Negative. Negative for shortness of breath and wheezing. Cardiovascular: Negative. Negative for chest pain, orthopnea and leg swelling. Gastrointestinal: Negative. Negative for abdominal pain and blood in stool. Genitourinary: Negative. Musculoskeletal: Negative. Skin: Negative. Neurological: Negative. Negative for dizziness and headaches. Endo/Heme/Allergies: Negative. Psychiatric/Behavioral: Positive for depression. Negative for hallucinations, substance abuse and suicidal ideas. The patient is nervous/anxious and has insomnia. Health Maintenance Due   Topic Date Due    Eye Exam Retinal or Dilated  Never done    Colorectal Cancer Screening Combo  02/06/2020        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 ([address]     Cardiomyopathy ([address]     CHF (congestive heart failure) (HCC)     CVA (cerebral infarction)     Depression     ED (erectile dysfunction)     Gastritis     Gout     HTN (hypertension)     Hyperlipemia, mixed     Hypoxia     Kidney disease, chronic, stage III (moderate, EGFR 30-59 ml/min) (HCC)     Pre-diabetes     Sleep apnea     Vitamin D deficiency      Visit Vitals  /60 (BP 1 Location: Right arm, BP Patient Position: Sitting)   Pulse 76   Temp 98.2 °F (36.8 °C) (Temporal)   Resp 22   Ht 5' 7\" (1.702 m)   Wt 261 lb 3.2 oz (118.5 kg)   SpO2 95%   BMI 40.91 kg/m²       Physical Exam  Vitals and nursing note reviewed.    Constitutional: Appearance: He is obese. Neck:      Vascular: No carotid bruit. Cardiovascular:      Rate and Rhythm: Normal rate and regular rhythm. Pulses: Normal pulses. Heart sounds: No murmur heard. Pulmonary:      Effort: Pulmonary effort is normal.      Breath sounds: Normal breath sounds. Abdominal:      General: Abdomen is flat. There is no distension. Tenderness: There is no abdominal tenderness. Musculoskeletal:         General: No swelling or tenderness. Normal range of motion. Cervical back: Normal range of motion and neck supple. No rigidity or tenderness. Right lower leg: No edema. Left lower leg: No edema. Lymphadenopathy:      Cervical: No cervical adenopathy. Skin:     General: Skin is warm. Capillary Refill: Capillary refill takes less than 2 seconds. Neurological:      Mental Status: He is alert and oriented to person, place, and time. Mental status is at baseline. Psychiatric:         Mood and Affect: Mood is anxious. ASSESSMENT and PLAN    ICD-10-CM ICD-9-CM    1. Stage 3 chronic kidney disease, unspecified whether stage 3a or 3b CKD (HCC)  N18.30 585.3    2. Bipolar disorder, current episode mixed, moderate ([facility]  F31.62 296.62    3. Type 2 diabetes mellitus with chronic kidney disease, without long-term current use of insulin, unspecified CKD stage (HCC)  E11.22 250.40 MICROALBUMIN, UR, RAND W/ MICROALB/CREAT RATIO     585.9  DIABETES FOOT EXAM      MICROALBUMIN, UR, RAND W/ MICROALB/CREAT RATIO   4. Family history of colon cancer  Z80.0 V16.0 REFERRAL TO GASTROENTEROLOGY   5. Screening for colon cancer  Z12.11 V76.51    6. Essential hypertension  I10 401.9 MICROALBUMIN, UR, RAND W/ MICROALB/CREAT RATIO      MICROALBUMIN, UR, RAND W/ MICROALB/CREAT RATIO   7. Hyperlipidemia, unspecified hyperlipidemia type  E78.5 272.4    8. Colon cancer screening  Z12.11 V76.51 REFERRAL TO GASTROENTEROLOGY   9.  Toenail fungus  B35.1 110.1      Orders Placed This Encounter    MICROALBUMIN, UR, RAND W/ MICROALB/CREAT RATIO    REFERRAL TO GASTROENTEROLOGY    HM DIABETES FOOT EXAM     reviewed diet, exercise and weight control  cardiovascular risk and specific lipid/LDL goals reviewed  reviewed medications and side effects in detail     Right great and 2nd toe fungus and left great toe; uses pen lac BID  Will call back for podiatry name     Fredy Alba NP

## 2021-10-21 NOTE — PROGRESS NOTES
Patient states he had the Flu vaccine at [facility] in October 2021. Craig Hanson presents today for   Chief Complaint   Patient presents with    Follow-up    Hypertension       Is someone accompanying this pt? no    Is the patient using any DME equipment during OV? no    Depression Screening:  3 most recent PHQ Screens 10/21/2021   PHQ Not Done -   Little interest or pleasure in doing things Not at all   Feeling down, depressed, irritable, or hopeless Not at all   Total Score PHQ 2 0   Thoughts of being better off dead, or hurting yourself in some way -       Learning Assessment:  Learning Assessment 6/12/2019   PRIMARY LEARNER Patient   PRIMARY LANGUAGE ENGLISH   LEARNER PREFERENCE PRIMARY -     -   ANSWERED BY -   RELATIONSHIP -       Health Maintenance reviewed and discussed and ordered per Provider. Health Maintenance Due   Topic Date Due    Foot Exam Q1  Never done    Eye Exam Retinal or Dilated  Never done    MICROALBUMIN Q1  09/25/2018    Colorectal Cancer Screening Combo  02/06/2020    Flu Vaccine (1) 09/01/2021   . Coordination of Care:  1. Have you been to the ER, urgent care clinic since your last visit? Hospitalized since your last visit? no    2. Have you seen or consulted any other health care providers outside of the [address] [place] [address] since your last visit? Include any pap smears or colon screening.  No    Health Maintenance Due   Topic Date Due    Foot Exam Q1  Never done    Eye Exam Retinal or Dilated  Never done    MICROALBUMIN Q1  09/25/2018    Colorectal Cancer Screening Combo  02/06/2020    Flu Vaccine (1) 09/01/2021

## 2021-10-22 LAB
CREATININE, URINE: 402 MG/DL
MICROALB/CREAT RATIO, 140286: 13.2 (ref 0–30)
MICROALBUMIN,URINE RANDOM 140054: 53 MG/L (ref 0.1–17)

## 2021-10-25 DIAGNOSIS — I10 ESSENTIAL HYPERTENSION: ICD-10-CM

## 2021-10-25 DIAGNOSIS — I50.22 CHRONIC SYSTOLIC CONGESTIVE HEART FAILURE (HCC): ICD-10-CM

## 2021-10-25 DIAGNOSIS — I25.5 ISCHEMIC CARDIOMYOPATHY: ICD-10-CM

## 2021-10-26 RX ORDER — CARVEDILOL 25 MG/1
TABLET ORAL
Qty: 180 TABLET | Refills: 0 | Status: SHIPPED | OUTPATIENT
Start: 2021-10-26 | End: 2021-12-14 | Stop reason: SDUPTHER

## 2021-12-14 DIAGNOSIS — I50.22 CHRONIC SYSTOLIC CONGESTIVE HEART FAILURE (HCC): ICD-10-CM

## 2021-12-14 DIAGNOSIS — I10 ESSENTIAL HYPERTENSION: ICD-10-CM

## 2021-12-14 DIAGNOSIS — I25.5 ISCHEMIC CARDIOMYOPATHY: ICD-10-CM

## 2021-12-14 RX ORDER — CARVEDILOL 25 MG/1
TABLET ORAL
Qty: 180 TABLET | Refills: 0 | Status: SHIPPED | OUTPATIENT
Start: 2021-12-14 | End: 2022-06-27 | Stop reason: SDUPTHER

## 2022-01-12 ENCOUNTER — TELEPHONE (OUTPATIENT)
Dept: FAMILY MEDICINE CLINIC | Age: 57
End: 2022-01-12

## 2022-01-12 NOTE — TELEPHONE ENCOUNTER
----- Message from Cielo Romero sent at 1/12/2022 11:14 AM EST -----  Subject: Message to Provider    QUESTIONS  Information for Provider? Pt took a at home GI kit that was negative about   2 weeks, his insurance company sent it out to him. Pt wants to know if he   still needs to go to the GI doc that he was referred to for a colonoscopy? Please call to advise.   ---------------------------------------------------------------------------  --------------  CALL BACK INFO  What is the best way for the office to contact you? OK to leave message on   voicemail  Preferred Call Back Phone Number? [phone]  ---------------------------------------------------------------------------  --------------  SCRIPT ANSWERS  Relationship to Patient?  Self

## 2022-01-17 NOTE — TELEPHONE ENCOUNTER
Spoke to patient, he states he will bring them to the office if he can find them when he come for his appointment.  Patient was inform that JOSE Ellington will be informed

## 2022-01-20 ENCOUNTER — VIRTUAL VISIT (OUTPATIENT)
Dept: FAMILY MEDICINE CLINIC | Age: 57
End: 2022-01-20
Payer: COMMERCIAL

## 2022-01-20 DIAGNOSIS — E11.22 TYPE 2 DIABETES MELLITUS WITH CHRONIC KIDNEY DISEASE, WITHOUT LONG-TERM CURRENT USE OF INSULIN, UNSPECIFIED CKD STAGE (HCC): ICD-10-CM

## 2022-01-20 DIAGNOSIS — I10 ESSENTIAL HYPERTENSION: ICD-10-CM

## 2022-01-20 DIAGNOSIS — I50.22 CHRONIC SYSTOLIC CONGESTIVE HEART FAILURE (HCC): Primary | ICD-10-CM

## 2022-01-20 DIAGNOSIS — N18.30 STAGE 3 CHRONIC KIDNEY DISEASE, UNSPECIFIED WHETHER STAGE 3A OR 3B CKD (HCC): ICD-10-CM

## 2022-01-20 DIAGNOSIS — F31.62 BIPOLAR DISORDER, CURRENT EPISODE MIXED, MODERATE (HCC): ICD-10-CM

## 2022-01-20 PROCEDURE — 99214 OFFICE O/P EST MOD 30 MIN: CPT | Performed by: NURSE PRACTITIONER

## 2022-01-20 NOTE — PROGRESS NOTES
Patient states he took a Cologuard test in October 2020. Patient states he has had his booster but do not remember the date. Cherelle Carbajal presents today for   Chief Complaint   Patient presents with    Follow-up     3 month       Virtual/telephone visit    Depression Screening:  3 most recent PHQ Screens 10/21/2021   PHQ Not Done -   Little interest or pleasure in doing things Not at all   Feeling down, depressed, irritable, or hopeless Not at all   Total Score PHQ 2 0   Thoughts of being better off dead, or hurting yourself in some way -       Learning Assessment:  Learning Assessment 6/12/2019   PRIMARY LEARNER Patient   PRIMARY LANGUAGE ENGLISH   LEARNER PREFERENCE PRIMARY -     -   ANSWERED BY -   RELATIONSHIP -       Health Maintenance reviewed and discussed and ordered per Provider. Health Maintenance Due   Topic Date Due    Eye Exam Retinal or Dilated  Never done    Colorectal Cancer Screening Combo  02/06/2020    COVID-19 Vaccine (3 - Booster for Manuela Most series) 10/16/2021   . Coordination of Care:  1. Have you been to the ER, urgent care clinic since your last visit? Hospitalized since your last visit? no    2. Have you seen or consulted any other health care providers outside of the [address] [place] [address] since your last visit? Include any pap smears or colon screening.  no

## 2022-01-20 NOTE — PROGRESS NOTES
Carmela Somers (: 1965) is a 60 y.o. male, established patient, here for evaluation of the following chief complaint(s):   Follow-up (3 month)     Had covid booster but unsure of the date    Pt has recent negative stool test through insurance; pt referred for colonoscopy 10/2021 but has not completed. Patient wants to wait on colonoscopy. Pt seeing psychiatrist.     Pt checks BP at least 3 times a week. 110/70 average;     patient does not check blood glucoses. Jardiance for diabetes     ASSESSMENT/PLAN:  Below is the assessment and plan developed based on review of pertinent history, labs, studies, and medications. 1. Chronic systolic congestive heart failure (HCC)  2. Stage 3 chronic kidney disease, unspecified whether stage 3a or 3b CKD ([address]  3. Bipolar disorder, current episode mixed, moderate ([address]  4. Essential hypertension  5. Type 2 diabetes mellitus with chronic kidney disease, without long-term current use of insulin, unspecified CKD stage ([address]      Return in about 3 months (around 2022) for 1 week prior schedule labs . SUBJECTIVE/OBJECTIVE:  HPI    Review of Systems   Constitutional: Negative for appetite change, diaphoresis, fatigue and fever. HENT: Negative. Respiratory: Negative for chest tightness, shortness of breath and wheezing. Cardiovascular: Negative for chest pain and palpitations. Genitourinary: Negative. Negative for difficulty urinating. Musculoskeletal: Negative. Negative for back pain and neck pain. Skin: Negative. Neurological: Negative for dizziness and light-headedness. Psychiatric/Behavioral: Negative for behavioral problems, dysphoric mood and sleep disturbance. The patient is not nervous/anxious and is not hyperactive.          No data recorded     Physical Exam    [INSTRUCTIONS:  \"[x]\" Indicates a positive item  \"[]\" Indicates a negative item  -- DELETE ALL ITEMS NOT EXAMINED]    Constitutional: [x] Appears well-developed and well-nourished [x] No apparent distress      [] Abnormal -     Mental status: [x] Alert and awake  [x] Oriented to person/place/time [x] Able to follow commands    [] Abnormal -     Eyes:   EOM    [x]  Normal    [] Abnormal -   Sclera  [x]  Normal    [] Abnormal -          Discharge [x]  None visible   [] Abnormal -     HENT: [x] Normocephalic, atraumatic  [] Abnormal -   [x] Mouth/Throat: Mucous membranes are moist    External Ears [x] Normal  [] Abnormal -    Neck: [x] No visualized mass [] Abnormal -     Pulmonary/Chest: [x] Respiratory effort normal   [x] No visualized signs of difficulty breathing or respiratory distress        [] Abnormal -      Musculoskeletal:   [x] Normal gait with no signs of ataxia         [x] Normal range of motion of neck        [] Abnormal -     Neurological:        [x] No Facial Asymmetry (Cranial nerve 7 motor function) (limited exam due to video visit)          [x] No gaze palsy        [] Abnormal -          Skin:        [x] No significant exanthematous lesions or discoloration noted on facial skin         [] Abnormal -            Psychiatric:       [x] Normal Affect [] Abnormal -        [x] No Hallucinations    Other pertinent observable physical exam findings:-        On this date 01/20/2022 I have spent 5 minutes reviewing previous notes, test results and face to face (virtual) with the patient discussing the diagnosis and importance of compliance with the treatment plan as well as documenting on the day of the visitBertha Somers, was evaluated through a synchronous (real-time) audio-video encounter. The patient (or guardian if applicable) is aware that this is a billable service, which includes applicable co-pays. Verbal consent to proceed has been obtained.  The visit was conducted pursuant to the emergency declaration under the [facility], [address] [place] [address] authority and the [employer] and [employer] [facility] Act.  Patient identification was verified, and a caregiver was present when appropriate. The patient was located at home in a state where the provider was licensed to provide care. An electronic signature was used to authenticate this note.   -- Sue Aleman NP

## 2022-03-18 PROBLEM — N18.30 STAGE 3 CHRONIC KIDNEY DISEASE (HCC): Status: ACTIVE | Noted: 2018-01-30

## 2022-03-19 PROBLEM — F98.8 ADD (ATTENTION DEFICIT DISORDER): Status: ACTIVE | Noted: 2021-07-30

## 2022-03-24 DIAGNOSIS — I50.22 CHRONIC SYSTOLIC CONGESTIVE HEART FAILURE (HCC): ICD-10-CM

## 2022-03-24 DIAGNOSIS — F31.62 BIPOLAR DISORDER, CURRENT EPISODE MIXED, MODERATE (HCC): ICD-10-CM

## 2022-03-24 RX ORDER — FLUOXETINE 10 MG/1
CAPSULE ORAL
Qty: 90 CAPSULE | Refills: 1 | Status: SHIPPED | OUTPATIENT
Start: 2022-03-24 | End: 2022-06-27 | Stop reason: SDUPTHER

## 2022-03-24 RX ORDER — NIFEDIPINE 60 MG/1
TABLET, EXTENDED RELEASE ORAL
Qty: 90 TABLET | Refills: 1 | Status: SHIPPED | OUTPATIENT
Start: 2022-03-24 | End: 2022-06-27 | Stop reason: SDUPTHER

## 2022-04-21 ENCOUNTER — OFFICE VISIT (OUTPATIENT)
Dept: FAMILY MEDICINE CLINIC | Age: 57
End: 2022-04-21
Payer: COMMERCIAL

## 2022-04-21 VITALS
HEIGHT: 67 IN | HEART RATE: 65 BPM | WEIGHT: 264.4 LBS | SYSTOLIC BLOOD PRESSURE: 135 MMHG | RESPIRATION RATE: 22 BRPM | BODY MASS INDEX: 41.5 KG/M2 | DIASTOLIC BLOOD PRESSURE: 81 MMHG | OXYGEN SATURATION: 98 % | TEMPERATURE: 97.8 F

## 2022-04-21 DIAGNOSIS — I10 ESSENTIAL HYPERTENSION: ICD-10-CM

## 2022-04-21 DIAGNOSIS — F31.62 BIPOLAR DISORDER, CURRENT EPISODE MIXED, MODERATE (HCC): Primary | ICD-10-CM

## 2022-04-21 DIAGNOSIS — I50.22 CHRONIC SYSTOLIC CONGESTIVE HEART FAILURE (HCC): ICD-10-CM

## 2022-04-21 DIAGNOSIS — N18.30 STAGE 3 CHRONIC KIDNEY DISEASE, UNSPECIFIED WHETHER STAGE 3A OR 3B CKD (HCC): ICD-10-CM

## 2022-04-21 DIAGNOSIS — E11.22 TYPE 2 DIABETES MELLITUS WITH CHRONIC KIDNEY DISEASE, WITHOUT LONG-TERM CURRENT USE OF INSULIN, UNSPECIFIED CKD STAGE (HCC): ICD-10-CM

## 2022-04-21 DIAGNOSIS — E78.5 HYPERLIPIDEMIA, UNSPECIFIED HYPERLIPIDEMIA TYPE: ICD-10-CM

## 2022-04-21 PROCEDURE — 3044F HG A1C LEVEL LT 7.0%: CPT | Performed by: NURSE PRACTITIONER

## 2022-04-21 PROCEDURE — 99214 OFFICE O/P EST MOD 30 MIN: CPT | Performed by: NURSE PRACTITIONER

## 2022-04-21 RX ORDER — ALLOPURINOL 100 MG/1
100 TABLET ORAL
COMMUNITY
Start: 2022-03-04 | End: 2022-06-27 | Stop reason: SDUPTHER

## 2022-04-21 RX ORDER — ZIPRASIDONE HYDROCHLORIDE 60 MG/1
CAPSULE ORAL
COMMUNITY
Start: 2022-03-01 | End: 2022-11-04 | Stop reason: ALTCHOICE

## 2022-04-21 RX ORDER — EMPAGLIFLOZIN 10 MG/1
10 TABLET, FILM COATED ORAL DAILY
COMMUNITY
Start: 2022-04-16 | End: 2022-06-27 | Stop reason: SDUPTHER

## 2022-04-21 NOTE — PROGRESS NOTES
The Mary Lou Nassar 60 y.o. male presents today for:    Chief Complaint   Patient presents with    Follow-up     3 month     Hypertension     Sees psychiatry, cardiology     Tardive dyskinesis     Review of Systems   Constitutional: Negative. Negative for chills, fever and malaise/fatigue. Respiratory: Negative for cough, hemoptysis and wheezing. Cardiovascular: Negative for chest pain, palpitations and leg swelling. Gastrointestinal: Negative for abdominal pain, blood in stool, constipation and diarrhea. Skin: Negative. Neurological: Negative for dizziness and headaches. Endo/Heme/Allergies: Negative. Psychiatric/Behavioral: Negative for depression and suicidal ideas. The patient is not nervous/anxious and does not have insomnia. Health Maintenance Due   Topic Date Due    Eye Exam Retinal or Dilated  Never done    Pneumococcal 0-64 years (2 - PCV) 05/13/2013        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     Cardiomyopathy ([facility]     [facility]congest[facility] failure) ([facility]     CVA (cerebral infarction)     Depression     ED (erectile dysfunction)     Gastritis     Gout     HTN (hypertension)     Hyperlipemia, mixed     Hypoxia     Kidney disease, chronic, stage III (moderate, EGFR 30-59 ml/min) ([facility])     Pre-diabetes     Sleep apnea     Vitamin D deficiency        Physical Exam  Vitals and nursing note reviewed. Constitutional:       Appearance: He is obese. Neck:      Vascular: No carotid bruit. Cardiovascular:      Rate and Rhythm: Normal rate and regular rhythm. Pulses: Normal pulses. Heart sounds: No murmur heard. Pulmonary:      Effort: Pulmonary effort is normal.      Breath sounds: Normal breath sounds. Abdominal:      General: Abdomen is flat. There is no distension. Tenderness: There is no abdominal tenderness. Musculoskeletal:         General: No swelling or tenderness. Normal range of motion. Cervical back: Normal range of motion and neck supple. No rigidity or tenderness. Right lower leg: No edema. Left lower leg: No edema. Lymphadenopathy:      Cervical: No cervical adenopathy. Skin:     General: Skin is warm. Capillary Refill: Capillary refill takes less than 2 seconds. Neurological:      Mental Status: He is alert and oriented to person, place, and time. Mental status is at baseline. Psychiatric:         Mood and Affect: Mood is anxious. Visit Vitals  /81 (BP 1 Location: Left upper arm, BP Patient Position: Sitting)   Pulse 65   Temp 97.8 °F (36.6 °C) (Temporal)   Resp 22   Ht 5' 7\" (1.702 m)   Wt 264 lb 6.4 oz (119.9 kg)   SpO2 98%   BMI 41.41 kg/m²       Current Outpatient Medications:     rosuvastatin (CRESTOR) 10 mg tablet, TAKE 1 TABLET BY MOUTH ONCE NIGHTLY, Disp: 90 Tablet, Rfl: 1    Jardiance 10 mg tablet, 10 mg daily. , Disp: , Rfl:     allopurinoL (ZYLOPRIM) 100 mg tablet, Take 100 mg by mouth nightly., Disp: , Rfl:     ziprasidone hcl (GEODON) 60 mg capsule, TAKE 1 CAPSULE BY MOUTH TWICE A DAY WITH MEALS, Disp: , Rfl:     FLUoxetine (PROzac) 10 mg capsule, TAKE 1 CAPSULE BY MOUTH EVERY DAY, Disp: 90 Capsule, Rfl: 1    NIFEdipine ER (ADALAT CC) 60 mg ER tablet, TAKE 1 TABLET DAILY. , Disp: 90 Tablet, Rfl: 1    carvediloL (COREG) 25 mg tablet, TAKE 1 TABLET BY MOUTH ONCE A DAY WITH MEALS, Disp: 180 Tablet, Rfl: 0    aspirin delayed-release 81 mg tablet, Take 1 Tablet by mouth daily. , Disp: 90 Tablet, Rfl: 1    colchicine (MITIGARE) 0.6 mg capsule, TAKE 1 CAPSULE TWICE DAILY AS NEEDED FOR PAIN., Disp: 60 Capsule, Rfl: 3    LORazepam (ATIVAN) 1 mg tablet, Take 1 Tablet by mouth every eight (8) hours as needed for Anxiety. Max Daily Amount: 3 mg. Indications: anxious, difficulty sleeping, Disp: 30 Tablet, Rfl: 0  ASSESSMENT and PLAN    ICD-10-CM ICD-9-CM    1. Bipolar disorder, current episode mixed, moderate ([facility]  F31.62 296.62    2.  Chronic systolic congestive heart failure (HCC)  I50.22 428.22      428.0    3. Stage 3 chronic kidney disease, unspecified whether stage 3a or 3b CKD ([facility])  N18.30 585.3    4. Type 2 diabetes mellitus with chronic kidney disease, without long-term current use of insulin, unspecified CKD stage ([facility])  E11.22 250.40 HEMOGLOBIN A1C WITH EAG     585.9 HEMOGLOBIN A1C WITH EAG   5. Essential hypertension  S42 412.4 METABOLIC PANEL, COMPREHENSIVE      METABOLIC PANEL, COMPREHENSIVE   6. Hyperlipidemia, unspecified hyperlipidemia type  E78.5 272.4 LIPID PANEL      LIPID PANEL     Follow-up and Dispositions    · Return in about 3 months (around 7/21/2022).        lab results and schedule of future lab studies reviewed with patient  reviewed diet, exercise and weight control  cardiovascular risk and specific lipid/LDL goals reviewed  reviewed medications and side effects in detail    Virgen Carr NP

## 2022-04-21 NOTE — PROGRESS NOTES
Darya Kingston presents today for   Chief Complaint   Patient presents with    Follow-up     3 month     Hypertension       Is someone accompanying this pt? no    Is the patient using any DME equipment during OV? no    Depression Screening:  3 most recent PHQ Screens 4/21/2022   PHQ Not Done -   Little interest or pleasure in doing things Not at all   Feeling down, depressed, irritable, or hopeless Not at all   Total Score PHQ 2 0   Thoughts of being better off dead, or hurting yourself in some way -       Learning Assessment:  Learning Assessment 6/12/2019   PRIMARY LEARNER Patient   PRIMARY LANGUAGE ENGLISH   LEARNER PREFERENCE PRIMARY -     -   ANSWERED BY -   RELATIONSHIP -       Health Maintenance reviewed and discussed and ordered per Provider. Health Maintenance Due   Topic Date Due    Eye Exam Retinal or Dilated  Never done    Pneumococcal 0-64 years (2 - PCV) 05/13/2013    Lipid Screen  02/04/2022   . Coordination of Care:  1. Have you been to the ER, urgent care clinic since your last visit? Hospitalized since your last visit? no    2. Have you seen or consulted any other health care providers outside of the [address] [place] [address] since your last visit? Include any pap smears or colon screening. No      3. For patients aged 39-70: Has the patient had a colonoscopy / FIT/ Cologuard? Yes - no Care Gap present      If the patient is female:    4. For patients aged 41-77: Has the patient had a mammogram within the past 2 years? NA - based on age or sex      11. For patients aged 21-65: Has the patient had a pap smear?  NA - based on age or sex

## 2022-04-22 LAB
A-G RATIO,AGRAT: 1.3 RATIO (ref 1.1–2.6)
ALBUMIN SERPL-MCNC: 4 G/DL (ref 3.5–5)
ALP SERPL-CCNC: 80 U/L (ref 25–115)
ALT SERPL-CCNC: 21 U/L (ref 5–40)
ANION GAP SERPL CALC-SCNC: 10 MMOL/L (ref 3–15)
AST SERPL W P-5'-P-CCNC: 14 U/L (ref 10–37)
AVG GLU, 10930: 120 MG/DL (ref 91–123)
BILIRUB SERPL-MCNC: 0.2 MG/DL (ref 0.2–1.2)
BUN SERPL-MCNC: 28 MG/DL (ref 6–22)
CALCIUM SERPL-MCNC: 9 MG/DL (ref 8.4–10.5)
CHLORIDE SERPL-SCNC: 103 MMOL/L (ref 98–110)
CHOLEST SERPL-MCNC: 141 MG/DL (ref 110–200)
CO2 SERPL-SCNC: 28 MMOL/L (ref 20–32)
CREAT SERPL-MCNC: 1.7 MG/DL (ref 0.5–1.2)
GFRAA, 66117: 49.7
GFRNA, 66118: 41
GLOBULIN,GLOB: 3.1 G/DL (ref 2–4)
GLUCOSE SERPL-MCNC: 119 MG/DL (ref 70–99)
HBA1C MFR BLD HPLC: 5.8 % (ref 4.8–5.6)
HDLC SERPL-MCNC: 2.8 MG/DL (ref 0–5)
HDLC SERPL-MCNC: 50 MG/DL
LDL/HDL RATIO,LDHD: 1.1
LDLC SERPL CALC-MCNC: 55 MG/DL (ref 50–99)
NON-HDL CHOLESTEROL, 011976: 91 MG/DL
POTASSIUM SERPL-SCNC: 4.3 MMOL/L (ref 3.5–5.5)
PROT SERPL-MCNC: 7.1 G/DL (ref 6.4–8.3)
SODIUM SERPL-SCNC: 141 MMOL/L (ref 133–145)
TRIGL SERPL-MCNC: 182 MG/DL (ref 40–149)
VLDLC SERPL CALC-MCNC: 36 MG/DL (ref 8–30)

## 2022-04-25 DIAGNOSIS — E78.5 HYPERLIPIDEMIA, UNSPECIFIED HYPERLIPIDEMIA TYPE: ICD-10-CM

## 2022-04-25 RX ORDER — ROSUVASTATIN CALCIUM 10 MG/1
TABLET, COATED ORAL
Qty: 90 TABLET | Refills: 1 | Status: SHIPPED | OUTPATIENT
Start: 2022-04-25 | End: 2022-09-06 | Stop reason: SDUPTHER

## 2022-06-27 ENCOUNTER — OFFICE VISIT (OUTPATIENT)
Dept: FAMILY MEDICINE CLINIC | Age: 57
End: 2022-06-27
Payer: COMMERCIAL

## 2022-06-27 VITALS
HEIGHT: 67 IN | HEART RATE: 63 BPM | OXYGEN SATURATION: 96 % | DIASTOLIC BLOOD PRESSURE: 67 MMHG | RESPIRATION RATE: 22 BRPM | BODY MASS INDEX: 40.9 KG/M2 | TEMPERATURE: 98.2 F | WEIGHT: 260.6 LBS | SYSTOLIC BLOOD PRESSURE: 118 MMHG

## 2022-06-27 DIAGNOSIS — I50.22 CHRONIC SYSTOLIC CONGESTIVE HEART FAILURE (HCC): ICD-10-CM

## 2022-06-27 DIAGNOSIS — F31.62 BIPOLAR DISORDER, CURRENT EPISODE MIXED, MODERATE (HCC): ICD-10-CM

## 2022-06-27 DIAGNOSIS — I25.5 ISCHEMIC CARDIOMYOPATHY: ICD-10-CM

## 2022-06-27 DIAGNOSIS — E11.22 TYPE 2 DIABETES MELLITUS WITH CHRONIC KIDNEY DISEASE, WITHOUT LONG-TERM CURRENT USE OF INSULIN, UNSPECIFIED CKD STAGE (HCC): Primary | ICD-10-CM

## 2022-06-27 DIAGNOSIS — I10 ESSENTIAL HYPERTENSION: ICD-10-CM

## 2022-06-27 DIAGNOSIS — B36.0 TINEA VERSICOLOR: ICD-10-CM

## 2022-06-27 PROCEDURE — 99213 OFFICE O/P EST LOW 20 MIN: CPT | Performed by: NURSE PRACTITIONER

## 2022-06-27 PROCEDURE — 3044F HG A1C LEVEL LT 7.0%: CPT | Performed by: NURSE PRACTITIONER

## 2022-06-27 RX ORDER — NIFEDIPINE 60 MG/1
TABLET, EXTENDED RELEASE ORAL
Qty: 90 TABLET | Refills: 1 | Status: SHIPPED | OUTPATIENT
Start: 2022-06-27

## 2022-06-27 RX ORDER — EMPAGLIFLOZIN 10 MG/1
10 TABLET, FILM COATED ORAL DAILY
Qty: 30 TABLET | Refills: 0 | Status: SHIPPED | OUTPATIENT
Start: 2022-06-27 | End: 2022-07-25 | Stop reason: SDUPTHER

## 2022-06-27 RX ORDER — CARVEDILOL 25 MG/1
TABLET ORAL
Qty: 180 TABLET | Refills: 0 | Status: SHIPPED | OUTPATIENT
Start: 2022-06-27

## 2022-06-27 RX ORDER — ALLOPURINOL 100 MG/1
100 TABLET ORAL
Qty: 30 TABLET | Refills: 1 | Status: SHIPPED | OUTPATIENT
Start: 2022-06-27 | End: 2022-09-06 | Stop reason: SDUPTHER

## 2022-06-27 RX ORDER — KETOCONAZOLE 20 MG/G
CREAM TOPICAL 2 TIMES DAILY
Qty: 30 G | Refills: 0 | Status: SHIPPED | OUTPATIENT
Start: 2022-06-27 | End: 2022-07-21 | Stop reason: SDUPTHER

## 2022-06-27 RX ORDER — FLUOXETINE 10 MG/1
CAPSULE ORAL
Qty: 90 CAPSULE | Refills: 1 | Status: SHIPPED | OUTPATIENT
Start: 2022-06-27

## 2022-06-27 NOTE — PROGRESS NOTES
The Domonique Singh 59 y.o. male presents today for:    Chief Complaint   Patient presents with    Follow-up    Rash     back and chest, shoulder      Needs to get eye exam; schedule appointment    Patient has discoloration to upper shoulders/trunk     Review of Systems   Constitutional: Negative. Negative for chills, fever and malaise/fatigue. Respiratory: Negative for cough, hemoptysis and wheezing. Cardiovascular: Negative for chest pain, palpitations and leg swelling. Gastrointestinal: Negative for abdominal pain, blood in stool, constipation and diarrhea. Skin: Positive for rash. Negative for itching. docloaration rash shoulders/ trunk    Neurological: Negative for dizziness and headaches. Endo/Heme/Allergies: Negative. Psychiatric/Behavioral: Negative for depression and suicidal ideas. The patient is not nervous/anxious and does not have insomnia. Health Maintenance Due   Topic Date Due    Eye Exam Retinal or Dilated  Never done    Pneumococcal 0-64 years (2 - PCV) 05/13/2013        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     Cardiomyopathy ([facility]     [facility]     CVA (cerebral infarction)     Depression     ED (erectile dysfunction)     Gastritis     Gout     HTN (hypertension)     Hyperlipemia, mixed     Hypoxia     Kidney disease, chronic, stage III (moderate, EGFR 30-59 ml/min) ([facility])     Pre-diabetes     Sleep apnea     Vitamin D deficiency        Physical Exam  Constitutional:       General: He is not in acute distress. Appearance: He is obese. He is not toxic-appearing. Cardiovascular:      Rate and Rhythm: Normal rate and regular rhythm. Pulses: Normal pulses. Heart sounds: Normal heart sounds. No murmur heard. Pulmonary:      Effort: No respiratory distress. Breath sounds: Normal breath sounds. No wheezing.    Skin:     Capillary Refill: Capillary refill takes less than 2 seconds. Findings: No erythema or rash. Comments: Discoloration to upper shoulders; no open areas. Neurological:      General: No focal deficit present. Mental Status: He is alert and oriented to person, place, and time. Visit Vitals  /67 (BP 1 Location: Right arm, BP Patient Position: Sitting)   Pulse 63   Temp 98.2 °F (36.8 °C) (Temporal)   Resp 22   Ht 5' 7\" (1.702 m)   Wt 260 lb 9.6 oz (118.2 kg)   SpO2 96%   BMI 40.82 kg/m²       Current Outpatient Medications:     Jardiance 10 mg tablet, Take 1 Tablet by mouth daily. , Disp: 30 Tablet, Rfl: 0    NIFEdipine ER (ADALAT CC) 60 mg ER tablet, TAKE 1 TABLET DAILY. , Disp: 90 Tablet, Rfl: 1    allopurinoL (ZYLOPRIM) 100 mg tablet, Take 1 Tablet by mouth nightly., Disp: 30 Tablet, Rfl: 1    FLUoxetine (PROzac) 10 mg capsule, TAKE 1 CAPSULE BY MOUTH EVERY DAY, Disp: 90 Capsule, Rfl: 1    carvediloL (COREG) 25 mg tablet, TAKE 1 TABLET BY MOUTH ONCE A DAY WITH MEALS, Disp: 180 Tablet, Rfl: 0    ketoconazole (NIZORAL) 2 % topical cream, Apply  to affected area two (2) times a day., Disp: 30 g, Rfl: 0    rosuvastatin (CRESTOR) 10 mg tablet, TAKE 1 TABLET BY MOUTH ONCE NIGHTLY, Disp: 90 Tablet, Rfl: 1    aspirin delayed-release 81 mg tablet, Take 1 Tablet by mouth daily. , Disp: 90 Tablet, Rfl: 1    LORazepam (ATIVAN) 1 mg tablet, Take 1 Tablet by mouth every eight (8) hours as needed for Anxiety. Max Daily Amount: 3 mg. Indications: anxious, difficulty sleeping, Disp: 30 Tablet, Rfl: 0    ziprasidone hcl (GEODON) 60 mg capsule, TAKE 1 CAPSULE BY MOUTH TWICE A DAY WITH MEALS (Patient not taking: Reported on 6/27/2022), Disp: , Rfl:      ASSESSMENT and PLAN    ICD-10-CM ICD-9-CM    1. Type 2 diabetes mellitus with chronic kidney disease, without long-term current use of insulin, unspecified CKD stage (HCC)  E11.22 250.40 Jardiance 10 mg tablet     585.9    2.  Chronic systolic congestive heart failure (HCC)  I50.22 428.22 NIFEdipine ER (ADALAT CC) 60 mg ER tablet     428.0 carvediloL (COREG) 25 mg tablet   3. Bipolar disorder, current episode mixed, moderate (HCC)  F31.62 296.62 FLUoxetine (PROzac) 10 mg capsule   4. Ischemic cardiomyopathy  I25.5 414.8 carvediloL (COREG) 25 mg tablet   5. Essential hypertension  I10 401.9 carvediloL (COREG) 25 mg tablet   6.  Tinea versicolor  B36.0 111.0 ketoconazole (NIZORAL) 2 % topical cream         lab results and schedule of future lab studies reviewed with patient  reviewed diet, exercise and weight control  cardiovascular risk and specific lipid/LDL goals reviewed  reviewed medications and side effects in detail  radiology results and schedule of future radiology studies reviewed with patient    Trena Solorio NP

## 2022-06-27 NOTE — PROGRESS NOTES
Patient have not been to the eye doctor. Albino Pill presents today for   Chief Complaint   Patient presents with    Follow-up    Rash     back and chest, shoulder        Is someone accompanying this pt? no    Is the patient using any DME equipment during OV? no    Depression Screening:  3 most recent PHQ Screens 6/27/2022   PHQ Not Done -   Little interest or pleasure in doing things Not at all   Feeling down, depressed, irritable, or hopeless Not at all   Total Score PHQ 2 0   Thoughts of being better off dead, or hurting yourself in some way -       Learning Assessment:  Learning Assessment 6/12/2019   PRIMARY LEARNER Patient   PRIMARY LANGUAGE ENGLISH   LEARNER PREFERENCE PRIMARY -     -   ANSWERED BY -   RELATIONSHIP -       Health Maintenance reviewed and discussed and ordered per Provider. Health Maintenance Due   Topic Date Due    Eye Exam Retinal or Dilated  Never done    Pneumococcal 0-64 years (2 - PCV) 05/13/2013   . Coordination of Care:  1. Have you been to the ER, urgent care clinic since your last visit? Hospitalized since your last visit? no    2. Have you seen or consulted any other health care providers outside of the [address] [place] [address] since your last visit? Include any pap smears or colon screening. no      3. For patients aged 39-70: Has the patient had a colonoscopy / FIT/ Cologuard? Yes - no Care Gap present      If the patient is female:    4. For patients aged 41-77: Has the patient had a mammogram within the past 2 years? NA - based on age or sex      11. For patients aged 21-65: Has the patient had a pap smear?  NA - based on age or sex

## 2022-06-29 NOTE — PROGRESS NOTES
HISTORY OF PRESENT ILLNESS  Bryan Reyes is a 52 y.o. male here because of cough. Patient states that he has had a productive cough for 5 days. He denies any shortness of breath or wheezing. He was noted to have an O2 saturation of 90% here in the office. He was given a breathing treatment DuoNeb without any improvement. Cough   The history is provided by the patient. This is a new problem. The current episode started more than 2 days ago. The problem occurs constantly. The problem has not changed since onset. Pertinent negatives include no chest pain, no abdominal pain, no headaches and no shortness of breath. Nothing aggravates the symptoms. Nothing relieves the symptoms. He has tried nothing for the symptoms. Allergies   Allergen Reactions    Pcn [Penicillins] Anaphylaxis and Angioedema    Coconut Swelling     Current Outpatient Medications on File Prior to Visit   Medication Sig Dispense Refill    carvedilol (COREG) 25 mg tablet Take 1 Tab by mouth two (2) times daily (with meals). 60 Tab 6    furosemide (LASIX) 20 mg tablet Take 1 Tab by mouth two (2) times a day. 180 Tab 1    potassium chloride (KLOR-CON M10) 10 mEq tablet TAKE TWO TABLETS BY MOUTH ONCE DAILY 180 Tab 1    hydrALAZINE (APRESOLINE) 25 mg tablet TAKE ONE TABLET BY MOUTH THREE TIMES DAILY 90 Tab 1    rosuvastatin (CRESTOR) 10 mg tablet Take 1 Tab by mouth nightly for 180 days. 90 Tab 1    NIFEdipine ER (PROCARDIA XL) 60 mg ER tablet TAKE ONE TABLET BY MOUTH ONCE DAILY 90 Tab 1    FLUoxetine (PROZAC) 10 mg capsule Take  by mouth daily.  losartan (COZAAR) 100 mg tablet TAKE ONE TABLET BY MOUTH ONCE DAILY 30 Tab 6    colchicine 0.6 mg tablet Take 1 Tab by mouth daily. 90 Tab 0    spironolactone (ALDACTONE) 25 mg tablet TAKE ONE-HALF TABLET BY MOUTH ONCE DAILY 30 Tab 6    atorvastatin (LIPITOR) 20 mg tablet Take 1 Tab by mouth daily.  30 Tab 0    MISCELLANEOUS MEDICAL SUPPLY (COMPRESSION STOCKINGS) by Does Not Apply route. Wear while awake. Remove at HS.  LORazepam (ATIVAN) 1 mg tablet Take 1 Tab by mouth every eight (8) hours as needed. Max Daily Amount: 3 mg. Indications: ANXIETY, INSOMNIA 90 Tab 0    ARIPiprazole (ABILIFY) 5 mg tablet Take 5 mg by mouth daily.  aspirin delayed-release 81 mg tablet Take 81 mg by mouth daily. No current facility-administered medications on file prior to visit.       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 ([facility]     [facility]     [facility] failure[facility]     CVA (cerebral infarction)     Depression     ED (erectile dysfunction)     Gastritis     Gout     HTN (hypertension)     Hyperlipemia, mixed     Hypoxia     Kidney disease, chronic, stage III (moderate, EGFR 30-59 ml/min) ([facility])     Pre-diabetes     Sleep apnea     Vitamin D deficiency      Past Surgical History:   Procedure Laterality Date    HX ACL RECONSTRUCTION      HX APPENDECTOMY  2004    became septic from a rupture     Family History   Problem Relation Age of Onset    No Known Problems Mother     Hypertension Father     Diabetes Father     Cancer Father         prostate    No Known Problems Sister     Hypertension Maternal Grandmother     No Known Problems Maternal Grandfather     Alcohol abuse Paternal Grandmother     Bipolar Disorder Paternal Grandmother     No Known Problems Paternal Grandfather      Social History     Socioeconomic History    Marital status:      Spouse name: Not on file    Number of children: Not on file    Years of education: Not on file    Highest education level: Not on file   Social Needs    Financial resource strain: Not on file    Food insecurity - worry: Not on file    Food insecurity - inability: Not on file   OOgave needs - medical: Not on file   OOgave needs - non-medical: Not on file   Occupational History    Not on file   Tobacco Use    Smoking status: Never Smoker  Smokeless tobacco: Never Used   Substance and Sexual Activity    Alcohol use: No     Comment: holidays/ occasions    Drug use: No    Sexual activity: Yes     Partners: Female   Other Topics Concern     Service Yes     Comment: [employer]    Blood Transfusions No    Caffeine Concern No    Occupational Exposure No    Hobby Hazards No    Sleep Concern No     Comment: sleep apnea, has cpap    Stress Concern Yes     Comment: family related    Weight Concern No     Comment: Pt has lost 20lbs but is wanting to lose more    Special Diet Yes     Comment: no salt    Back Care No    Exercise No    Bike Helmet No    Seat Belt Yes    Self-Exams Yes   Social History Narrative    Not on file         Review of Systems   Constitutional: Negative. Eyes: Negative. Respiratory: Positive for cough and sputum production. Negative for hemoptysis, shortness of breath and wheezing. Cardiovascular: Negative. Negative for chest pain. Gastrointestinal: Negative for abdominal pain. Musculoskeletal: Negative. Neurological: Negative. Negative for headaches. Endo/Heme/Allergies: Negative. Psychiatric/Behavioral: Negative. Visit Vitals  /62 (BP 1 Location: Left arm, BP Patient Position: Sitting)   Pulse 72   Temp 99 °F (37.2 °C) (Oral)   Resp 16   Ht 5' 7\" (1.702 m)   Wt 284 lb (128.8 kg)   SpO2 90%   BMI 44.48 kg/m²       Physical Exam   Constitutional: He is oriented to person, place, and time. He appears well-developed and well-nourished. HENT:   Head: Normocephalic and atraumatic. Cardiovascular: Normal rate, regular rhythm, normal heart sounds and intact distal pulses. Exam reveals no gallop and no friction rub. No murmur heard. Pulmonary/Chest: Effort normal and breath sounds normal. No respiratory distress. He has no wheezes. He has no rales. Musculoskeletal: Normal range of motion. He exhibits no edema or tenderness.    Neurological: He is alert and oriented to person, place, and time. No cranial nerve deficit. Coordination normal.   Skin: Skin is warm and dry. No rash noted. No erythema. No pallor. Psychiatric: He has a normal mood and affect. His behavior is normal. Thought content normal.   Nursing note and vitals reviewed. ASSESSMENT and PLAN    ICD-10-CM ICD-9-CM    1. Cough R05 786.2 XR CHEST PA LAT      azithromycin (ZITHROMAX) 250 mg tablet      CBC WITH AUTOMATED DIFF   2. Acute bronchitis, unspecified organism J20.9 466.0 azithromycin (ZITHROMAX) 250 mg tablet      CBC WITH AUTOMATED DIFF     Follow-up Disposition:  Return in about 1 week (around 10/24/2018) for Patient sent directly to the emergency room for fu. No

## 2022-07-21 ENCOUNTER — OFFICE VISIT (OUTPATIENT)
Dept: FAMILY MEDICINE CLINIC | Age: 57
End: 2022-07-21
Payer: COMMERCIAL

## 2022-07-21 VITALS
DIASTOLIC BLOOD PRESSURE: 72 MMHG | HEART RATE: 58 BPM | BODY MASS INDEX: 40.9 KG/M2 | WEIGHT: 260.6 LBS | OXYGEN SATURATION: 96 % | RESPIRATION RATE: 22 BRPM | SYSTOLIC BLOOD PRESSURE: 132 MMHG | HEIGHT: 67 IN | TEMPERATURE: 97.8 F

## 2022-07-21 DIAGNOSIS — I50.22 CHRONIC SYSTOLIC CONGESTIVE HEART FAILURE (HCC): ICD-10-CM

## 2022-07-21 DIAGNOSIS — Z23 ENCOUNTER FOR IMMUNIZATION: ICD-10-CM

## 2022-07-21 DIAGNOSIS — B36.0 TINEA VERSICOLOR: Primary | ICD-10-CM

## 2022-07-21 DIAGNOSIS — F31.62 BIPOLAR DISORDER, CURRENT EPISODE MIXED, MODERATE (HCC): ICD-10-CM

## 2022-07-21 DIAGNOSIS — N18.30 STAGE 3 CHRONIC KIDNEY DISEASE, UNSPECIFIED WHETHER STAGE 3A OR 3B CKD (HCC): ICD-10-CM

## 2022-07-21 DIAGNOSIS — E78.5 HYPERLIPIDEMIA, UNSPECIFIED HYPERLIPIDEMIA TYPE: ICD-10-CM

## 2022-07-21 PROCEDURE — 90677 PCV20 VACCINE IM: CPT | Performed by: NURSE PRACTITIONER

## 2022-07-21 PROCEDURE — 99213 OFFICE O/P EST LOW 20 MIN: CPT | Performed by: NURSE PRACTITIONER

## 2022-07-21 PROCEDURE — 90471 IMMUNIZATION ADMIN: CPT | Performed by: NURSE PRACTITIONER

## 2022-07-21 RX ORDER — KETOCONAZOLE 20 MG/G
CREAM TOPICAL 2 TIMES DAILY
Qty: 30 G | Refills: 2 | Status: SHIPPED | OUTPATIENT
Start: 2022-07-21

## 2022-07-21 NOTE — PROGRESS NOTES
Patient has an appointment to the eye doctor on the 26 of August. Patient will bring covid vaccine card next visit. Maico Rhodes presents today for   Chief Complaint   Patient presents with    Follow-up     3 month        Is someone accompanying this pt? no    Is the patient using any DME equipment during OV? no    Depression Screening:  3 most recent PHQ Screens 7/21/2022   PHQ Not Done -   Little interest or pleasure in doing things Not at all   Feeling down, depressed, irritable, or hopeless Not at all   Total Score PHQ 2 0   Thoughts of being better off dead, or hurting yourself in some way -       Learning Assessment:  Learning Assessment 6/12/2019   PRIMARY LEARNER Patient   PRIMARY LANGUAGE ENGLISH   LEARNER PREFERENCE PRIMARY -     -   ANSWERED BY -   RELATIONSHIP -       Health Maintenance reviewed and discussed and ordered per Provider. Health Maintenance Due   Topic Date Due    Eye Exam Retinal or Dilated  Never done    Pneumococcal 0-64 years (2 - PCV) 05/13/2013    COVID-19 Vaccine (4 - Booster for Marika Reas series) 02/13/2022   . Coordination of Care:  1. Have you been to the ER, urgent care clinic since your last visit? Hospitalized since your last visit? no    2. Have you seen or consulted any other health care providers outside of the [address] [place] [address] since your last visit? Include any pap smears or colon screening. No           3. For patients aged 39-70: Has the patient had a colonoscopy / FIT/ Cologuard? Yes - no Care Gap present      If the patient is female:    4. For patients aged 41-77: Has the patient had a mammogram within the past 2 years? NA - based on age or sex      11. For patients aged 21-65: Has the patient had a pap smear? NA - based on age or sex      Patient was given VIS for review, consent was obtained and per orders of NP. Michael Randall , injection of Pneumococcal 20 given by [facility] CMA. Patient observed.  No signs nor symptoms of any adverse reactions. Patient tolerated injection well.

## 2022-07-21 NOTE — PROGRESS NOTES
The Dante Soto 59 y.o. male presents today for:    Chief Complaint   Patient presents with    Follow-up     3 month      Rash is almost completely gone to chest and shoulders     Eye exam scheduled for 8/2022    Kidney doctor on 8/3/2022     Review of Systems   Constitutional: Negative. Negative for chills, fever and malaise/fatigue. Respiratory:  Negative for cough, hemoptysis and wheezing. Cardiovascular:  Negative for chest pain, palpitations and leg swelling. Gastrointestinal:  Negative for abdominal pain, blood in stool, constipation and diarrhea. Skin:  Positive for rash. Negative for itching. docloaration rash shoulders/ trunk-improved   Neurological:  Negative for dizziness and headaches. Endo/Heme/Allergies: Negative. Psychiatric/Behavioral:  Negative for depression and suicidal ideas. The patient is not nervous/anxious and does not have insomnia. Health Maintenance Due   Topic Date Due    Eye Exam Retinal or Dilated  Never done    COVID-19 Vaccine (4 - Booster for Moderna series) 02/13/2022        Past Medical History:   Diagnosis Date    Anemia     Bipolar II disorder (HCC)     Cardiomyopathy (HCC)     CHF (congestive heart failure) (HCC)     CVA (cerebral infarction)     Depression     ED (erectile dysfunction)     Gastritis     Gout     HTN (hypertension)     Hyperlipemia, mixed     Hypoxia     Kidney disease, chronic, stage III (moderate, EGFR 30-59 ml/min) (HCC)     Pre-diabetes     Sleep apnea     Vitamin D deficiency        Physical Exam  Constitutional:       General: He is not in acute distress. Appearance: He is obese. He is not toxic-appearing. Cardiovascular:      Rate and Rhythm: Normal rate and regular rhythm. Pulses: Normal pulses. Heart sounds: Normal heart sounds. No murmur heard. Pulmonary:      Effort: No respiratory distress. Breath sounds: Normal breath sounds. No wheezing.    Skin:     Capillary Refill: Capillary refill takes less than 2 seconds. Findings: No erythema or rash. Comments: Discoloration to upper shoulders; no open areas. Neurological:      General: No focal deficit present. Mental Status: He is alert and oriented to person, place, and time. Visit Vitals  /72 (BP 1 Location: Right arm, BP Patient Position: Sitting)   Pulse (!) 58   Temp 97.8 °F (36.6 °C) (Temporal)   Resp 22   Ht 5' 7\" (1.702 m)   Wt 260 lb 9.6 oz (118.2 kg)   SpO2 96%   BMI 40.82 kg/m²       Current Outpatient Medications:     ketoconazole (NIZORAL) 2 % topical cream, Apply  to affected area two (2) times a day., Disp: 30 g, Rfl: 2    NIFEdipine ER (ADALAT CC) 60 mg ER tablet, TAKE 1 TABLET DAILY. , Disp: 90 Tablet, Rfl: 1    allopurinoL (ZYLOPRIM) 100 mg tablet, Take 1 Tablet by mouth nightly., Disp: 30 Tablet, Rfl: 1    FLUoxetine (PROzac) 10 mg capsule, TAKE 1 CAPSULE BY MOUTH EVERY DAY, Disp: 90 Capsule, Rfl: 1    carvediloL (COREG) 25 mg tablet, TAKE 1 TABLET BY MOUTH ONCE A DAY WITH MEALS, Disp: 180 Tablet, Rfl: 0    rosuvastatin (CRESTOR) 10 mg tablet, TAKE 1 TABLET BY MOUTH ONCE NIGHTLY, Disp: 90 Tablet, Rfl: 1    aspirin delayed-release 81 mg tablet, Take 1 Tablet by mouth daily. , Disp: 90 Tablet, Rfl: 1    LORazepam (ATIVAN) 1 mg tablet, Take 1 Tablet by mouth every eight (8) hours as needed for Anxiety. Max Daily Amount: 3 mg. Indications: anxious, difficulty sleeping, Disp: 30 Tablet, Rfl: 0    Jardiance 10 mg tablet, Take 1 Tablet by mouth in the morning., Disp: 90 Tablet, Rfl: 0    ziprasidone hcl (GEODON) 60 mg capsule, TAKE 1 CAPSULE BY MOUTH TWICE A DAY WITH MEALS (Patient not taking: No sig reported), Disp: , Rfl:       ASSESSMENT and PLAN    ICD-10-CM ICD-9-CM    1. Tinea versicolor  B36.0 111.0 ketoconazole (NIZORAL) 2 % topical cream      2. Encounter for immunization  Z23 V03.89 PNEUMOCOCCAL, PCV20, PREVNAR 20, (AGE 18 YRS+), IM, PF      3.  Bipolar disorder, current episode mixed, moderate ([email or web address]  F31.62 296.62 4. Chronic systolic congestive heart failure (HCC)  I50.22 428.22      428.0       5. Stage 3 chronic kidney disease, unspecified whether stage 3a or 3b CKD (HCC)  N18.30 585.3       6. Hyperlipidemia, unspecified hyperlipidemia type  E78.5 272.4         Follow-up and Dispositions    Return in about 3 months (around 10/21/2022).        lab results and schedule of future lab studies reviewed with patient  reviewed diet, exercise and weight control  cardiovascular risk and specific lipid/LDL goals reviewed  reviewed medications and side effects in detail    Mark Sanders NP

## 2022-07-25 DIAGNOSIS — E11.22 TYPE 2 DIABETES MELLITUS WITH CHRONIC KIDNEY DISEASE, WITHOUT LONG-TERM CURRENT USE OF INSULIN, UNSPECIFIED CKD STAGE (HCC): ICD-10-CM

## 2022-07-25 RX ORDER — EMPAGLIFLOZIN 10 MG/1
10 TABLET, FILM COATED ORAL DAILY
Qty: 90 TABLET | Refills: 0 | Status: SHIPPED | OUTPATIENT
Start: 2022-07-25 | End: 2022-08-15 | Stop reason: SDUPTHER

## 2022-08-15 DIAGNOSIS — E11.22 TYPE 2 DIABETES MELLITUS WITH CHRONIC KIDNEY DISEASE, WITHOUT LONG-TERM CURRENT USE OF INSULIN, UNSPECIFIED CKD STAGE (HCC): ICD-10-CM

## 2022-08-15 RX ORDER — EMPAGLIFLOZIN 10 MG/1
10 TABLET, FILM COATED ORAL DAILY
Qty: 90 TABLET | Refills: 0 | Status: SHIPPED | OUTPATIENT
Start: 2022-08-15 | End: 2022-11-04 | Stop reason: SDUPTHER

## 2022-09-06 DIAGNOSIS — E78.5 HYPERLIPIDEMIA, UNSPECIFIED HYPERLIPIDEMIA TYPE: ICD-10-CM

## 2022-09-06 DIAGNOSIS — F31.62 BIPOLAR DISORDER, CURRENT EPISODE MIXED, MODERATE (HCC): ICD-10-CM

## 2022-09-06 RX ORDER — ALLOPURINOL 100 MG/1
100 TABLET ORAL
Qty: 30 TABLET | Refills: 1 | Status: SHIPPED | OUTPATIENT
Start: 2022-09-06

## 2022-09-06 RX ORDER — ROSUVASTATIN CALCIUM 10 MG/1
TABLET, COATED ORAL
Qty: 90 TABLET | Refills: 1 | Status: SHIPPED | OUTPATIENT
Start: 2022-09-06

## 2022-11-04 ENCOUNTER — OFFICE VISIT (OUTPATIENT)
Dept: FAMILY MEDICINE CLINIC | Age: 57
End: 2022-11-04
Payer: COMMERCIAL

## 2022-11-04 VITALS
TEMPERATURE: 98.1 F | SYSTOLIC BLOOD PRESSURE: 133 MMHG | RESPIRATION RATE: 22 BRPM | OXYGEN SATURATION: 95 % | WEIGHT: 254 LBS | HEIGHT: 67 IN | BODY MASS INDEX: 39.87 KG/M2 | HEART RATE: 62 BPM | DIASTOLIC BLOOD PRESSURE: 78 MMHG

## 2022-11-04 DIAGNOSIS — I50.22 CHRONIC SYSTOLIC CONGESTIVE HEART FAILURE (HCC): ICD-10-CM

## 2022-11-04 DIAGNOSIS — E11.22 TYPE 2 DIABETES MELLITUS WITH CHRONIC KIDNEY DISEASE, WITHOUT LONG-TERM CURRENT USE OF INSULIN, UNSPECIFIED CKD STAGE (HCC): ICD-10-CM

## 2022-11-04 DIAGNOSIS — F31.62 BIPOLAR DISORDER, CURRENT EPISODE MIXED, MODERATE (HCC): ICD-10-CM

## 2022-11-04 DIAGNOSIS — Z12.11 COLON CANCER SCREENING: ICD-10-CM

## 2022-11-04 DIAGNOSIS — E78.5 HYPERLIPIDEMIA, UNSPECIFIED HYPERLIPIDEMIA TYPE: Primary | ICD-10-CM

## 2022-11-04 DIAGNOSIS — N18.30 STAGE 3 CHRONIC KIDNEY DISEASE, UNSPECIFIED WHETHER STAGE 3A OR 3B CKD (HCC): ICD-10-CM

## 2022-11-04 DIAGNOSIS — I25.5 ISCHEMIC CARDIOMYOPATHY: ICD-10-CM

## 2022-11-04 PROCEDURE — 3044F HG A1C LEVEL LT 7.0%: CPT | Performed by: NURSE PRACTITIONER

## 2022-11-04 PROCEDURE — 3078F DIAST BP <80 MM HG: CPT | Performed by: NURSE PRACTITIONER

## 2022-11-04 PROCEDURE — 3074F SYST BP LT 130 MM HG: CPT | Performed by: NURSE PRACTITIONER

## 2022-11-04 PROCEDURE — 99214 OFFICE O/P EST MOD 30 MIN: CPT | Performed by: NURSE PRACTITIONER

## 2022-11-04 RX ORDER — EMPAGLIFLOZIN 10 MG/1
10 TABLET, FILM COATED ORAL DAILY
Qty: 90 TABLET | Refills: 0 | Status: SHIPPED | OUTPATIENT
Start: 2022-11-04

## 2022-11-04 NOTE — PROGRESS NOTES
Patient has the flu vaccine. Patient has the 4th covid vaccine. Christine Riley presents today for   Chief Complaint   Patient presents with    Follow-up     3 month        Is someone accompanying this pt? no    Is the patient using any DME equipment during OV? no    Depression Screening:  3 most recent PHQ Screens 7/21/2022   PHQ Not Done -   Little interest or pleasure in doing things Not at all   Feeling down, depressed, irritable, or hopeless Not at all   Total Score PHQ 2 0   Thoughts of being better off dead, or hurting yourself in some way -       Learning Assessment:  Learning Assessment 6/12/2019   PRIMARY LEARNER Patient   PRIMARY LANGUAGE ENGLISH   LEARNER PREFERENCE PRIMARY -     -   ANSWERED BY -   RELATIONSHIP -       Health Maintenance reviewed and discussed and ordered per Provider. Health Maintenance Due   Topic Date Due    Eye Exam Retinal or Dilated  Never done    Hepatitis B Vaccine (1 of 3 - Risk 3-dose series) Never done    Shingrix Vaccine Age 50> (1 of 2) Never done    COVID-19 Vaccine (4 - Booster for Moderna series) 12/08/2021    Flu Vaccine (1) 08/01/2022    Foot Exam Q1  10/21/2022    MICROALBUMIN Q1  10/21/2022    Colorectal Cancer Screening Combo  11/24/2022   . Coordination of Care:  1. Have you been to the ER, urgent care clinic since your last visit? Hospitalized since your last visit? no    2. Have you seen or consulted any other health care providers outside of the [address] [place] [address] since your last visit? Include any pap smears or colon screening. No        3. For patients aged 39-70: Has the patient had a colonoscopy / FIT/ Cologuard? No      If the patient is female:    4. For patients aged 41-77: Has the patient had a mammogram within the past 2 years? NA - based on age or sex      11. For patients aged 21-65: Has the patient had a pap smear?  NA - based on age or sex

## 2022-11-04 NOTE — PROGRESS NOTES
Alicia Miranda is a 59 y.o. male and presents with Follow-up (3 month )    Assessment/Plan:    Diagnoses and all orders for this visit:    1. Hyperlipidemia, unspecified hyperlipidemia type    2. Type 2 diabetes mellitus with chronic kidney disease, without long-term current use of insulin, unspecified CKD stage (HCC)  -     Jardiance 10 mg tablet; Take 1 Tablet by mouth daily. -      DIABETES FOOT EXAM  -     MICROALBUMIN, UR, RAND W/ MICROALB/CREAT RATIO; Future    3. Bipolar disorder, current episode mixed, moderate ([address]    4. Chronic systolic congestive heart failure ([address]    5. Ischemic cardiomyopathy    6. Stage 3 chronic kidney disease, unspecified whether stage 3a or 3b CKD ([address]    7. Colon cancer screening  -     COLOGUARD TEST (FECAL DNA COLORECTAL CANCER SCREENING)    Follow-up and Dispositions    Return in about 3 months (around 2/4/2023) for annual physical with fasting labs 1 week prior . Health Maintenance:   Health Maintenance   Topic Date Due    Eye Exam Retinal or Dilated  Never done    Hepatitis B Vaccine (1 of 3 - Risk 3-dose series) Never done    Shingrix Vaccine Age 50> (1 of 2) Never done    Colorectal Cancer Screening Combo  11/24/2022    COVID-19 Vaccine (5 - Booster for Moderna series) 11/29/2022    A1C test (Diabetic or Prediabetic)  04/21/2023    Lipid Screen  04/21/2023    Depression Monitoring  11/04/2023    Foot Exam Q1  11/04/2023    MICROALBUMIN Q1  11/04/2023    DTaP/Tdap/Td series (2 - Td or Tdap) 10/23/2029    Flu Vaccine  Completed    Pneumococcal 0-64 years  Completed    Hepatitis C Screening  Discontinued        Subjective:    Labs obtained prior to visit? NO  Reviewed with patient? Not applicable    Seeing cardiology, psych, nephro, ophthalmology    Needs eye exam     Refuses colonoscopy-will wait until age 24[facility]   --pt agrees to complete Cologuard testing     ROS:     Review of Systems   Constitutional: Negative. Negative for chills, fever and malaise/fatigue. Respiratory:  Negative for cough, hemoptysis and wheezing. Cardiovascular:  Negative for chest pain, palpitations and leg swelling. Gastrointestinal:  Negative for abdominal pain, blood in stool, constipation and diarrhea. Skin:  Negative for itching and rash. Neurological:  Negative for dizziness and headaches. Endo/Heme/Allergies: Negative. Psychiatric/Behavioral:  Positive for depression. Negative for suicidal ideas. The patient is not nervous/anxious and does not have insomnia. The problem list was updated as a part of today's visit. Patient Active Problem List   Diagnosis Code    CHF (congestive heart failure) ([facility]) I50.9    Gout M10.9    Cardiomyopathy ([facility] I42.9    Sleep apnea G47.30    Pre-diabetes R73.03    Hyperlipidemia E78.5    Vitamin D deficiency E55.9    Essential hypertension I10    Left knee pain M25.562    Chronic renal disease N18.9    Bipolar disorder, current episode mixed, moderate ([facility]) F31.62    Osteoarthritis of knee M17.9    Stage 3 chronic kidney disease ([facility]) N18.30    Acute renal failure with tubular necrosis ([facility]) N17.0    ADD (attention deficit disorder) F98.8    Atypical chest pain R07.89    CAP (community acquired pneumonia) J18.9    Cardiomyopathy, dilated, nonischemic ([facility]) I42.0    C[facility]brovascular accident [facility] I63.9    Depression F32. A    Erectile dysfunction N52.9    Mental confusion R41.0    Septic joint of left knee joint ([facility]) M00.9    Type 2 diabetes mellitus with chronic kidney disease ([facility]) E11.22       The PSH, FH were reviewed.       SH:  Social History     Tobacco Use    Smoking status: Never    Smokeless tobacco: Never   Vaping Use    Vaping Use: Never used   Substance Use Topics    Alcohol use: No     Comment: holidays/ occasions    Drug use: No       Medications/Allergies:  Current Outpatient Medications on File Prior to Visit   Medication Sig Dispense Refill    rosuvastatin (CRESTOR) 10 mg tablet TAKE 1 TABLET BY MOUTH ONCE NIGHTLY 90 Tablet 1 allopurinoL (ZYLOPRIM) 100 mg tablet Take 1 Tablet by mouth nightly. 30 Tablet 1    ketoconazole (NIZORAL) 2 % topical cream Apply  to affected area two (2) times a day. 30 g 2    NIFEdipine ER (ADALAT CC) 60 mg ER tablet TAKE 1 TABLET DAILY. 90 Tablet 1    FLUoxetine (PROzac) 10 mg capsule TAKE 1 CAPSULE BY MOUTH EVERY DAY 90 Capsule 1    carvediloL (COREG) 25 mg tablet TAKE 1 TABLET BY MOUTH ONCE A DAY WITH MEALS 180 Tablet 0    aspirin delayed-release 81 mg tablet Take 1 Tablet by mouth daily. 90 Tablet 1    LORazepam (ATIVAN) 1 mg tablet Take 1 Tablet by mouth every eight (8) hours as needed for Anxiety. Max Daily Amount: 3 mg. Indications: anxious, difficulty sleeping 30 Tablet 0     No current facility-administered medications on file prior to visit. Allergies   Allergen Reactions    Pcn [Penicillins] Anaphylaxis and Angioedema    Coconut Swelling       Objective:  Visit Vitals  /78 (BP 1 Location: Left upper arm, BP Patient Position: Sitting)   Pulse 62   Temp 98.1 °F (36.7 °C) (Temporal)   Resp 22   Ht 5' 7\" (1.702 m)   Wt 254 lb (115.2 kg)   SpO2 95%   BMI 39.78 kg/m²    Body mass index is 39.78 kg/m². Physical assessment  Physical Exam  Constitutional:       General: He is not in acute distress. Appearance: He is obese. He is not toxic-appearing. Cardiovascular:      Rate and Rhythm: Normal rate and regular rhythm. Pulses: Normal pulses. Dorsalis pedis pulses are 2+ on the right side and 2+ on the left side. Posterior tibial pulses are 2+ on the right side and 2+ on the left side. Heart sounds: Normal heart sounds. Pulmonary:      Effort: Pulmonary effort is normal.      Breath sounds: Normal breath sounds. Feet:      Right foot:      Protective Sensation: 6 sites tested. 6 sites sensed. Skin integrity: Skin integrity normal.      Toenail Condition: Right toenails are normal.      Left foot:      Protective Sensation: 6 sites tested.   6 sites sensed. Skin integrity: Skin integrity normal.      Toenail Condition: Left toenails are normal.   Neurological:      General: No focal deficit present. Mental Status: He is alert and oriented to person, place, and time. Labwork and Ancillary Studies:    CBC w/Diff  Lab Results   Component Value Date/Time    WBC 7.8 06/13/2019 12:12 PM    HGB 13.7 06/13/2019 12:12 PM    PLATELET 241 22/67/7313 12:12 PM         Basic Metabolic Profile  Lab Results   Component Value Date/Time    Sodium 141 04/21/2022 10:41 AM    Potassium 4.3 04/21/2022 10:41 AM    Chloride 103 04/21/2022 10:41 AM    CO2 28 04/21/2022 10:41 AM    Anion gap 10.0 04/21/2022 10:41 AM    Glucose 119 (H) 04/21/2022 10:41 AM    BUN 28 (H) 04/21/2022 10:41 AM    Creatinine 1.7 (H) 04/21/2022 10:41 AM    BUN/Creatinine ratio 13 11/21/2016 02:46 PM    GFR est AA 73 11/21/2016 02:46 PM    GFR est non-AA 63 11/21/2016 02:46 PM    Calcium 9.0 04/21/2022 10:41 AM        Cholesterol  Lab Results   Component Value Date/Time    Cholesterol, total 141 04/21/2022 10:41 AM    HDL Cholesterol 50 04/21/2022 10:41 AM    LDL, calculated 55 04/21/2022 10:41 AM    Triglyceride 182 (H) 04/21/2022 10:41 AM    CHOL/HDL Ratio 4.7 04/26/2010 10:00 AM           I have discussed the diagnosis with the patient and the intended plan as seen in the above orders. The patient has received an After-Visit Summary and questions were answered concerning future plans. An After Visit Summary was printed and given to the patient. All diagnosis have been discussed with the patient and all of the patient's questions have been answered. Follow-up and Dispositions    Return in about 3 months (around 2/4/2023) for annual physical with fasting labs 1 week prior . Ashleigh Piper NP-C  [address]  [id number]

## 2022-11-05 LAB
CREATININE, URINE: 124 MG/DL
MICROALB/CREAT RATIO, 140286: 20.1 (ref 0–30)
MICROALBUMIN,URINE RANDOM 140054: 24.9 MG/L (ref 0.1–17)

## 2022-11-21 ENCOUNTER — VIRTUAL VISIT (OUTPATIENT)
Dept: FAMILY MEDICINE CLINIC | Age: 57
End: 2022-11-21
Payer: COMMERCIAL

## 2022-11-21 DIAGNOSIS — E11.22 TYPE 2 DIABETES MELLITUS WITH CHRONIC KIDNEY DISEASE, WITHOUT LONG-TERM CURRENT USE OF INSULIN, UNSPECIFIED CKD STAGE (HCC): ICD-10-CM

## 2022-11-21 DIAGNOSIS — F31.62 BIPOLAR DISORDER, CURRENT EPISODE MIXED, MODERATE (HCC): ICD-10-CM

## 2022-11-21 DIAGNOSIS — U07.1 COVID-19: Primary | ICD-10-CM

## 2022-11-21 PROCEDURE — 3044F HG A1C LEVEL LT 7.0%: CPT | Performed by: NURSE PRACTITIONER

## 2022-11-21 PROCEDURE — 99212 OFFICE O/P EST SF 10 MIN: CPT | Performed by: NURSE PRACTITIONER

## 2022-11-21 NOTE — PROGRESS NOTES
Patient has appointment for eye doctor in December 2022. Emi Marcano presents today for   Chief Complaint   Patient presents with    Positive For Covid-19     Friday        Virtual/telephone visit    Depression Screening:  3 most recent PHQ Screens 11/21/2022   PHQ Not Done -   Little interest or pleasure in doing things Not at all   Feeling down, depressed, irritable, or hopeless Not at all   Total Score PHQ 2 0   Thoughts of being better off dead, or hurting yourself in some way -       Learning Assessment:  Learning Assessment 6/12/2019   PRIMARY LEARNER Patient   PRIMARY LANGUAGE ENGLISH   LEARNER PREFERENCE PRIMARY -     -   ANSWERED BY -   RELATIONSHIP -       Health Maintenance reviewed and discussed and ordered per Provider. Health Maintenance Due   Topic Date Due    Eye Exam Retinal or Dilated  Never done    Hepatitis B Vaccine (1 of 3 - Risk 3-dose series) Never done    Shingrix Vaccine Age 50> (1 of 2) Never done    Colorectal Cancer Screening Combo  11/24/2022   . Coordination of Care:  1. Have you been to the ER, urgent care clinic since your last visit? Hospitalized since your last visit? no    2. Have you seen or consulted any other health care providers outside of the [address] [place] [address] since your last visit? Include any pap smears or colon screening. Yes Psychiatrist       3. For patients aged 39-70: Has the patient had a colonoscopy / FIT/ Cologuard? No Patient wife states he did the cologuard test Thursday 11/17, 2022. If the patient is female:    4. For patients aged 41-77: Has the patient had a mammogram within the past 2 years? NA - based on age or sex      11. For patients aged 21-65: Has the patient had a pap smear?  NA - based on age or sex

## 2022-11-21 NOTE — PROGRESS NOTES
Cherelle Carbajal (: 1965) is a 59 y.o. male, established patient, here for evaluation of the following chief complaint(s):   Positive For Covid-19 (Friday )     Patient tested positive this past Friday  --pt felt weak on Friday because having 2 nights coughing  --coricidin every 4 hours   --out of quarantine tomorrow (5 days)  --mask for 5 days in public    Denies need for paxlovid    --denies cough, SOB or chest pain      Eye appt 2022    ASSESSMENT/PLAN:  Below is the assessment and plan developed based on review of pertinent history, labs, studies, and medications. 1. COVID-19  2. Bipolar disorder, current episode mixed, moderate ([address]  3. Type 2 diabetes mellitus with chronic kidney disease, without long-term current use of insulin, unspecified CKD stage (HCC)    No follow-ups on file. SUBJECTIVE/OBJECTIVE:  HPI    Review of Systems   Constitutional:  Positive for chills and fatigue. Negative for fever. HENT:  Positive for congestion and sneezing. Respiratory:  Positive for cough. Negative for chest tightness and shortness of breath. Cardiovascular:  Negative for chest pain.       No data recorded     Physical Exam    [INSTRUCTIONS:  \"[x]\" Indicates a positive item  \"[]\" Indicates a negative item  -- DELETE ALL ITEMS NOT EXAMINED]    Constitutional: [x] Appears well-developed and well-nourished [x] No apparent distress      [] Abnormal -     Mental status: [x] Alert and awake  [x] Oriented to person/place/time [x] Able to follow commands    [] Abnormal -     Eyes:   EOM    [x]  Normal    [] Abnormal -   Sclera  [x]  Normal    [] Abnormal -          Discharge [x]  None visible   [] Abnormal -     HENT: [x] Normocephalic, atraumatic  [] Abnormal -   [x] Mouth/Throat: Mucous membranes are moist    External Ears [x] Normal  [] Abnormal -    Neck: [x] No visualized mass [] Abnormal -     Pulmonary/Chest: [x] Respiratory effort normal   [x] No visualized signs of difficulty breathing or respiratory distress        [] Abnormal -      Musculoskeletal:   [x] Normal gait with no signs of ataxia         [x] Normal range of motion of neck        [] Abnormal -     Neurological:        [x] No Facial Asymmetry (Cranial nerve 7 motor function) (limited exam due to video visit)          [x] No gaze palsy        [] Abnormal -          Skin:        [x] No significant exanthematous lesions or discoloration noted on facial skin         [] Abnormal -            Psychiatric:       [x] Normal Affect [] Abnormal -        [x] No Hallucinations    Other pertinent observable physical exam findings:-    On this date 11/21/2022 I have spent 5 minutes reviewing previous notes, test results and face to face (virtual) with the patient discussing the diagnosis and importance of compliance with the treatment plan as well as documenting on the day of the visit. Christa Huggins, was evaluated through a synchronous (real-time) audio-video encounter. The patient (or guardian if applicable) is aware that this is a billable service, which includes applicable co-pays. This Virtual Visit was conducted with patient's (and/or legal guardian's) consent. The visit was conducted pursuant to the emergency declaration under the [address] [place] [address], [address] [place] [address] authority and the [employer] General Act. Patient identification was verified, and a caregiver was present when appropriate. The patient was located at: Home: [facility]. [phone]  The provider was located at: Facility (Appt Department): [address] [place] [address]  [phone]       An electronic signature was used to authenticate this note.   -- Abdi Dawson NP

## 2022-12-16 ENCOUNTER — TELEPHONE (OUTPATIENT)
Dept: FAMILY MEDICINE CLINIC | Age: 57
End: 2022-12-16

## 2022-12-16 NOTE — TELEPHONE ENCOUNTER
----- Message from Chalino Arevalo sent at 12/8/2022 10:09 AM EST -----  Subject: Message to Provider    QUESTIONS  Information for Provider? Pt received a letter and called to request   Cologuard test results, he was unable to provide the date test was   performed. Test was ordered by Karina Cordova on 11/4/22.  ---------------------------------------------------------------------------  --------------  Queta LEONARD  [phone]; OK to leave message on voicemail  ---------------------------------------------------------------------------  --------------  SCRIPT ANSWERS  Relationship to Patient?  Self

## 2023-01-30 DIAGNOSIS — M10.072 IDIOPATHIC GOUT OF LEFT ANKLE, UNSPECIFIED CHRONICITY: ICD-10-CM

## 2023-01-30 DIAGNOSIS — E78.5 HYPERLIPIDEMIA, UNSPECIFIED HYPERLIPIDEMIA TYPE: ICD-10-CM

## 2023-01-30 DIAGNOSIS — E11.22 TYPE 2 DIABETES MELLITUS WITH CHRONIC KIDNEY DISEASE, WITHOUT LONG-TERM CURRENT USE OF INSULIN, UNSPECIFIED CKD STAGE (HCC): Primary | ICD-10-CM

## 2023-01-30 DIAGNOSIS — Z13.89 SCREENING FOR HEMATURIA OR PROTEINURIA: ICD-10-CM

## 2023-01-30 DIAGNOSIS — N18.30 STAGE 3 CHRONIC KIDNEY DISEASE, UNSPECIFIED WHETHER STAGE 3A OR 3B CKD (HCC): ICD-10-CM

## 2023-01-30 DIAGNOSIS — I10 ESSENTIAL HYPERTENSION: ICD-10-CM

## 2023-01-31 LAB
AVG GLU, 10930: 118 MG/DL (ref 91–123)
BACTERIA,BACTU: NEGATIVE
BILIRUB UR QL: NEGATIVE
CLARITY: CLEAR
COLOR UR: YELLOW
EPITHELIAL,EPSU: ABNORMAL /HPF
GLUCOSE UR QL: ABNORMAL MG/DL
HBA1C MFR BLD HPLC: 5.8 % (ref 4.8–5.6)
HGB UR QL STRIP: NEGATIVE
HYLINE CAST, 6014: ABNORMAL /LPF (ref 0–2)
KETONES UR QL STRIP.AUTO: NEGATIVE MG/DL
LEUKOCYTE ESTERASE: NEGATIVE
NITRITE UR QL STRIP.AUTO: NEGATIVE
PH UR STRIP: 5.5 PH (ref 5–8)
PROT UR QL STRIP: ABNORMAL MG/DL
RBC #/AREA URNS HPF: ABNORMAL /HPF
SP GR UR: 1.02 (ref 1–1.03)
UROBILINOGEN UR STRIP-MCNC: 0.2 MG/DL
WBC URNS QL MICRO: ABNORMAL /HPF (ref 0–2)

## 2023-03-09 ENCOUNTER — OFFICE VISIT (OUTPATIENT)
Facility: CLINIC | Age: 58
End: 2023-03-09
Payer: COMMERCIAL

## 2023-03-09 VITALS
TEMPERATURE: 98.2 F | BODY MASS INDEX: 41.15 KG/M2 | OXYGEN SATURATION: 96 % | RESPIRATION RATE: 18 BRPM | HEART RATE: 67 BPM | WEIGHT: 262.2 LBS | SYSTOLIC BLOOD PRESSURE: 107 MMHG | DIASTOLIC BLOOD PRESSURE: 69 MMHG | HEIGHT: 67 IN

## 2023-03-09 DIAGNOSIS — E11.22 TYPE 2 DIABETES MELLITUS WITH STAGE 3 CHRONIC KIDNEY DISEASE, WITHOUT LONG-TERM CURRENT USE OF INSULIN, UNSPECIFIED WHETHER STAGE 3A OR 3B CKD (HCC): ICD-10-CM

## 2023-03-09 DIAGNOSIS — R81 GLUCOSURIA: ICD-10-CM

## 2023-03-09 DIAGNOSIS — I10 ESSENTIAL (PRIMARY) HYPERTENSION: ICD-10-CM

## 2023-03-09 DIAGNOSIS — E78.2 MIXED HYPERLIPIDEMIA: ICD-10-CM

## 2023-03-09 DIAGNOSIS — F31.62 BIPOLAR DISORDER, CURRENT EPISODE MIXED, MODERATE (HCC): Primary | ICD-10-CM

## 2023-03-09 DIAGNOSIS — N18.30 TYPE 2 DIABETES MELLITUS WITH STAGE 3 CHRONIC KIDNEY DISEASE, WITHOUT LONG-TERM CURRENT USE OF INSULIN, UNSPECIFIED WHETHER STAGE 3A OR 3B CKD (HCC): ICD-10-CM

## 2023-03-09 DIAGNOSIS — Z12.5 SCREENING FOR PROSTATE CANCER: ICD-10-CM

## 2023-03-09 DIAGNOSIS — R35.0 FREQUENCY OF URINATION: ICD-10-CM

## 2023-03-09 DIAGNOSIS — I25.5 ISCHEMIC CARDIOMYOPATHY: ICD-10-CM

## 2023-03-09 DIAGNOSIS — I50.22 CHRONIC SYSTOLIC (CONGESTIVE) HEART FAILURE (HCC): ICD-10-CM

## 2023-03-09 PROCEDURE — 3078F DIAST BP <80 MM HG: CPT | Performed by: NURSE PRACTITIONER

## 2023-03-09 PROCEDURE — 99213 OFFICE O/P EST LOW 20 MIN: CPT | Performed by: NURSE PRACTITIONER

## 2023-03-09 PROCEDURE — 3074F SYST BP LT 130 MM HG: CPT | Performed by: NURSE PRACTITIONER

## 2023-03-09 PROCEDURE — 3044F HG A1C LEVEL LT 7.0%: CPT | Performed by: NURSE PRACTITIONER

## 2023-03-09 SDOH — ECONOMIC STABILITY: INCOME INSECURITY: IN THE LAST 12 MONTHS, WAS THERE A TIME WHEN YOU WERE NOT ABLE TO PAY THE MORTGAGE OR RENT ON TIME?: NO

## 2023-03-09 SDOH — ECONOMIC STABILITY: HOUSING INSECURITY
IN THE LAST 12 MONTHS, WAS THERE A TIME WHEN YOU DID NOT HAVE A STEADY PLACE TO SLEEP OR SLEPT IN A SHELTER (INCLUDING NOW)?: NO

## 2023-03-09 SDOH — ECONOMIC STABILITY: TRANSPORTATION INSECURITY
IN THE PAST 12 MONTHS, HAS LACK OF TRANSPORTATION KEPT YOU FROM MEETINGS, WORK, OR FROM GETTING THINGS NEEDED FOR DAILY LIVING?: NO

## 2023-03-09 SDOH — ECONOMIC STABILITY: FOOD INSECURITY: WITHIN THE PAST 12 MONTHS, YOU WORRIED THAT YOUR FOOD WOULD RUN OUT BEFORE YOU GOT MONEY TO BUY MORE.: NEVER TRUE

## 2023-03-09 SDOH — ECONOMIC STABILITY: FOOD INSECURITY: WITHIN THE PAST 12 MONTHS, THE FOOD YOU BOUGHT JUST DIDN'T LAST AND YOU DIDN'T HAVE MONEY TO GET MORE.: NEVER TRUE

## 2023-03-09 SDOH — ECONOMIC STABILITY: TRANSPORTATION INSECURITY
IN THE PAST 12 MONTHS, HAS THE LACK OF TRANSPORTATION KEPT YOU FROM MEDICAL APPOINTMENTS OR FROM GETTING MEDICATIONS?: NO

## 2023-03-09 ASSESSMENT — PATIENT HEALTH QUESTIONNAIRE - PHQ9
SUM OF ALL RESPONSES TO PHQ QUESTIONS 1-9: 0
SUM OF ALL RESPONSES TO PHQ QUESTIONS 1-9: 0
SUM OF ALL RESPONSES TO PHQ9 QUESTIONS 1 & 2: 0
1. LITTLE INTEREST OR PLEASURE IN DOING THINGS: 0
SUM OF ALL RESPONSES TO PHQ QUESTIONS 1-9: 0
SUM OF ALL RESPONSES TO PHQ QUESTIONS 1-9: 0
2. FEELING DOWN, DEPRESSED OR HOPELESS: 0

## 2023-03-09 ASSESSMENT — SOCIAL DETERMINANTS OF HEALTH (SDOH): HOW HARD IS IT FOR YOU TO PAY FOR THE VERY BASICS LIKE FOOD, HOUSING, MEDICAL CARE, AND HEATING?: NOT HARD AT ALL

## 2023-03-09 NOTE — PROGRESS NOTES
Room 16    When asked if patient has any concerns he  would like to address with ROCIO Alexander patient states no . Did patient bring someone? No     Did the patient have DME equipment? No     Did you take your medication today? Yes       1. \"Have you been to the ER, urgent care clinic since your last visit? Hospitalized since your last visit? \" No     2. \"Have you seen or consulted any other health care providers outside of the [address] [place] [address] since your last visit? \" No     3. For patients aged 39-70: Has the patient had a colonoscopy / FIT/ Cologuard? Yes      If the patient is female:    4. For patients aged 41-77: Has the patient had a mammogram within the past 2 years? N/A      5. For patients aged 21-65: Has the patient had a pap smear? {Cancer Care Gap present? N/A      PHQ-9  3/9/2023   Little interest or pleasure in doing things 0   Little interest or pleasure in doing things -   Feeling down, depressed, or hopeless 0   PHQ-2 Score 0   Total Score PHQ 2 -   PHQ-9 Total Score 0          Health Maintenance Due   Topic Date Due    HIV screen  Never done    Diabetic retinal exam  Never done    Hepatitis B vaccine (1 of 3 - Risk 3-dose series) Never done    Shingles vaccine (1 of 2) Never done    COVID-19 Vaccine (5 - Booster for Moderna series) 11/29/2022         Who is the primary learner? Patient    What is the preferred language for health care of the primary learner? ENGLISH    How does the primary learner prefer to learn new concepts?  READING    Answered By Noni Saravia    Relationship to Learner SELF

## 2023-03-09 NOTE — PROGRESS NOTES
Nuris Dominguez is a 59 y.o. male and presents with Annual Exam       Assessment/Plan:    1. Bipolar disorder, current episode mixed, moderate ([facility]  2. Type 2 diabetes mellitus with stage 3 chronic kidney disease, without long-term current use of insulin, unspecified whether stage 3a or 3b CKD ([facility]  3. Mixed hyperlipidemia  4. Essential (primary) hypertension  5. Ischemic cardiomyopathy  6. Chronic systolic (congestive) heart failure (HCC)  7. Screening for prostate cancer  -     PSA Screening; Future  8. Glucosuria  -     Urinalysis; Future  9. Frequency of urination  -     Urinalysis; Future       Follow up and disposition:   Return in about 3 months (around 6/9/2023). Health Maintenance:   Health Maintenance   Topic Date Due    Diabetic retinal exam  Never done    Shingles vaccine (1 of 2) Never done    COVID-19 Vaccine (5 - Booster for Moderna series) 11/29/2022    GFR test (Diabetes, CKD 3-4, OR last GFR 15-59)  04/21/2023    Hepatitis B vaccine (1 of 3 - Risk 3-dose series) 03/09/2024 (Originally 5/1/1984)    HIV screen  03/09/2024 (Originally [birth year]1/1980)    Diabetic foot exam  11/04/2023    Diabetic Alb to Cr ratio (uACR) test  11/04/2023    A1C test (Diabetic or Prediabetic)  01/30/2024    Lipids  01/30/2024    Depression Monitoring  03/09/2024    Colorectal Cancer Screen  11/17/2025    DTaP/Tdap/Td vaccine (2 - Td or Tdap) 10/23/2029    Flu vaccine  Completed    Pneumococcal 0-64 years Vaccine  Completed    Hepatitis A vaccine  Aged Out    Hib vaccine  Aged Out    Meningococcal (ACWY) vaccine  Aged Out    Hepatitis C screen  Discontinued        Subjective:    Labs obtained prior to visit? Yes  Reviewed with patient? yes    Seeing cardiology, psychiatry, and ophthalmology     Eye exam 2 months ago  --need records     ROS:     Review of Systems   Constitutional: Negative. Negative for chills and fever. HENT: Negative. Respiratory: Negative.   Negative for chest tightness, shortness of breath and

## 2023-03-10 LAB
BILIRUB SERPL-MCNC: NEGATIVE MG/DL
BLOOD: NEGATIVE
CLARITY: CLEAR
COLOR: YELLOW
GLUCOSE: ABNORMAL MG/DL
KETONES, URINE: NEGATIVE MG/DL
LEUKOCYTE ESTERASE, URINE: NEGATIVE
NITRITE, URINE: NEGATIVE
PH, URINE: 5.5 PH (ref 5–8)
PROSTATE SPECIFIC ANTIGEN: 0.8 NG/ML
PROTEIN UA: NEGATIVE MG/DL
SPECIFIC GRAVITY: 1.02 (ref 1–1.03)
UROBILINOGEN: 0.2 MG/DL

## 2023-03-16 DIAGNOSIS — I25.5 ISCHEMIC CARDIOMYOPATHY: ICD-10-CM

## 2023-03-16 DIAGNOSIS — I50.22 CHRONIC SYSTOLIC (CONGESTIVE) HEART FAILURE (HCC): ICD-10-CM

## 2023-03-16 RX ORDER — CARVEDILOL 25 MG/1
TABLET ORAL
Qty: 180 TABLET | Refills: 1 | Status: SHIPPED | OUTPATIENT
Start: 2023-03-16

## 2023-03-23 ASSESSMENT — ENCOUNTER SYMPTOMS
WHEEZING: 0
GASTROINTESTINAL NEGATIVE: 1
CHEST TIGHTNESS: 0
RESPIRATORY NEGATIVE: 1
SHORTNESS OF BREATH: 0

## 2023-03-26 DIAGNOSIS — I50.22 CHRONIC SYSTOLIC (CONGESTIVE) HEART FAILURE (HCC): ICD-10-CM

## 2023-03-26 RX ORDER — NIFEDIPINE 60 MG/1
TABLET, FILM COATED, EXTENDED RELEASE ORAL
Qty: 90 TABLET | Refills: 1 | Status: SHIPPED | OUTPATIENT
Start: 2023-03-26

## 2023-06-02 DIAGNOSIS — E78.5 HYPERLIPIDEMIA, UNSPECIFIED: ICD-10-CM

## 2023-06-02 RX ORDER — ALLOPURINOL 100 MG/1
TABLET ORAL
COMMUNITY
Start: 2023-03-06 | End: 2023-06-09 | Stop reason: SDUPTHER

## 2023-06-02 RX ORDER — ROSUVASTATIN CALCIUM 10 MG/1
TABLET, COATED ORAL
Qty: 90 TABLET | Refills: 1 | Status: SHIPPED | OUTPATIENT
Start: 2023-06-02

## 2023-06-02 RX ORDER — ZIPRASIDONE HYDROCHLORIDE 60 MG/1
CAPSULE ORAL
COMMUNITY
Start: 2023-03-16

## 2023-06-08 NOTE — TELEPHONE ENCOUNTER
Patient needs a Rx refill on his allopurinol 100 mg tablet.     Please send medication to [facility] on [address]

## 2023-06-09 ENCOUNTER — TELEPHONE (OUTPATIENT)
Facility: CLINIC | Age: 58
End: 2023-06-09

## 2023-06-09 ENCOUNTER — OFFICE VISIT (OUTPATIENT)
Facility: CLINIC | Age: 58
End: 2023-06-09
Payer: COMMERCIAL

## 2023-06-09 VITALS
HEIGHT: 67 IN | BODY MASS INDEX: 39.08 KG/M2 | HEART RATE: 65 BPM | TEMPERATURE: 97.7 F | WEIGHT: 249 LBS | RESPIRATION RATE: 18 BRPM | OXYGEN SATURATION: 94 % | DIASTOLIC BLOOD PRESSURE: 61 MMHG | SYSTOLIC BLOOD PRESSURE: 121 MMHG

## 2023-06-09 DIAGNOSIS — I10 ESSENTIAL (PRIMARY) HYPERTENSION: ICD-10-CM

## 2023-06-09 DIAGNOSIS — B35.1 TOENAIL FUNGUS: ICD-10-CM

## 2023-06-09 DIAGNOSIS — I25.5 ISCHEMIC CARDIOMYOPATHY: ICD-10-CM

## 2023-06-09 DIAGNOSIS — F31.62 BIPOLAR DISORDER, CURRENT EPISODE MIXED, MODERATE (HCC): ICD-10-CM

## 2023-06-09 DIAGNOSIS — N18.31 STAGE 3A CHRONIC KIDNEY DISEASE (HCC): ICD-10-CM

## 2023-06-09 DIAGNOSIS — R35.0 FREQUENCY OF URINATION: ICD-10-CM

## 2023-06-09 DIAGNOSIS — I50.22 CHRONIC SYSTOLIC (CONGESTIVE) HEART FAILURE (HCC): Primary | ICD-10-CM

## 2023-06-09 DIAGNOSIS — M79.89 LEG SWELLING: ICD-10-CM

## 2023-06-09 DIAGNOSIS — R06.02 SHORTNESS OF BREATH ON EXERTION: ICD-10-CM

## 2023-06-09 PROCEDURE — 3074F SYST BP LT 130 MM HG: CPT | Performed by: NURSE PRACTITIONER

## 2023-06-09 PROCEDURE — 3078F DIAST BP <80 MM HG: CPT | Performed by: NURSE PRACTITIONER

## 2023-06-09 PROCEDURE — 99214 OFFICE O/P EST MOD 30 MIN: CPT | Performed by: NURSE PRACTITIONER

## 2023-06-09 RX ORDER — ALLOPURINOL 100 MG/1
TABLET ORAL
Qty: 90 TABLET | Refills: 1 | Status: SHIPPED | OUTPATIENT
Start: 2023-06-09

## 2023-06-09 RX ORDER — CHLORTHALIDONE 25 MG/1
25 TABLET ORAL DAILY
Qty: 30 TABLET | Refills: 1 | Status: SHIPPED | OUTPATIENT
Start: 2023-06-09

## 2023-06-09 ASSESSMENT — ENCOUNTER SYMPTOMS
STRIDOR: 0
ABDOMINAL PAIN: 0
BLOOD IN STOOL: 0
GASTROINTESTINAL NEGATIVE: 1
CONSTIPATION: 0
SHORTNESS OF BREATH: 1
WHEEZING: 0
BACK PAIN: 0
COUGH: 0
CHEST TIGHTNESS: 0

## 2023-06-09 ASSESSMENT — PATIENT HEALTH QUESTIONNAIRE - PHQ9
SUM OF ALL RESPONSES TO PHQ QUESTIONS 1-9: 0
SUM OF ALL RESPONSES TO PHQ QUESTIONS 1-9: 0
SUM OF ALL RESPONSES TO PHQ9 QUESTIONS 1 & 2: 0
2. FEELING DOWN, DEPRESSED OR HOPELESS: 0
1. LITTLE INTEREST OR PLEASURE IN DOING THINGS: 0
SUM OF ALL RESPONSES TO PHQ QUESTIONS 1-9: 0
SUM OF ALL RESPONSES TO PHQ QUESTIONS 1-9: 0

## 2023-06-09 NOTE — TELEPHONE ENCOUNTER
Pt will be coming in on 6/16 at 1045 for a Nurse Visit and will be also getting Xrays done same day.

## 2023-06-10 LAB — B-TYPE NATRIURETIC PEPTIDE: 1025 PG/ML

## 2023-06-11 LAB
A/G RATIO: 1.4 RATIO (ref 1.1–2.6)
ALBUMIN SERPL-MCNC: 4.2 G/DL (ref 3.5–5)
ALP BLD-CCNC: 85 U/L (ref 40–125)
ALT SERPL-CCNC: 17 U/L (ref 5–40)
ANION GAP SERPL CALCULATED.3IONS-SCNC: 16 MMOL/L (ref 3–15)
AST SERPL-CCNC: 17 U/L (ref 10–37)
B-TYPE NATRIURETIC PEPTIDE: 1099 PG/ML
BILIRUB SERPL-MCNC: 0.3 MG/DL (ref 0.2–1.2)
BUN BLDV-MCNC: 24 MG/DL (ref 6–22)
CALCIUM SERPL-MCNC: 8.8 MG/DL (ref 8.4–10.5)
CHLORIDE BLD-SCNC: 103 MMOL/L (ref 98–110)
CO2: 24 MMOL/L (ref 20–32)
CREAT SERPL-MCNC: 1.8 MG/DL (ref 0.8–1.6)
GLOBULIN: 3.1 G/DL (ref 2–4)
GLOMERULAR FILTRATION RATE: 42 ML/MIN/1.73 SQ.M.
GLUCOSE: 111 MG/DL (ref 70–99)
POTASSIUM SERPL-SCNC: 4.2 MMOL/L (ref 3.5–5.5)
PRO BNP, N-TERMINAL: ABNORMAL
SODIUM BLD-SCNC: 143 MMOL/L (ref 133–145)
TOTAL PROTEIN: 7.3 G/DL (ref 6.2–8.1)

## 2023-06-16 ENCOUNTER — NURSE ONLY (OUTPATIENT)
Facility: CLINIC | Age: 58
End: 2023-06-16

## 2023-06-16 VITALS — HEIGHT: 67 IN | BODY MASS INDEX: 39 KG/M2 | RESPIRATION RATE: 18 BRPM

## 2023-07-01 DIAGNOSIS — M79.89 LEG SWELLING: ICD-10-CM

## 2023-07-01 DIAGNOSIS — I50.22 CHRONIC SYSTOLIC (CONGESTIVE) HEART FAILURE (HCC): ICD-10-CM

## 2023-07-03 RX ORDER — CHLORTHALIDONE 25 MG/1
TABLET ORAL
Qty: 30 TABLET | Refills: 1 | OUTPATIENT
Start: 2023-07-03

## 2023-08-21 ENCOUNTER — OFFICE VISIT (OUTPATIENT)
Age: 58
End: 2023-08-21

## 2023-08-21 VITALS
OXYGEN SATURATION: 94 % | SYSTOLIC BLOOD PRESSURE: 117 MMHG | WEIGHT: 252 LBS | DIASTOLIC BLOOD PRESSURE: 77 MMHG | BODY MASS INDEX: 39.55 KG/M2 | HEART RATE: 61 BPM | HEIGHT: 67 IN

## 2023-08-21 DIAGNOSIS — I51.7 LVH (LEFT VENTRICULAR HYPERTROPHY): ICD-10-CM

## 2023-08-21 DIAGNOSIS — F31.9 BIPOLAR AFFECTIVE DISORDER, REMISSION STATUS UNSPECIFIED (HCC): ICD-10-CM

## 2023-08-21 DIAGNOSIS — Z86.73 HISTORY OF CVA (CEREBROVASCULAR ACCIDENT): ICD-10-CM

## 2023-08-21 DIAGNOSIS — I42.9 CARDIOMYOPATHY, UNSPECIFIED TYPE (HCC): ICD-10-CM

## 2023-08-21 DIAGNOSIS — I50.22 CHRONIC SYSTOLIC (CONGESTIVE) HEART FAILURE (HCC): Primary | ICD-10-CM

## 2023-08-21 DIAGNOSIS — N18.31 STAGE 3A CHRONIC KIDNEY DISEASE (CKD) (HCC): ICD-10-CM

## 2023-08-21 DIAGNOSIS — I10 PRIMARY HYPERTENSION: ICD-10-CM

## 2023-08-21 ASSESSMENT — PATIENT HEALTH QUESTIONNAIRE - PHQ9
5. POOR APPETITE OR OVEREATING: 0
10. IF YOU CHECKED OFF ANY PROBLEMS, HOW DIFFICULT HAVE THESE PROBLEMS MADE IT FOR YOU TO DO YOUR WORK, TAKE CARE OF THINGS AT HOME, OR GET ALONG WITH OTHER PEOPLE: 0
SUM OF ALL RESPONSES TO PHQ QUESTIONS 1-9: 0
SUM OF ALL RESPONSES TO PHQ QUESTIONS 1-9: 0
1. LITTLE INTEREST OR PLEASURE IN DOING THINGS: 0
4. FEELING TIRED OR HAVING LITTLE ENERGY: 0
SUM OF ALL RESPONSES TO PHQ QUESTIONS 1-9: 0
6. FEELING BAD ABOUT YOURSELF - OR THAT YOU ARE A FAILURE OR HAVE LET YOURSELF OR YOUR FAMILY DOWN: 0
SUM OF ALL RESPONSES TO PHQ QUESTIONS 1-9: 0
9. THOUGHTS THAT YOU WOULD BE BETTER OFF DEAD, OR OF HURTING YOURSELF: 0
2. FEELING DOWN, DEPRESSED OR HOPELESS: 0
8. MOVING OR SPEAKING SO SLOWLY THAT OTHER PEOPLE COULD HAVE NOTICED. OR THE OPPOSITE, BEING SO FIGETY OR RESTLESS THAT YOU HAVE BEEN MOVING AROUND A LOT MORE THAN USUAL: 0
3. TROUBLE FALLING OR STAYING ASLEEP: 0
7. TROUBLE CONCENTRATING ON THINGS, SUCH AS READING THE NEWSPAPER OR WATCHING TELEVISION: 0
SUM OF ALL RESPONSES TO PHQ9 QUESTIONS 1 & 2: 0

## 2023-09-13 PROBLEM — N18.31 STAGE 3A CHRONIC KIDNEY DISEASE (CKD) (HCC): Status: ACTIVE | Noted: 2023-09-13

## 2023-09-13 PROBLEM — F31.9 BIPOLAR DISORDER (HCC): Status: ACTIVE | Noted: 2023-09-13

## 2023-09-13 PROBLEM — I10 PRIMARY HYPERTENSION: Status: ACTIVE | Noted: 2023-09-13

## 2023-09-13 PROBLEM — I51.7 LVH (LEFT VENTRICULAR HYPERTROPHY): Status: ACTIVE | Noted: 2023-09-13

## 2023-09-13 PROBLEM — Z86.73 HISTORY OF CVA (CEREBROVASCULAR ACCIDENT): Status: ACTIVE | Noted: 2023-09-13

## 2023-09-13 ASSESSMENT — ENCOUNTER SYMPTOMS
WHEEZING: 0
SHORTNESS OF BREATH: 1
COUGH: 0
CHEST TIGHTNESS: 0

## 2023-09-13 NOTE — PROGRESS NOTES
Assessment/Plan:       ICD-10-CM    1. Chronic systolic (congestive) heart failure (HCC/patient primarily had diastolic dysfunction by echo in 2015. There is an echocardiogram done in 10/2018 demonstrating an EF of 60%. Diffuse thickening of the aortic valve cusps. Mild concentric LVH. We will update echocardiogram.  Return in 4 to 6 weeks I50.22 EKG 12 Lead     Echo (TTE) complete with contrast, bubble, strain, and 3D PRN    Echocardiogram 2017; EF 59%. Moderate diastolic dysfunction. Moderate concentric LVH. Mild MR      2. Cardiomyopathy, unspecified type ([address])  I42.9 EKG 12 Lead      3. History of CVA (cerebrovascular accident)  Z86.73       4. Bipolar affective disorder, remission status unspecified ([address])  F31.9       5. Primary hypertension  I10       6. Stage 3a chronic kidney disease (CKD) ([facility])  N18.31       7. LVH (left ventricular hypertrophy), moderate in the past. I51.7                Subjective:   Valvin and is in the office today for cardiac evaluation. He is a 76-year-old hypertensive diabetic man that was diagnosed with diastolic heart failure in approximately 2015. .  The patient was previously seen on 11/24/2015. At that time, he had just been hospitalized for an acute cerebrovascular accident. He reported a previous cardiac catheterization done when he was in his 35s presents did not show any significant blockage. The patient believes that he has had a heart attack prior to 2015. In the office today, he reports increasing shortness of breath that began approximately 3 months ago. He has noticed significant increasing shortness of breath  with any kind of effort including walking to the mailbox or walking the dog any distance. He has had no PND or orthopnea. He has had no chest pain. He has had some minor swelling of his ankles. He has had no palpitations.       Patient's cardiac risk factors are: dyslipidemia, pre-- diabetes mellitus, hypertension    Past Med, Surg, Fam, Soc Hx

## 2023-09-20 DIAGNOSIS — I50.22 CHRONIC SYSTOLIC (CONGESTIVE) HEART FAILURE (HCC): ICD-10-CM

## 2023-09-20 RX ORDER — NIFEDIPINE 60 MG/1
TABLET, FILM COATED, EXTENDED RELEASE ORAL
Qty: 90 TABLET | Refills: 1 | OUTPATIENT
Start: 2023-09-20

## 2023-09-22 DIAGNOSIS — I50.22 CHRONIC SYSTOLIC (CONGESTIVE) HEART FAILURE (HCC): ICD-10-CM

## 2023-09-22 RX ORDER — NIFEDIPINE 60 MG/1
60 TABLET, FILM COATED, EXTENDED RELEASE ORAL DAILY
Qty: 90 TABLET | Refills: 1 | Status: SHIPPED | OUTPATIENT
Start: 2023-09-22

## 2023-09-25 ENCOUNTER — OFFICE VISIT (OUTPATIENT)
Facility: CLINIC | Age: 58
End: 2023-09-25
Payer: COMMERCIAL

## 2023-09-25 VITALS
SYSTOLIC BLOOD PRESSURE: 111 MMHG | RESPIRATION RATE: 18 BRPM | TEMPERATURE: 98.1 F | HEART RATE: 65 BPM | OXYGEN SATURATION: 94 % | HEIGHT: 67 IN | DIASTOLIC BLOOD PRESSURE: 63 MMHG | BODY MASS INDEX: 39.3 KG/M2 | WEIGHT: 250.4 LBS

## 2023-09-25 DIAGNOSIS — Z23 ENCOUNTER FOR IMMUNIZATION: Primary | ICD-10-CM

## 2023-09-25 DIAGNOSIS — I50.22 CHRONIC SYSTOLIC (CONGESTIVE) HEART FAILURE (HCC): ICD-10-CM

## 2023-09-25 DIAGNOSIS — I10 ESSENTIAL (PRIMARY) HYPERTENSION: ICD-10-CM

## 2023-09-25 DIAGNOSIS — N18.31 STAGE 3A CHRONIC KIDNEY DISEASE (HCC): ICD-10-CM

## 2023-09-25 PROCEDURE — 3074F SYST BP LT 130 MM HG: CPT | Performed by: NURSE PRACTITIONER

## 2023-09-25 PROCEDURE — 90750 HZV VACC RECOMBINANT IM: CPT | Performed by: NURSE PRACTITIONER

## 2023-09-25 PROCEDURE — 99212 OFFICE O/P EST SF 10 MIN: CPT | Performed by: NURSE PRACTITIONER

## 2023-09-25 PROCEDURE — 90471 IMMUNIZATION ADMIN: CPT | Performed by: NURSE PRACTITIONER

## 2023-09-25 PROCEDURE — 3078F DIAST BP <80 MM HG: CPT | Performed by: NURSE PRACTITIONER

## 2023-09-25 ASSESSMENT — ENCOUNTER SYMPTOMS
GASTROINTESTINAL NEGATIVE: 1
SHORTNESS OF BREATH: 0
CONSTIPATION: 0
STRIDOR: 0
CHEST TIGHTNESS: 0
COUGH: 0
BLOOD IN STOOL: 0
WHEEZING: 0
BACK PAIN: 0
ABDOMINAL PAIN: 0

## 2023-09-25 ASSESSMENT — PATIENT HEALTH QUESTIONNAIRE - PHQ9
2. FEELING DOWN, DEPRESSED OR HOPELESS: 0
SUM OF ALL RESPONSES TO PHQ QUESTIONS 1-9: 0
SUM OF ALL RESPONSES TO PHQ9 QUESTIONS 1 & 2: 0
SUM OF ALL RESPONSES TO PHQ QUESTIONS 1-9: 0
1. LITTLE INTEREST OR PLEASURE IN DOING THINGS: 0

## 2023-09-25 NOTE — PROGRESS NOTES
Elana Staples is a 59 y.o. male and presents with Follow-up       Assessment/Plan:    1. Encounter for immunization  -     Zoster, SHINGRIX, (18 yrs +), IM  2. Essential (primary) hypertension  3. Chronic systolic (congestive) heart failure [facility])  Comments:  seeing Dr. Norah Horan for echocardiogram 12/2023  4. Stage 3a chronic kidney disease ([facility])  Comments:  seeing Dr. Marianela Mnedoza for CKD         Follow up and disposition:   Return in about 2 months (around 11/25/2023) for annual physical/2nd shingles vaccine . Subjective:    Labs obtained prior to visit? No  Reviewed with patient? N/A    Patient states SOB has improved with walking dog     ROS:     Review of Systems   Constitutional:  Negative for chills and fatigue. HENT: Negative. Respiratory:  Negative for cough, chest tightness, shortness of breath, wheezing and stridor. Shortness of breath improved; able to walk with dog without any difficulty    Cardiovascular:  Negative for chest pain, palpitations and leg swelling. Gastrointestinal: Negative. Negative for abdominal pain, blood in stool and constipation. Genitourinary:  Positive for frequency. Negative for dysuria and flank pain. At night   Musculoskeletal: Negative. Negative for back pain and joint swelling. Neurological: Negative. Negative for dizziness and headaches. Psychiatric/Behavioral:  Positive for dysphoric mood. Negative for hallucinations, self-injury, sleep disturbance and suicidal ideas. The patient is not nervous/anxious and is not hyperactive. The problem list was updated as a part of today's visit.   Patient Active Problem List   Diagnosis    Depression    Stage 3 chronic kidney disease ([facility])    CAP (community acquired pneumonia)    Cardiomyopathy ([address]    Vitamin D deficiency    Mental confusion    Atypical chest pain    Essential (primary) hypertension    CHF (congestive heart failure) (HCC)    Gout    Pre-diabetes    Erectile dysfunction

## 2023-09-25 NOTE — PROGRESS NOTES
Denisepedrito Valdivia presents today for   Chief Complaint   Patient presents with    Follow-up       Is someone accompanying this pt? No     Is the patient using any DME equipment during OV? No     Depression Screening:       No data to display                Learning Assessment:  No flowsheet data found. Fall Risk  No flowsheet data found. ADL       No data to display                Health Maintenance reviewed and discussed and ordered per Provider. Health Maintenance Due   Topic Date Due    Diabetic retinal exam  Never done    Shingles vaccine (1 of 2) Never done    Flu vaccine (1) 08/01/2023   . Coordination of Care:  1. Have you been to the ER, urgent care clinic since your last visit? Hospitalized since your last visit? No     2. Have you seen or consulted any other health care providers outside of the [address] [place] [address] since your last visit? Include any pap smears or colon screening. No    3. For patients aged 43-73: Has the patient had a colonoscopy / FIT/ Cologuard? Yes       If the patient is female:    4. For patients aged 43-66: Has the patient had a mammogram within the past 2 years? N/a      5. For patients aged 21-65: Has the patient had a pap smear?  N/a

## 2023-12-01 ENCOUNTER — NURSE ONLY (OUTPATIENT)
Facility: CLINIC | Age: 58
End: 2023-12-01
Payer: COMMERCIAL

## 2023-12-01 DIAGNOSIS — Z23 ENCOUNTER FOR IMMUNIZATION: Primary | ICD-10-CM

## 2023-12-01 PROCEDURE — 90471 IMMUNIZATION ADMIN: CPT | Performed by: NURSE PRACTITIONER

## 2023-12-01 PROCEDURE — 90750 HZV VACC RECOMBINANT IM: CPT | Performed by: NURSE PRACTITIONER

## 2023-12-01 ASSESSMENT — PATIENT HEALTH QUESTIONNAIRE - PHQ9
SUM OF ALL RESPONSES TO PHQ QUESTIONS 1-9: 0
1. LITTLE INTEREST OR PLEASURE IN DOING THINGS: 0
SUM OF ALL RESPONSES TO PHQ QUESTIONS 1-9: 0
SUM OF ALL RESPONSES TO PHQ QUESTIONS 1-9: 0
SUM OF ALL RESPONSES TO PHQ9 QUESTIONS 1 & 2: 0
SUM OF ALL RESPONSES TO PHQ QUESTIONS 1-9: 0
2. FEELING DOWN, DEPRESSED OR HOPELESS: 0

## 2023-12-01 NOTE — PROGRESS NOTES
Patient was given VIS for review, consent was obtained and per orders of ARMIDA. Cesar Márquez , injection of Shingrix vaccine  given by [facility]. Patient observed. No signs nor symptoms of any adverse reactions. Patient tolerated injection well.

## 2023-12-12 DIAGNOSIS — E78.5 HYPERLIPIDEMIA, UNSPECIFIED: ICD-10-CM

## 2023-12-12 RX ORDER — ROSUVASTATIN CALCIUM 10 MG/1
TABLET, COATED ORAL
Qty: 90 TABLET | Refills: 0 | Status: SHIPPED | OUTPATIENT
Start: 2023-12-12

## 2023-12-18 LAB — LEFT VENTRICULAR EJECTION FRACTION, EXTERNAL: 48

## 2024-01-29 ENCOUNTER — OFFICE VISIT (OUTPATIENT)
Facility: CLINIC | Age: 59
End: 2024-01-29
Payer: COMMERCIAL

## 2024-01-29 VITALS
HEIGHT: 67 IN | BODY MASS INDEX: 39.99 KG/M2 | TEMPERATURE: 98 F | DIASTOLIC BLOOD PRESSURE: 80 MMHG | OXYGEN SATURATION: 95 % | HEART RATE: 70 BPM | SYSTOLIC BLOOD PRESSURE: 138 MMHG | WEIGHT: 254.8 LBS | RESPIRATION RATE: 18 BRPM

## 2024-01-29 DIAGNOSIS — N18.30 TYPE 2 DIABETES MELLITUS WITH STAGE 3 CHRONIC KIDNEY DISEASE, WITHOUT LONG-TERM CURRENT USE OF INSULIN, UNSPECIFIED WHETHER STAGE 3A OR 3B CKD (HCC): ICD-10-CM

## 2024-01-29 DIAGNOSIS — I50.9 CONGESTIVE HEART FAILURE, UNSPECIFIED HF CHRONICITY, UNSPECIFIED HEART FAILURE TYPE (HCC): ICD-10-CM

## 2024-01-29 DIAGNOSIS — E11.22 TYPE 2 DIABETES MELLITUS WITH STAGE 3 CHRONIC KIDNEY DISEASE, WITHOUT LONG-TERM CURRENT USE OF INSULIN, UNSPECIFIED WHETHER STAGE 3A OR 3B CKD (HCC): ICD-10-CM

## 2024-01-29 DIAGNOSIS — I42.0 CARDIOMYOPATHY, DILATED, NONISCHEMIC (HCC): ICD-10-CM

## 2024-01-29 DIAGNOSIS — R05.2 SUBACUTE COUGH: Primary | ICD-10-CM

## 2024-01-29 DIAGNOSIS — I10 ESSENTIAL (PRIMARY) HYPERTENSION: ICD-10-CM

## 2024-01-29 DIAGNOSIS — J40 BRONCHITIS: ICD-10-CM

## 2024-01-29 DIAGNOSIS — F31.62 BIPOLAR DISORDER, CURRENT EPISODE MIXED, MODERATE (HCC): ICD-10-CM

## 2024-01-29 PROBLEM — F31.9 BIPOLAR DISORDER (HCC): Status: RESOLVED | Noted: 2023-09-13 | Resolved: 2024-01-29

## 2024-01-29 LAB
EXP DATE SOLUTION: NORMAL
EXP DATE SWAB: NORMAL
EXPIRATION DATE: NORMAL
HBA1C MFR BLD: 5.5 %
LOT NUMBER POC: NORMAL
LOT NUMBER SOLUTION: NORMAL
LOT NUMBER SWAB: NORMAL
QUICKVUE INFLUENZA TEST: NEGATIVE
SARS-COV-2 RNA, POC: NEGATIVE
VALID INTERNAL CONTROL, POC: YES

## 2024-01-29 PROCEDURE — 87804 INFLUENZA ASSAY W/OPTIC: CPT | Performed by: NURSE PRACTITIONER

## 2024-01-29 PROCEDURE — 3075F SYST BP GE 130 - 139MM HG: CPT | Performed by: NURSE PRACTITIONER

## 2024-01-29 PROCEDURE — 83036 HEMOGLOBIN GLYCOSYLATED A1C: CPT | Performed by: NURSE PRACTITIONER

## 2024-01-29 PROCEDURE — 87635 SARS-COV-2 COVID-19 AMP PRB: CPT | Performed by: NURSE PRACTITIONER

## 2024-01-29 PROCEDURE — 3079F DIAST BP 80-89 MM HG: CPT | Performed by: NURSE PRACTITIONER

## 2024-01-29 PROCEDURE — 99214 OFFICE O/P EST MOD 30 MIN: CPT | Performed by: NURSE PRACTITIONER

## 2024-01-29 RX ORDER — METHYLPREDNISOLONE 4 MG/1
TABLET ORAL
Qty: 1 KIT | Refills: 0 | Status: SHIPPED | OUTPATIENT
Start: 2024-01-29 | End: 2024-02-04

## 2024-01-29 RX ORDER — BENZONATATE 100 MG/1
100 CAPSULE ORAL 3 TIMES DAILY PRN
Qty: 30 CAPSULE | Refills: 0 | Status: SHIPPED | OUTPATIENT
Start: 2024-01-29 | End: 2024-02-08

## 2024-01-29 RX ORDER — DOXYCYCLINE HYCLATE 100 MG
100 TABLET ORAL 2 TIMES DAILY
Qty: 14 TABLET | Refills: 0 | Status: SHIPPED | OUTPATIENT
Start: 2024-01-29 | End: 2024-02-05

## 2024-01-29 ASSESSMENT — ENCOUNTER SYMPTOMS
BLOOD IN STOOL: 0
ABDOMINAL PAIN: 0
COUGH: 1
GASTROINTESTINAL NEGATIVE: 1
CONSTIPATION: 0
STRIDOR: 0
CHEST TIGHTNESS: 0
SHORTNESS OF BREATH: 0
WHEEZING: 0
BACK PAIN: 0

## 2024-01-29 ASSESSMENT — PATIENT HEALTH QUESTIONNAIRE - PHQ9
2. FEELING DOWN, DEPRESSED OR HOPELESS: 0
SUM OF ALL RESPONSES TO PHQ9 QUESTIONS 1 & 2: 0
SUM OF ALL RESPONSES TO PHQ QUESTIONS 1-9: 0
1. LITTLE INTEREST OR PLEASURE IN DOING THINGS: 0

## 2024-01-29 NOTE — PROGRESS NOTES
Patient states he has been to the eye doctor. Patient states he has tested for covid twice and they came back  negative.     Don Cooper presents today for   Chief Complaint   Patient presents with    Cough     2 weeks        Is someone accompanying this pt? no    Is the patient using any DME equipment during OV? No     Depression Screening:       No data to display                Learning Assessment:      Fall Risk      ADL       No data to display                Health Maintenance reviewed and discussed and ordered per Provider.    Health Maintenance Due   Topic Date Due    Diabetic retinal exam  Never done    Respiratory Syncytial Virus (RSV) Pregnant or age 60 yrs+ (1 - 1-dose 60+ series) Never done    Flu vaccine (1) 08/01/2023    COVID-19 Vaccine (7 - 2023-24 season) 09/01/2023    Diabetic foot exam  11/04/2023    Diabetic Alb to Cr ratio (uACR) test  11/04/2023    A1C test (Diabetic or Prediabetic)  01/30/2024    Lipids  01/30/2024   .    \"Have you been to the ER, urgent care clinic since your last visit?  Hospitalized since your last visit?\"    no    “Have you seen or consulted any other health care providers outside of [facility] since your last visit?”    no

## 2024-01-29 NOTE — PROGRESS NOTES
[address] [place] [address]               [phone]      Don Cooper is a 58 y.o. male and presents with Cough (2 weeks )       Assessment/Plan:    1. Subacute cough  Comments:  will check for covid and flu and patient is negative for both flu and covid  --suspect bronchitis with cough x 2 weeks; treat with medrol pack and doxycycline  Orders:  -     AMB POC RAPID INFLUENZA TEST  -     AMB POC COVID-19 COV  2. Type 2 diabetes mellitus with stage 3 chronic kidney disease, without long-term current use of insulin, unspecified whether stage 3a or 3b CKD (HCC)  Comments:  followed by Dr. Jones for CKD  Assessment & Plan:   Well-controlled, continue current medications  A1c 5.5%  Orders:  -     AMB POC HEMOGLOBIN A1C  3. Bipolar disorder, current episode mixed, moderate (HCC)  Assessment & Plan:   Monitored by specialist- no acute findings meriting change in the plan  4. Essential (primary) hypertension  5. Congestive heart failure, unspecified HF chronicity, unspecified heart failure type ([facility])  Assessment & Plan:   Monitored by specialist- no acute findings meriting change in the plan  Seeing Dr. Maria  Recent echo: For the indication of chronic systolic heart failure, findings are LVEF  48% with WMA as described.   6. Cardiomyopathy, dilated, nonischemic (HCC)  7. Bronchitis  -     methylPREDNISolone (MEDROL DOSEPACK) 4 MG tablet; Take by mouth., Disp-1 kit, R-0Normal  -     doxycycline hyclate (VIBRA-TABS) 100 MG tablet; Take 1 tablet by mouth 2 times daily for 7 days, Disp-14 tablet, R-0Normal  -     benzonatate (TESSALON) 100 MG capsule; Take 1 capsule by mouth 3 times daily as needed for Cough, Disp-30 capsule, R-0Normal         Follow up and disposition:   No follow-ups on file.      Subjective:    Labs obtained prior to visit? No  Reviewed with patient? N/A    Patient tested x 2 at home and tested negative for covid  --+ sick contacts with wife   --denies chest

## 2024-02-05 ENCOUNTER — OFFICE VISIT (OUTPATIENT)
Dept: FAMILY MEDICINE CLINIC | Facility: CLINIC | Age: 59
End: 2024-02-05
Payer: COMMERCIAL

## 2024-02-05 VITALS
SYSTOLIC BLOOD PRESSURE: 126 MMHG | OXYGEN SATURATION: 99 % | HEART RATE: 81 BPM | HEIGHT: 70 IN | TEMPERATURE: 99.1 F | DIASTOLIC BLOOD PRESSURE: 76 MMHG | WEIGHT: 244 LBS | RESPIRATION RATE: 20 BRPM | BODY MASS INDEX: 34.93 KG/M2

## 2024-02-05 DIAGNOSIS — F33.1 MODERATE EPISODE OF RECURRENT MAJOR DEPRESSIVE DISORDER (HCC): ICD-10-CM

## 2024-02-05 DIAGNOSIS — R05.3 CHRONIC COUGH: ICD-10-CM

## 2024-02-05 DIAGNOSIS — Z76.89 ESTABLISHING CARE WITH NEW DOCTOR, ENCOUNTER FOR: Primary | ICD-10-CM

## 2024-02-05 DIAGNOSIS — I10 ESSENTIAL (PRIMARY) HYPERTENSION: ICD-10-CM

## 2024-02-05 PROCEDURE — 3078F DIAST BP <80 MM HG: CPT | Performed by: STUDENT IN AN ORGANIZED HEALTH CARE EDUCATION/TRAINING PROGRAM

## 2024-02-05 PROCEDURE — 99214 OFFICE O/P EST MOD 30 MIN: CPT | Performed by: STUDENT IN AN ORGANIZED HEALTH CARE EDUCATION/TRAINING PROGRAM

## 2024-02-05 PROCEDURE — 3074F SYST BP LT 130 MM HG: CPT | Performed by: STUDENT IN AN ORGANIZED HEALTH CARE EDUCATION/TRAINING PROGRAM

## 2024-02-05 ASSESSMENT — ENCOUNTER SYMPTOMS
CHEST TIGHTNESS: 0
DIARRHEA: 0
SHORTNESS OF BREATH: 0
COUGH: 0
NAUSEA: 0
VOMITING: 0

## 2024-02-05 NOTE — PROGRESS NOTES
mouth daily, Disp: 30 tablet, Rfl: 1    carvedilol (COREG) 25 MG tablet, TAKE 1 TABLET BY MOUTH EVERY DAY WITH MEALS, Disp: 180 tablet, Rfl: 1    aspirin 81 MG EC tablet, Take 1 tablet by mouth daily, Disp: , Rfl:     FLUoxetine (PROZAC) 10 MG capsule, TAKE 1 CAPSULE BY MOUTH EVERY DAY, Disp: , Rfl:     LORazepam (ATIVAN) 1 MG tablet, Take 1 tablet by mouth every 8 hours as needed., Disp: , Rfl:     empagliflozin (JARDIANCE) 10 MG tablet, Take 1 tablet by mouth daily (Patient not taking: Reported on 2/5/2024), Disp: , Rfl:       Review of Systems   Constitutional:  Negative for activity change and appetite change.   HENT:  Negative for congestion.    Respiratory:  Negative for cough, chest tightness and shortness of breath.    Cardiovascular:  Negative for chest pain and palpitations.   Gastrointestinal:  Negative for diarrhea, nausea and vomiting.   Neurological:  Negative for dizziness and seizures.   Psychiatric/Behavioral:  Negative for behavioral problems.        Physical Exam  Constitutional:       General: He is not in acute distress.     Appearance: Normal appearance. He is not ill-appearing.   HENT:      Head: Normocephalic and atraumatic.   Eyes:      Extraocular Movements: Extraocular movements intact.      Pupils: Pupils are equal, round, and reactive to light.   Cardiovascular:      Rate and Rhythm: Normal rate and regular rhythm.      Heart sounds: No murmur heard.  Pulmonary:      Effort: Pulmonary effort is normal. No respiratory distress.      Breath sounds: Normal breath sounds. No wheezing.   Musculoskeletal:         General: No swelling.   Skin:     General: Skin is warm and dry.   Neurological:      General: No focal deficit present.      Mental Status: He is alert and oriented to person, place, and time.   Psychiatric:         Mood and Affect: Mood normal.         Behavior: Behavior normal.          ASSESSMENT and PLAN    Don was seen today for new patient.    Diagnoses and all orders for

## 2024-02-07 ENCOUNTER — TELEPHONE (OUTPATIENT)
Dept: FAMILY MEDICINE CLINIC | Facility: CLINIC | Age: 59
End: 2024-02-07

## 2024-02-07 NOTE — TELEPHONE ENCOUNTER
Spoke w/ pt's wife who wanted to know if patient's CXR results was received. She stated that her  had the CXR done yesterday morning at [facility] and was informed by one of staff that we received the results. After reviewing chart I was unable to locate the results. Attempted to call the center for results and was transferred to the voicemail. Will try again in the morning.    [phone]

## 2024-02-08 NOTE — TELEPHONE ENCOUNTER
Pt called and stated he was given his chest XRAY results and told he has walking pneumonia and needs a prescription [facility] on [facility].

## 2024-02-08 NOTE — TELEPHONE ENCOUNTER
Spoke w/ pt to inform that our office received the results and PCP reviewed them and per PCP X-ray results was normal. Pt verbalized understanding.

## 2024-02-14 ENCOUNTER — TELEPHONE (OUTPATIENT)
Dept: FAMILY MEDICINE CLINIC | Facility: CLINIC | Age: 59
End: 2024-02-14

## 2024-02-14 RX ORDER — BENZONATATE 100 MG/1
100-200 CAPSULE ORAL 3 TIMES DAILY PRN
Qty: 60 CAPSULE | Refills: 0 | Status: SHIPPED | OUTPATIENT
Start: 2024-02-14 | End: 2024-02-21

## 2024-02-14 NOTE — TELEPHONE ENCOUNTER
Patient called because he was seen in office on 2/5/2024 and had concerns about a cough. Pt has a chest x-ray for possible pneumonia on 2/7/2025 and was resulted as normal. Pt stated that he still has the cough that is bothering him very much. Pt would like to know If an RX can be sent for this concern.

## 2024-02-21 RX ORDER — ALLOPURINOL 100 MG/1
TABLET ORAL
Qty: 90 TABLET | Refills: 1 | OUTPATIENT
Start: 2024-02-21

## 2024-03-06 RX ORDER — ALLOPURINOL 100 MG/1
TABLET ORAL
Qty: 90 TABLET | Refills: 1 | OUTPATIENT
Start: 2024-03-06

## 2024-03-07 NOTE — TELEPHONE ENCOUNTER
Pt called requesting a refill to be sent to the [facility] on file.    Requested Prescriptions     Pending Prescriptions Disp Refills    allopurinol (ZYLOPRIM) 100 MG tablet 90 tablet 1     Sig: TAKE 1 TABLET BY MOUTH EVERYDAY AT BEDTIME

## 2024-03-08 RX ORDER — ALLOPURINOL 100 MG/1
TABLET ORAL
Qty: 90 TABLET | Refills: 1 | Status: SHIPPED | OUTPATIENT
Start: 2024-03-08

## 2024-03-08 NOTE — TELEPHONE ENCOUNTER
Medication refill request    allopurinol (ZYLOPRIM) 100 MG tablet  Qty: 90 Rf:1   Last filled : 06/09/2023    Future Appointments   Date Time Provider Department Center   6/5/2024 10:30 AM Aliza Hopson DO [facility] BS AMB

## 2024-03-18 DIAGNOSIS — I50.22 CHRONIC SYSTOLIC (CONGESTIVE) HEART FAILURE (HCC): ICD-10-CM

## 2024-03-18 NOTE — TELEPHONE ENCOUNTER
PT called with RxRefill Request.  Requested Prescriptions     Refused Prescriptions Disp Refills    NIFEdipine (ADALAT CC) 60 MG extended release tablet [Pharmacy Med Name: NIFEDIPINE ER 60 MG TABLET] 90 tablet 1     Sig: TAKE 1 TABLET BY MOUTH EVERY DAY     Refused By: DONNY MIR     Reason for Refusal: Patient no longer under prescriber care

## 2024-03-19 DIAGNOSIS — I50.22 CHRONIC SYSTOLIC (CONGESTIVE) HEART FAILURE (HCC): ICD-10-CM

## 2024-04-03 ENCOUNTER — OFFICE VISIT (OUTPATIENT)
Dept: FAMILY MEDICINE CLINIC | Facility: CLINIC | Age: 59
End: 2024-04-03
Payer: COMMERCIAL

## 2024-04-03 VITALS
HEART RATE: 75 BPM | WEIGHT: 250.2 LBS | HEIGHT: 70 IN | SYSTOLIC BLOOD PRESSURE: 138 MMHG | BODY MASS INDEX: 35.82 KG/M2 | RESPIRATION RATE: 18 BRPM | DIASTOLIC BLOOD PRESSURE: 79 MMHG | OXYGEN SATURATION: 93 % | TEMPERATURE: 98.1 F

## 2024-04-03 DIAGNOSIS — R06.2 WHEEZING: ICD-10-CM

## 2024-04-03 DIAGNOSIS — R06.02 SHORTNESS OF BREATH: Primary | ICD-10-CM

## 2024-04-03 DIAGNOSIS — I51.9 DECREASED LEFT VENTRICULAR SYSTOLIC FUNCTION: ICD-10-CM

## 2024-04-03 PROCEDURE — 94640 AIRWAY INHALATION TREATMENT: CPT | Performed by: FAMILY MEDICINE

## 2024-04-03 PROCEDURE — 3075F SYST BP GE 130 - 139MM HG: CPT | Performed by: FAMILY MEDICINE

## 2024-04-03 PROCEDURE — 3078F DIAST BP <80 MM HG: CPT | Performed by: FAMILY MEDICINE

## 2024-04-03 PROCEDURE — 99214 OFFICE O/P EST MOD 30 MIN: CPT | Performed by: FAMILY MEDICINE

## 2024-04-03 RX ORDER — CARVEDILOL 3.12 MG/1
3.12 TABLET ORAL 2 TIMES DAILY
Qty: 60 TABLET | Refills: 0 | Status: SHIPPED | OUTPATIENT
Start: 2024-04-03

## 2024-04-03 RX ORDER — ALBUTEROL SULFATE 90 UG/1
2 AEROSOL, METERED RESPIRATORY (INHALATION) EVERY 4 HOURS PRN
Qty: 18 G | Refills: 1 | Status: SHIPPED | OUTPATIENT
Start: 2024-04-03

## 2024-04-03 RX ORDER — LISINOPRIL 10 MG/1
10 TABLET ORAL DAILY
Qty: 30 TABLET | Refills: 0 | Status: SHIPPED | OUTPATIENT
Start: 2024-04-03

## 2024-04-03 RX ORDER — ALBUTEROL SULFATE 2.5 MG/3ML
2.5 SOLUTION RESPIRATORY (INHALATION) ONCE
Status: COMPLETED | OUTPATIENT
Start: 2024-04-03 | End: 2024-04-03

## 2024-04-03 RX ADMIN — ALBUTEROL SULFATE 2.5 MG: 2.5 SOLUTION RESPIRATORY (INHALATION) at 14:55

## 2024-04-03 SDOH — ECONOMIC STABILITY: FOOD INSECURITY: WITHIN THE PAST 12 MONTHS, YOU WORRIED THAT YOUR FOOD WOULD RUN OUT BEFORE YOU GOT MONEY TO BUY MORE.: NEVER TRUE

## 2024-04-03 SDOH — ECONOMIC STABILITY: FOOD INSECURITY: WITHIN THE PAST 12 MONTHS, THE FOOD YOU BOUGHT JUST DIDN'T LAST AND YOU DIDN'T HAVE MONEY TO GET MORE.: NEVER TRUE

## 2024-04-03 SDOH — ECONOMIC STABILITY: INCOME INSECURITY: HOW HARD IS IT FOR YOU TO PAY FOR THE VERY BASICS LIKE FOOD, HOUSING, MEDICAL CARE, AND HEATING?: NOT HARD AT ALL

## 2024-04-03 ASSESSMENT — ENCOUNTER SYMPTOMS
COUGH: 0
WHEEZING: 1
SHORTNESS OF BREATH: 1

## 2024-04-03 NOTE — PROGRESS NOTES
HISTORY OF PRESENT ILLNESS  Don Cooper  is a 58 y.o. y.o. male    He reports persistent wheezing and shortness of breath.  He was seen here with persistent cough 2 months ago and a chest x-ray was ordered but not completed because the patient subsequently indicated that his insurance would not cover the imaging study if done here and that he would get it done elsewhere.  He indicates that he did have the x-ray done but does not recall the name of the facility.  He was told he did not have pneumonia.  He has a history of community-acquired pneumonia in 2015.  He is a non-smoker with no history of asthma.  He has a history of an echocardiogram in December 2023 showing low normal left ventricular systolic function with an ejection fraction of 48% decreased from 60% in a study from 10/18/2018.        Mr#: [id number]      Past Medical History:   Diagnosis Date    Acute renal failure with tubular necrosis ([facility]) 05/12/2012    Anemia     Bipolar disorder ([facility]) 09/13/2023    Bipolar II disorder ([facility])     CAP (community acquired pneumonia) 03/21/2015    Formatting of this note might be different from the original.  Gram negative, atypical, aspiration suspected       Cardiomyopathy ([facility])     Cerebrovascular accident ([facility]) 08/16/2012    CHF (congestive heart failure) ([facility])     CVA (cerebral infarction)     Depression     ED (erectile dysfunction)     Gastritis     Gout     HTN (hypertension)     Hyperlipemia, mixed     Hypoxia     Kidney disease, chronic, stage III (moderate, EGFR 30-59 ml/min) ([facility])     Pre-diabetes     Sleep apnea     Stage 3 chronic kidney disease ([facility]) 01/30/2018    Vitamin D deficiency        Past Surgical History:   Procedure Laterality Date    ANTERIOR CRUCIATE LIGAMENT REPAIR      APPENDECTOMY  2004    became septic from a rupture       Family History   Problem Relation Age of Onset    No Known Problems Mother     Cancer Father         prostate    Diabetes Father     Hypertension Father     No Known

## 2024-04-03 NOTE — PROGRESS NOTES
Don Cooper is a 58 y.o. male (: 1965) presenting to address:    Chief Complaint   Patient presents with    Wheezing     SOB a night       Vitals:    24 1431   BP: 138/79   Pulse: 75   Resp: 18   Temp: 98.1 °F (36.7 °C)   SpO2: 90%       \"Have you been to the ER, urgent care clinic since your last visit?  Hospitalized since your last visit?\"    NO    “Have you seen or consulted any other health care providers outside of [facility] since your last visit?”    NO

## 2024-04-03 NOTE — PATIENT INSTRUCTIONS
Current Status:  Exam without wheezes or crackles but minimal improvement after albuterol nebulizer with SpO2 up from 90% to 93% and minimal subjective improvement  Decreased left ventricular function may be a factor    Plan:  Begin lisinopril 10 mg daily  Begin carvedilol 3.125 mg twice daily  Albuterol inhaler 2 puffs up to every 4 hours if needed for wheezing  Please schedule follow-up with Dr. Hopson in 2 weeks  Return sooner with new or worsening symptoms  Please always arrive at least 15 minutes before your scheduled appointment time.

## 2024-04-04 ENCOUNTER — TELEPHONE (OUTPATIENT)
Dept: FAMILY MEDICINE CLINIC | Facility: CLINIC | Age: 59
End: 2024-04-04

## 2024-04-04 NOTE — TELEPHONE ENCOUNTER
Pt called back inquiring if he could just get an alt RX that is covered [facility].

## 2024-04-04 NOTE — TELEPHONE ENCOUNTER
Pt came in yesterday for wheezing and SOB. PT was prescribed a nebulizer but his Ins won't cover it. Pt is requesting something for his wheezing, and also states that [facility] has faxed over paperwork in regards to situation. Please advise.

## 2024-04-08 RX ORDER — IPRATROPIUM BROMIDE AND ALBUTEROL SULFATE 2.5; .5 MG/3ML; MG/3ML
1 SOLUTION RESPIRATORY (INHALATION) EVERY 4 HOURS
Qty: 360 ML | Refills: 0 | Status: SHIPPED | OUTPATIENT
Start: 2024-04-08

## 2024-04-09 ENCOUNTER — TELEPHONE (OUTPATIENT)
Dept: FAMILY MEDICINE CLINIC | Facility: CLINIC | Age: 59
End: 2024-04-09

## 2024-04-09 NOTE — TELEPHONE ENCOUNTER
----- Message from Jenniffer Edmondson MA sent at 4/9/2024  8:41 AM EDT -----  Subject: Message to Provider    QUESTIONS  Information for Provider? Patient states that got the nebulizer solution   but he doesn't have a machine, He states that he doesn't know if someone   needs to contact his insurance so he can get this. Please advise   ---------------------------------------------------------------------------  --------------  CALL BACK INFO  [phone]; OK to leave message on voicemail  ---------------------------------------------------------------------------  --------------  SCRIPT ANSWERS  Relationship to Patient? Self

## 2024-04-09 NOTE — TELEPHONE ENCOUNTER
Pt called nurse triage line to make a sooner appt with his provider for c/o SOB, this nurse advised pt please go to the ER to be evaluated and treated if symptoms worsen before your appt.     Future Appointments   Date Time Provider Department Center   4/10/2024 10:15 AM Aliza Hopson DO [facility] AMB   4/17/2024 10:15 AM Aliza Hopson DO [facility] AMB   6/5/2024 10:30 AM Aliza Hopson DO [facility] AMB

## 2024-04-10 ENCOUNTER — HOSPITAL ENCOUNTER (OUTPATIENT)
Facility: HOSPITAL | Age: 59
Setting detail: SPECIMEN
Discharge: HOME | End: 2024-04-13
Payer: COMMERCIAL

## 2024-04-10 ENCOUNTER — OFFICE VISIT (OUTPATIENT)
Dept: FAMILY MEDICINE CLINIC | Facility: CLINIC | Age: 59
End: 2024-04-10
Payer: COMMERCIAL

## 2024-04-10 VITALS
TEMPERATURE: 98.1 F | RESPIRATION RATE: 18 BRPM | HEIGHT: 70 IN | DIASTOLIC BLOOD PRESSURE: 68 MMHG | OXYGEN SATURATION: 93 % | SYSTOLIC BLOOD PRESSURE: 118 MMHG | HEART RATE: 64 BPM | WEIGHT: 250.4 LBS | BODY MASS INDEX: 35.85 KG/M2

## 2024-04-10 DIAGNOSIS — R06.02 SHORTNESS OF BREATH: Primary | ICD-10-CM

## 2024-04-10 DIAGNOSIS — R06.02 SHORTNESS OF BREATH: ICD-10-CM

## 2024-04-10 DIAGNOSIS — Z11.4 SCREENING FOR HIV (HUMAN IMMUNODEFICIENCY VIRUS): ICD-10-CM

## 2024-04-10 DIAGNOSIS — R73.03 PREDIABETES: ICD-10-CM

## 2024-04-10 DIAGNOSIS — I50.9 CONGESTIVE HEART FAILURE, UNSPECIFIED HF CHRONICITY, UNSPECIFIED HEART FAILURE TYPE (HCC): ICD-10-CM

## 2024-04-10 LAB
ALBUMIN SERPL-MCNC: 4 G/DL (ref 3.4–5)
ALBUMIN/GLOB SERPL: 1 (ref 0.8–1.7)
ALP SERPL-CCNC: 82 U/L (ref 45–117)
ALT SERPL-CCNC: 30 U/L (ref 16–61)
ANION GAP SERPL CALC-SCNC: 6 MMOL/L (ref 3–18)
AST SERPL-CCNC: 16 U/L (ref 10–38)
BILIRUB SERPL-MCNC: 1 MG/DL (ref 0.2–1)
BUN SERPL-MCNC: 33 MG/DL (ref 7–18)
BUN/CREAT SERPL: 18 (ref 12–20)
CALCIUM SERPL-MCNC: 8.8 MG/DL (ref 8.5–10.1)
CHLORIDE SERPL-SCNC: 106 MMOL/L (ref 100–111)
CHOLEST SERPL-MCNC: 121 MG/DL
CO2 SERPL-SCNC: 25 MMOL/L (ref 21–32)
CREAT SERPL-MCNC: 1.88 MG/DL (ref 0.6–1.3)
EST. AVERAGE GLUCOSE BLD GHB EST-MCNC: 105 MG/DL
GLOBULIN SER CALC-MCNC: 4.2 G/DL (ref 2–4)
GLUCOSE SERPL-MCNC: 87 MG/DL (ref 74–99)
HBA1C MFR BLD: 5.3 % (ref 4.2–5.6)
HDLC SERPL-MCNC: 54 MG/DL (ref 40–60)
HDLC SERPL: 2.2 (ref 0–5)
LDLC SERPL CALC-MCNC: 44 MG/DL (ref 0–100)
LIPID PANEL: NORMAL
NT PRO BNP: 938 PG/ML (ref 0–900)
POTASSIUM SERPL-SCNC: 3.9 MMOL/L (ref 3.5–5.5)
PROT SERPL-MCNC: 8.2 G/DL (ref 6.4–8.2)
SODIUM SERPL-SCNC: 137 MMOL/L (ref 136–145)
TRIGL SERPL-MCNC: 115 MG/DL
VLDLC SERPL CALC-MCNC: 23 MG/DL

## 2024-04-10 PROCEDURE — 83036 HEMOGLOBIN GLYCOSYLATED A1C: CPT

## 2024-04-10 PROCEDURE — 83880 ASSAY OF NATRIURETIC PEPTIDE: CPT

## 2024-04-10 PROCEDURE — 3078F DIAST BP <80 MM HG: CPT | Performed by: STUDENT IN AN ORGANIZED HEALTH CARE EDUCATION/TRAINING PROGRAM

## 2024-04-10 PROCEDURE — 80053 COMPREHEN METABOLIC PANEL: CPT

## 2024-04-10 PROCEDURE — 80061 LIPID PANEL: CPT

## 2024-04-10 PROCEDURE — 3074F SYST BP LT 130 MM HG: CPT | Performed by: STUDENT IN AN ORGANIZED HEALTH CARE EDUCATION/TRAINING PROGRAM

## 2024-04-10 PROCEDURE — 36415 COLL VENOUS BLD VENIPUNCTURE: CPT

## 2024-04-10 PROCEDURE — 87389 HIV-1 AG W/HIV-1&-2 AB AG IA: CPT

## 2024-04-10 PROCEDURE — 99214 OFFICE O/P EST MOD 30 MIN: CPT | Performed by: STUDENT IN AN ORGANIZED HEALTH CARE EDUCATION/TRAINING PROGRAM

## 2024-04-10 RX ORDER — FUROSEMIDE 20 MG/1
20 TABLET ORAL DAILY
Qty: 10 TABLET | Refills: 0 | Status: SHIPPED | OUTPATIENT
Start: 2024-04-10

## 2024-04-10 ASSESSMENT — ENCOUNTER SYMPTOMS
SHORTNESS OF BREATH: 1
CHEST TIGHTNESS: 0
VOMITING: 0
COUGH: 0
WHEEZING: 1
NAUSEA: 0
DIARRHEA: 0

## 2024-04-10 NOTE — PROGRESS NOTES
[facility]    HISTORY OF PRESENT ILLNESS  Don Cooper  is a 58 y.o. y.o. male here for acute care.    SOB  -worse on exertion or laying down, sleeping in a chair because when lay down he will cough  -denies any swelling legs  -SpO2 95% at rest, drops to 92% while walking  -acute care visit w/ Dr. Franco 4/3/24  -prescribed nebulizer and albuterol inhaler  -wheezing better with inhaler  -has rx for nebulizer solution but no nebulizer machine  -prior to that appt he completed doxycycline and prednisone pack from urgent care with no improvement  -covid and flu neg at that time  -no hx of asthma  -hx of CHF, following Dr. Maria, last echo 12/23 showed EF 48%    preDM  -hgb A1c 5.8 1/23 5.8  -diet controlled    Mr#: [id number]      Past Medical History:   Diagnosis Date    Acute renal failure with tubular necrosis (HCC) 05/12/2012    Anemia     Bipolar disorder ([facility]) 09/13/2023    Bipolar II disorder ([facility])     CAP (community acquired pneumonia) 03/21/2015    Formatting of this note might be different from the original.  Gram negative, atypical, aspiration suspected       Cardiomyopathy (HCC)     Cerebrovascular accident (HCC) 08/16/2012    CHF (congestive heart failure) ([facility])     CVA (cerebral infarction)     Depression     ED (erectile dysfunction)     Gastritis     Gout     HTN (hypertension)     Hyperlipemia, mixed     Hypoxia     Kidney disease, chronic, stage III (moderate, EGFR 30-59 ml/min) ([facility])     Pre-diabetes     Sleep apnea     Stage 3 chronic kidney disease ([facility]) 01/30/2018    Vitamin D deficiency        Past Surgical History:   Procedure Laterality Date    ANTERIOR CRUCIATE LIGAMENT REPAIR      APPENDECTOMY  2004    became septic from a rupture       Family History   Problem Relation Age of Onset    No Known Problems Mother     Cancer Father         prostate    Diabetes Father     Hypertension Father     No Known Problems Sister     No Known Problems Brother     No Known Problems

## 2024-04-10 NOTE — PROGRESS NOTES
Don Cooper is a 58 y.o. male (: 1965) presenting to address:    Chief Complaint   Patient presents with    Shortness of Breath       Vitals:    04/10/24 1007   BP: 118/68   Pulse: 64   Resp: 18   Temp: 98.1 °F (36.7 °C)   SpO2: 96%       \"Have you been to the ER, urgent care clinic since your last visit?  Hospitalized since your last visit?\"    NO    “Have you seen or consulted any other health care providers outside of [facility] since your last visit?”    NO

## 2024-04-10 NOTE — PATIENT INSTRUCTIONS
Take Furosemide x 1 week to see if helps with possible fluid overload  Schedule pulmonary function test to determine if shortness of breath asthma or COPD related  Labs today  Nebulizer ordered

## 2024-04-11 LAB
HIV 1+2 AB+HIV1 P24 AG SERPL QL IA: NONREACTIVE
HIV 1/2 RESULT COMMENT: NORMAL

## 2024-04-17 ENCOUNTER — OFFICE VISIT (OUTPATIENT)
Dept: FAMILY MEDICINE CLINIC | Facility: CLINIC | Age: 59
End: 2024-04-17
Payer: COMMERCIAL

## 2024-04-17 VITALS
DIASTOLIC BLOOD PRESSURE: 65 MMHG | SYSTOLIC BLOOD PRESSURE: 104 MMHG | TEMPERATURE: 97.8 F | HEART RATE: 71 BPM | OXYGEN SATURATION: 98 % | WEIGHT: 253.4 LBS | RESPIRATION RATE: 18 BRPM | HEIGHT: 70 IN | BODY MASS INDEX: 36.28 KG/M2

## 2024-04-17 DIAGNOSIS — I50.9 CONGESTIVE HEART FAILURE, UNSPECIFIED HF CHRONICITY, UNSPECIFIED HEART FAILURE TYPE (HCC): Primary | ICD-10-CM

## 2024-04-17 DIAGNOSIS — R06.02 SOB (SHORTNESS OF BREATH): ICD-10-CM

## 2024-04-17 PROCEDURE — 3074F SYST BP LT 130 MM HG: CPT | Performed by: STUDENT IN AN ORGANIZED HEALTH CARE EDUCATION/TRAINING PROGRAM

## 2024-04-17 PROCEDURE — 3078F DIAST BP <80 MM HG: CPT | Performed by: STUDENT IN AN ORGANIZED HEALTH CARE EDUCATION/TRAINING PROGRAM

## 2024-04-17 PROCEDURE — 99213 OFFICE O/P EST LOW 20 MIN: CPT | Performed by: STUDENT IN AN ORGANIZED HEALTH CARE EDUCATION/TRAINING PROGRAM

## 2024-04-17 ASSESSMENT — ENCOUNTER SYMPTOMS
DIARRHEA: 0
SHORTNESS OF BREATH: 1
VOMITING: 0
NAUSEA: 0
COUGH: 0
CHEST TIGHTNESS: 0

## 2024-04-17 NOTE — PROGRESS NOTES
Don Cooper is a 58 y.o. male (: 1965) presenting to address:    Chief Complaint   Patient presents with    Medication Check       Vitals:    24 1017   BP: 104/65   Pulse: 71   Resp: 18   Temp: 97.8 °F (36.6 °C)   SpO2: 98%       \"Have you been to the ER, urgent care clinic since your last visit?  Hospitalized since your last visit?\"    NO    “Have you seen or consulted any other health care providers outside of [facility] since your last visit?”    NO           
heart failure (HCC)    Ischemic cardiomyopathy    Frequency of urination    Toenail fungus    Shortness of breath on exertion    History of CVA (cerebrovascular accident)    Primary hypertension    Stage 3a chronic kidney disease (HCC)    LVH (left ventricular hypertrophy)    Subacute cough    Bronchitis         Current Outpatient Medications:     furosemide (LASIX) 20 MG tablet, Take 1 tablet by mouth daily, Disp: 10 tablet, Rfl: 0    ipratropium 0.5 mg-albuterol 2.5 mg (DUONEB) 0.5-2.5 (3) MG/3ML SOLN nebulizer solution, Inhale 3 mLs into the lungs every 4 hours, Disp: 360 mL, Rfl: 0    lisinopril (PRINIVIL;ZESTRIL) 10 MG tablet, Take 1 tablet by mouth daily, Disp: 30 tablet, Rfl: 0    carvedilol (COREG) 3.125 MG tablet, Take 1 tablet by mouth 2 times daily, Disp: 60 tablet, Rfl: 0    albuterol sulfate HFA (VENTOLIN HFA) 108 (90 Base) MCG/ACT inhaler, Inhale 2 puffs into the lungs every 4 hours as needed for Wheezing or Shortness of Breath, Disp: 18 g, Rfl: 1    NIFEdipine (ADALAT CC) 60 MG extended release tablet, Take 1 tablet by mouth daily, Disp: 90 tablet, Rfl: 1    allopurinol (ZYLOPRIM) 100 MG tablet, TAKE 1 TABLET BY MOUTH EVERYDAY AT BEDTIME, Disp: 90 tablet, Rfl: 1    rosuvastatin (CRESTOR) 10 MG tablet, TAKE 1 TABLET BY MOUTH ONCE NIGHTLY, Disp: 90 tablet, Rfl: 0    chlorthalidone (HYGROTON) 25 MG tablet, Take 1 tablet by mouth daily, Disp: 30 tablet, Rfl: 1    carvedilol (COREG) 25 MG tablet, TAKE 1 TABLET BY MOUTH EVERY DAY WITH MEALS, Disp: 180 tablet, Rfl: 1    aspirin 81 MG EC tablet, Take 1 tablet by mouth daily, Disp: , Rfl:     FLUoxetine (PROZAC) 10 MG capsule, TAKE 1 CAPSULE BY MOUTH EVERY DAY, Disp: , Rfl:     LORazepam (ATIVAN) 1 MG tablet, Take 1 tablet by mouth every 8 hours as needed., Disp: , Rfl:       Review of Systems   Constitutional:  Negative for activity change and appetite change.   HENT:  Negative for congestion.    Respiratory:  Positive for shortness of breath. Negative for

## 2024-04-25 ENCOUNTER — TELEPHONE (OUTPATIENT)
Age: 59
End: 2024-04-25

## 2024-04-25 DIAGNOSIS — I51.9 DECREASED LEFT VENTRICULAR SYSTOLIC FUNCTION: ICD-10-CM

## 2024-04-25 NOTE — TELEPHONE ENCOUNTER
Patient called to confirm his next appt with Dr. Duarte. He asked if this info could be shared with his Cardio Care team. Patient states he's been doing breathing treatments but has been experiencing a lot of wheezing and shortness of breath and is asking if this could be related to a heart condition. Pt would like the opportunity to speak with his provider or an assistant. Can be reached at [phone].

## 2024-04-26 ENCOUNTER — TELEPHONE (OUTPATIENT)
Dept: FAMILY MEDICINE CLINIC | Facility: CLINIC | Age: 59
End: 2024-04-26

## 2024-04-26 ENCOUNTER — OFFICE VISIT (OUTPATIENT)
Dept: FAMILY MEDICINE CLINIC | Facility: CLINIC | Age: 59
End: 2024-04-26
Payer: COMMERCIAL

## 2024-04-26 VITALS
TEMPERATURE: 98.7 F | BODY MASS INDEX: 35.76 KG/M2 | WEIGHT: 249.8 LBS | DIASTOLIC BLOOD PRESSURE: 74 MMHG | OXYGEN SATURATION: 85 % | HEART RATE: 87 BPM | RESPIRATION RATE: 18 BRPM | SYSTOLIC BLOOD PRESSURE: 128 MMHG | HEIGHT: 70 IN

## 2024-04-26 DIAGNOSIS — R09.02 HYPOXIA: Primary | ICD-10-CM

## 2024-04-26 DIAGNOSIS — R06.03 ACUTE RESPIRATORY DISTRESS: ICD-10-CM

## 2024-04-26 PROCEDURE — 3078F DIAST BP <80 MM HG: CPT | Performed by: STUDENT IN AN ORGANIZED HEALTH CARE EDUCATION/TRAINING PROGRAM

## 2024-04-26 PROCEDURE — 99215 OFFICE O/P EST HI 40 MIN: CPT | Performed by: STUDENT IN AN ORGANIZED HEALTH CARE EDUCATION/TRAINING PROGRAM

## 2024-04-26 PROCEDURE — 3074F SYST BP LT 130 MM HG: CPT | Performed by: STUDENT IN AN ORGANIZED HEALTH CARE EDUCATION/TRAINING PROGRAM

## 2024-04-26 RX ORDER — LISINOPRIL 10 MG/1
10 TABLET ORAL DAILY
Qty: 90 TABLET | Refills: 1 | Status: SHIPPED | OUTPATIENT
Start: 2024-04-26

## 2024-04-26 RX ORDER — CARVEDILOL 3.12 MG/1
3.12 TABLET ORAL 2 TIMES DAILY
Qty: 180 TABLET | Refills: 1 | Status: SHIPPED | OUTPATIENT
Start: 2024-04-26

## 2024-04-26 RX ORDER — ZIPRASIDONE HYDROCHLORIDE 60 MG/1
60 CAPSULE ORAL 2 TIMES DAILY WITH MEALS
COMMUNITY
Start: 2024-04-22

## 2024-04-26 ASSESSMENT — ENCOUNTER SYMPTOMS
CHEST TIGHTNESS: 0
VOMITING: 0
DIARRHEA: 0
SHORTNESS OF BREATH: 1
WHEEZING: 1
COUGH: 0
NAUSEA: 0

## 2024-04-26 NOTE — TELEPHONE ENCOUNTER
Called patient to follow up on his concerns. Spoke with his wife, who stated that he is in the hospital. He was admitted for CHF exacerbation. Advised patient's wife to call us upon his discharge to schedule a post hospital follow up. She verbalized understanding.

## 2024-04-26 NOTE — PROGRESS NOTES
Don Cooper is a 58 y.o. male (: 1965) presenting to address:    Chief Complaint   Patient presents with    Shortness of Breath       Vitals:    24 0941   BP: 128/74   Pulse: 87   Resp: 18   Temp: 98.7 °F (37.1 °C)   SpO2: (!) 85%       \"Have you been to the ER, urgent care clinic since your last visit?  Hospitalized since your last visit?\"    NO    “Have you seen or consulted any other health care providers outside of [facility] since your last visit?”    NO            
disease    Bipolar disorder, current episode mixed, moderate (HCC)    Chronic systolic (congestive) heart failure (HCC)    Ischemic cardiomyopathy    Frequency of urination    Toenail fungus    Shortness of breath on exertion    History of CVA (cerebrovascular accident)    Primary hypertension    Stage 3a chronic kidney disease (HCC)    LVH (left ventricular hypertrophy)    Subacute cough    Bronchitis         Current Outpatient Medications:     ziprasidone (GEODON) 60 MG capsule, Take 1 capsule by mouth 2 times daily (with meals), Disp: , Rfl:     furosemide (LASIX) 20 MG tablet, Take 1 tablet by mouth daily, Disp: 10 tablet, Rfl: 0    ipratropium 0.5 mg-albuterol 2.5 mg (DUONEB) 0.5-2.5 (3) MG/3ML SOLN nebulizer solution, Inhale 3 mLs into the lungs every 4 hours, Disp: 360 mL, Rfl: 0    lisinopril (PRINIVIL;ZESTRIL) 10 MG tablet, Take 1 tablet by mouth daily, Disp: 30 tablet, Rfl: 0    carvedilol (COREG) 3.125 MG tablet, Take 1 tablet by mouth 2 times daily, Disp: 60 tablet, Rfl: 0    albuterol sulfate HFA (VENTOLIN HFA) 108 (90 Base) MCG/ACT inhaler, Inhale 2 puffs into the lungs every 4 hours as needed for Wheezing or Shortness of Breath, Disp: 18 g, Rfl: 1    NIFEdipine (ADALAT CC) 60 MG extended release tablet, Take 1 tablet by mouth daily, Disp: 90 tablet, Rfl: 1    allopurinol (ZYLOPRIM) 100 MG tablet, TAKE 1 TABLET BY MOUTH EVERYDAY AT BEDTIME, Disp: 90 tablet, Rfl: 1    rosuvastatin (CRESTOR) 10 MG tablet, TAKE 1 TABLET BY MOUTH ONCE NIGHTLY, Disp: 90 tablet, Rfl: 0    chlorthalidone (HYGROTON) 25 MG tablet, Take 1 tablet by mouth daily, Disp: 30 tablet, Rfl: 1    carvedilol (COREG) 25 MG tablet, TAKE 1 TABLET BY MOUTH EVERY DAY WITH MEALS, Disp: 180 tablet, Rfl: 1    aspirin 81 MG EC tablet, Take 1 tablet by mouth daily, Disp: , Rfl:     FLUoxetine (PROZAC) 10 MG capsule, TAKE 1 CAPSULE BY MOUTH EVERY DAY, Disp: , Rfl:     LORazepam (ATIVAN) 1 MG tablet, Take 1 tablet by mouth every 8 hours as needed.,

## 2024-04-26 NOTE — TELEPHONE ENCOUNTER
EMS came at 10:21 AM, Pt declined to go with EMS. Says his wife is on the way to bring him to the hospital. MD colón

## 2024-04-26 NOTE — TELEPHONE ENCOUNTER
Last refilled 4/6/24 for 30 days . Last ov today   Future Appointments   Date Time Provider Department Center   5/20/2024 11:15 AM Aliza Hopson DO [facility] BS AMB   6/12/2024  2:20 PM Jayde Duarte DO [facility] BS AMB   6/17/2024 10:30 AM Aliza Hopson DO [facility] BS AMB

## 2024-05-02 ENCOUNTER — TELEPHONE (OUTPATIENT)
Dept: FAMILY MEDICINE CLINIC | Facility: CLINIC | Age: 59
End: 2024-05-02

## 2024-05-02 NOTE — TELEPHONE ENCOUNTER
Mario Alberto from [facility] called stating that patient will be starting home health care on 5/5/224.>RANII

## 2024-05-08 ENCOUNTER — OFFICE VISIT (OUTPATIENT)
Dept: FAMILY MEDICINE CLINIC | Facility: CLINIC | Age: 59
End: 2024-05-08

## 2024-05-08 ENCOUNTER — OFFICE VISIT (OUTPATIENT)
Age: 59
End: 2024-05-08
Payer: COMMERCIAL

## 2024-05-08 VITALS
HEART RATE: 59 BPM | SYSTOLIC BLOOD PRESSURE: 101 MMHG | HEIGHT: 70 IN | BODY MASS INDEX: 34.22 KG/M2 | DIASTOLIC BLOOD PRESSURE: 66 MMHG | WEIGHT: 239 LBS | OXYGEN SATURATION: 95 %

## 2024-05-08 VITALS
TEMPERATURE: 97.8 F | BODY MASS INDEX: 34.22 KG/M2 | SYSTOLIC BLOOD PRESSURE: 91 MMHG | HEART RATE: 58 BPM | OXYGEN SATURATION: 97 % | HEIGHT: 70 IN | RESPIRATION RATE: 20 BRPM | DIASTOLIC BLOOD PRESSURE: 60 MMHG | WEIGHT: 239 LBS

## 2024-05-08 DIAGNOSIS — I10 PRIMARY HYPERTENSION: ICD-10-CM

## 2024-05-08 DIAGNOSIS — R06.02 SOB (SHORTNESS OF BREATH): ICD-10-CM

## 2024-05-08 DIAGNOSIS — I42.9 CARDIOMYOPATHY, UNSPECIFIED TYPE (HCC): Primary | ICD-10-CM

## 2024-05-08 DIAGNOSIS — Z09 HOSPITAL DISCHARGE FOLLOW-UP: Primary | ICD-10-CM

## 2024-05-08 DIAGNOSIS — I50.22 CHRONIC SYSTOLIC (CONGESTIVE) HEART FAILURE (HCC): ICD-10-CM

## 2024-05-08 DIAGNOSIS — Z86.73 HISTORY OF CVA (CEREBROVASCULAR ACCIDENT): ICD-10-CM

## 2024-05-08 DIAGNOSIS — G47.30 SLEEP APNEA, UNSPECIFIED TYPE: ICD-10-CM

## 2024-05-08 DIAGNOSIS — N18.31 STAGE 3A CHRONIC KIDNEY DISEASE (CKD) (HCC): ICD-10-CM

## 2024-05-08 PROBLEM — N18.9 CHRONIC KIDNEY DISEASE, UNSPECIFIED: Status: ACTIVE | Noted: 2024-05-08

## 2024-05-08 PROBLEM — N18.30 BENIGN HYPERTENSIVE HEART AND KIDNEY DISEASE WITH CHRONIC KIDNEY DISEASE, STAGE III (HCC): Status: ACTIVE | Noted: 2024-05-08

## 2024-05-08 PROBLEM — I50.23 ACUTE ON CHRONIC SYSTOLIC CONGESTIVE HEART FAILURE (HCC): Status: ACTIVE | Noted: 2024-04-26

## 2024-05-08 PROBLEM — I50.9 CHF EXACERBATION (HCC): Status: ACTIVE | Noted: 2024-04-26

## 2024-05-08 PROBLEM — I13.10 BENIGN HYPERTENSIVE HEART AND KIDNEY DISEASE WITH CHRONIC KIDNEY DISEASE, STAGE III (HCC): Status: ACTIVE | Noted: 2024-05-08

## 2024-05-08 PROCEDURE — 3078F DIAST BP <80 MM HG: CPT | Performed by: INTERNAL MEDICINE

## 2024-05-08 PROCEDURE — 99215 OFFICE O/P EST HI 40 MIN: CPT | Performed by: INTERNAL MEDICINE

## 2024-05-08 PROCEDURE — 93000 ELECTROCARDIOGRAM COMPLETE: CPT | Performed by: INTERNAL MEDICINE

## 2024-05-08 PROCEDURE — 3074F SYST BP LT 130 MM HG: CPT | Performed by: INTERNAL MEDICINE

## 2024-05-08 RX ORDER — FUROSEMIDE 20 MG/1
20 TABLET ORAL DAILY
Qty: 90 TABLET | Refills: 2 | Status: SHIPPED | OUTPATIENT
Start: 2024-05-08

## 2024-05-08 ASSESSMENT — PATIENT HEALTH QUESTIONNAIRE - PHQ9
7. TROUBLE CONCENTRATING ON THINGS, SUCH AS READING THE NEWSPAPER OR WATCHING TELEVISION: NOT AT ALL
3. TROUBLE FALLING OR STAYING ASLEEP: NOT AT ALL
2. FEELING DOWN, DEPRESSED OR HOPELESS: NOT AT ALL
10. IF YOU CHECKED OFF ANY PROBLEMS, HOW DIFFICULT HAVE THESE PROBLEMS MADE IT FOR YOU TO DO YOUR WORK, TAKE CARE OF THINGS AT HOME, OR GET ALONG WITH OTHER PEOPLE: NOT DIFFICULT AT ALL
9. THOUGHTS THAT YOU WOULD BE BETTER OFF DEAD, OR OF HURTING YOURSELF: NOT AT ALL
SUM OF ALL RESPONSES TO PHQ QUESTIONS 1-9: 0
4. FEELING TIRED OR HAVING LITTLE ENERGY: NOT AT ALL
5. POOR APPETITE OR OVEREATING: NOT AT ALL
SUM OF ALL RESPONSES TO PHQ QUESTIONS 1-9: 0
6. FEELING BAD ABOUT YOURSELF - OR THAT YOU ARE A FAILURE OR HAVE LET YOURSELF OR YOUR FAMILY DOWN: NOT AT ALL
SUM OF ALL RESPONSES TO PHQ QUESTIONS 1-9: 0
SUM OF ALL RESPONSES TO PHQ9 QUESTIONS 1 & 2: 0
8. MOVING OR SPEAKING SO SLOWLY THAT OTHER PEOPLE COULD HAVE NOTICED. OR THE OPPOSITE, BEING SO FIGETY OR RESTLESS THAT YOU HAVE BEEN MOVING AROUND A LOT MORE THAN USUAL: NOT AT ALL
1. LITTLE INTEREST OR PLEASURE IN DOING THINGS: NOT AT ALL
SUM OF ALL RESPONSES TO PHQ QUESTIONS 1-9: 0

## 2024-05-08 NOTE — PROGRESS NOTES
Don Cooper    Chief Complaint   Patient presents with    Follow-up   Overdue follow up for AECHF/ HFpEF    HPI    Don Cooper is a 59 y.o. with CHF/ HFpEF, dx in 2015 during an acute CVA. He has apparently even had a heart cath in his 30s which did not show significant disease.  He has seen my partner very sporadically in the past- his notes were obtained and reviewed.    As you know, patient comes today as he was discharged recently from the hospital for AECHF. He diuresed with IV Lasix 40 BID and sent home on po Lasix. Previous to hospital admission he was sleeping upright in a recliner and noticed increased LE edema.    He has sleep apnea which has not been treated for years. His wife gives majority of history/ answers questions. He has been weight daily so we reviewed these logs. No new complaints.    Past Medical History:   Diagnosis Date    Acute renal failure with tubular necrosis (HCC) 05/12/2012    Anemia     Bipolar disorder (HCC) 09/13/2023    Bipolar II disorder (HCC)     CAP (community acquired pneumonia) 03/21/2015    Formatting of this note might be different from the original.  Gram negative, atypical, aspiration suspected       Cardiomyopathy (HCC)     Cerebrovascular accident (HCC) 08/16/2012    CHF (congestive heart failure) (HCC)     CVA (cerebral infarction)     Depression     ED (erectile dysfunction)     Gastritis     Gout     HTN (hypertension)     Hyperlipemia, mixed     Hypoxia     Kidney disease, chronic, stage III (moderate, EGFR 30-59 ml/min) (HCC)     Pre-diabetes     Sleep apnea     Stage 3 chronic kidney disease (HCC) 01/30/2018    Vitamin D deficiency        Past Surgical History:   Procedure Laterality Date    ANTERIOR CRUCIATE LIGAMENT REPAIR      APPENDECTOMY  2004    became septic from a rupture       Current Outpatient Medications   Medication Sig Dispense Refill    furosemide (LASIX) 20 MG tablet Take 1 tablet by mouth daily 90 tablet 2    carvedilol (COREG) 3.125 MG

## 2024-05-08 NOTE — PATIENT INSTRUCTIONS
New Medication/Medication Changes/Refills      **please allow 24-48 hrs for medication to be escribed to pharmacy** If you need any refills on medications please contact your pharmacy so that the request can be escribed to the provider for review seven to 10 days prior to being out of medication.

## 2024-05-08 NOTE — PROGRESS NOTES
Don Cooper is a 59 y.o. male (: 1965) presenting to address:    Chief Complaint   Patient presents with    Follow-Up from Hospital     Sleep Study Test for CPAP  Pulmonology Referral please       Vitals:    24 1047   BP: 91/60   Pulse: 58   Resp: 20   Temp: 97.8 °F (36.6 °C)   SpO2: 97%       \"Have you been to the ER, urgent care clinic since your last visit?  Hospitalized since your last visit?\"    Yes, [facility]    “Have you seen or consulted any other health care providers outside of [facility] since your last visit?”    Yes, [facility] 24       
difficult to cover.    Patient Active Problem List   Diagnosis    Depression    Cardiomyopathy ([facility])    Vitamin D deficiency    Mental confusion    Atypical chest pain    Essential (primary) hypertension    CHF (congestive heart failure) ([facility])    Gout    Pre-diabetes    Erectile dysfunction    Septic joint of left knee joint ([facility])    ADD (attention deficit disorder)    Type 2 diabetes mellitus with stage 3 chronic kidney disease, without long-term current use of insulin ([facility])    Cardiomyopathy, dilated, nonischemic ([facility])    Osteoarthritis of knee    Mixed hyperlipidemia    Sleep apnea    Left knee pain    Chronic renal disease    Bipolar disorder, current episode mixed, moderate ([facility])    Chronic systolic (congestive) heart failure ([facility])    Ischemic cardiomyopathy    Frequency of urination    Toenail fungus    Shortness of breath on exertion    History of CVA (cerebrovascular accident)    Primary hypertension    Stage 3a chronic kidney disease ([facility])    LVH (left ventricular hypertrophy)    Subacute cough    Bronchitis    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 If you have any questions, ask your nurse or doctor.                CHANGE how you take these medications      furosemide 20 MG tablet  Commonly known as: Lasix  Take 1 tablet by mouth daily  What changed: how much to take            CONTINUE taking these medications      albuterol sulfate  (90 Base) MCG/ACT inhaler  Commonly known as: Ventolin HFA  Inhale 2 puffs into the lungs every 4 hours as needed for Wheezing or Shortness of Breath     allopurinol 100 MG tablet  Commonly known as: ZYLOPRIM  TAKE 1 TABLET BY MOUTH EVERYDAY AT BEDTIME

## 2024-05-10 DIAGNOSIS — I25.5 ISCHEMIC CARDIOMYOPATHY: ICD-10-CM

## 2024-05-10 DIAGNOSIS — I50.22 CHRONIC SYSTOLIC (CONGESTIVE) HEART FAILURE (HCC): ICD-10-CM

## 2024-05-10 RX ORDER — CARVEDILOL 25 MG/1
TABLET ORAL
Qty: 180 TABLET | Refills: 1 | OUTPATIENT
Start: 2024-05-10

## 2024-06-14 ENCOUNTER — OFFICE VISIT (OUTPATIENT)
Dept: FAMILY MEDICINE CLINIC | Facility: CLINIC | Age: 59
End: 2024-06-14

## 2024-06-14 VITALS
RESPIRATION RATE: 18 BRPM | BODY MASS INDEX: 19.64 KG/M2 | SYSTOLIC BLOOD PRESSURE: 128 MMHG | HEIGHT: 70 IN | HEART RATE: 71 BPM | TEMPERATURE: 98.4 F | DIASTOLIC BLOOD PRESSURE: 92 MMHG | OXYGEN SATURATION: 99 % | WEIGHT: 137.2 LBS

## 2024-06-14 DIAGNOSIS — I13.10 BENIGN HYPERTENSIVE HEART AND KIDNEY DISEASE WITH CHRONIC KIDNEY DISEASE, STAGE III (HCC): ICD-10-CM

## 2024-06-14 DIAGNOSIS — G47.33 OSA (OBSTRUCTIVE SLEEP APNEA): Primary | ICD-10-CM

## 2024-06-14 DIAGNOSIS — I50.23 ACUTE ON CHRONIC SYSTOLIC CONGESTIVE HEART FAILURE (HCC): ICD-10-CM

## 2024-06-14 DIAGNOSIS — I10 ESSENTIAL (PRIMARY) HYPERTENSION: ICD-10-CM

## 2024-06-14 DIAGNOSIS — F31.62 BIPOLAR DISORDER, CURRENT EPISODE MIXED, MODERATE (HCC): ICD-10-CM

## 2024-06-14 DIAGNOSIS — N18.30 BENIGN HYPERTENSIVE HEART AND KIDNEY DISEASE WITH CHRONIC KIDNEY DISEASE, STAGE III (HCC): ICD-10-CM

## 2024-06-14 DIAGNOSIS — I51.9 DECREASED LEFT VENTRICULAR SYSTOLIC FUNCTION: ICD-10-CM

## 2024-06-14 RX ORDER — CARVEDILOL 3.12 MG/1
3.12 TABLET ORAL 2 TIMES DAILY
Qty: 180 TABLET | Refills: 1 | Status: SHIPPED | OUTPATIENT
Start: 2024-06-14

## 2024-06-14 ASSESSMENT — ENCOUNTER SYMPTOMS
DIARRHEA: 0
NAUSEA: 0
COUGH: 0
CHEST TIGHTNESS: 0
VOMITING: 0
SHORTNESS OF BREATH: 0

## 2024-06-14 NOTE — PROGRESS NOTES
Don Cooper is a 59 y.o. male (: 1965) presenting to address:    Chief Complaint   Patient presents with    Medication Check       Vitals:    24 1050   BP: (!) 128/92   Pulse: 71   Resp: 18   Temp: 98.4 °F (36.9 °C)   SpO2: 99%       \"Have you been to the ER, urgent care clinic since your last visit?  Hospitalized since your last visit?\"    NO    “Have you seen or consulted any other health care providers outside of [facility] since your last visit?”    NO           
Laterality Date    ANTERIOR CRUCIATE LIGAMENT REPAIR      APPENDECTOMY  2004    became septic from a rupture       Family History   Problem Relation Age of Onset    No Known Problems Mother     Cancer Father         prostate    Diabetes Father     Hypertension Father     No Known Problems Sister     No Known Problems Brother     No Known Problems Maternal Aunt     No Known Problems Maternal Uncle     No Known Problems Paternal Aunt     No Known Problems Paternal Uncle     Hypertension Maternal Grandmother     No Known Problems Maternal Grandfather     Alcohol Abuse Paternal Grandmother     Bipolar Disorder Paternal Grandmother     No Known Problems Paternal Grandfather     No Known Problems Other        Allergies   Allergen Reactions    Penicillins Anaphylaxis and Angioedema    Coconut Fatty Acids Swelling       Social History     Tobacco Use   Smoking Status Never   Smokeless Tobacco Never       Social History     Substance and Sexual Activity   Alcohol Use No       Immunization History   Administered Date(s) Administered    COVID-19, MODERNA BLUE border, Primary or Immunocompromised, (age 12y+), IM, 100 mcg/0.5mL 03/19/2021, 04/16/2021, 10/13/2021, 11/23/2021, 10/04/2022    COVID-19, MODERNA Bivalent, (age 12y+), IM, 50 mcg/0.5 mL 09/28/2022    COVID-19, MODERNA, (2023-24 formula), (age 12y+), IM, 50mcg/0.5mL 10/14/2023    Influenza Trivalent 11/21/2014, 10/17/2015, 10/18/2018    Influenza Virus Vaccine 10/17/2015, 11/20/2015, 10/11/2017, 10/23/2019, 09/26/2020, 11/08/2020, 10/09/2021, 10/04/2022    Influenza, FLUARIX, FLULAVAL, FLUZONE (age 6 mo+) AND AFLURIA, (age 3 y+), PF, 0.5mL 10/11/2017, 10/23/2019, 09/26/2020    Influenza, FLUCELVAX, (age 6 mo+), MDCK, PF, 0.5mL 09/28/2022, 09/29/2023    Influenza, Triv, 3 Years and older, IM (Afluria (5 yrs and older) 10/17/2015, 10/18/2018    Pneumococcal, PCV20, PREVNAR 20, (age 6w+), IM, 0.5mL 07/21/2022    Pneumococcal, PPSV23, PNEUMOVAX 23, (age 2y+), SC/IM, 0.5mL

## 2024-06-21 ENCOUNTER — OFFICE VISIT (OUTPATIENT)
Age: 59
End: 2024-06-21
Payer: COMMERCIAL

## 2024-06-21 VITALS
WEIGHT: 230 LBS | BODY MASS INDEX: 32.93 KG/M2 | OXYGEN SATURATION: 97 % | SYSTOLIC BLOOD PRESSURE: 128 MMHG | HEART RATE: 64 BPM | HEIGHT: 70 IN | DIASTOLIC BLOOD PRESSURE: 80 MMHG

## 2024-06-21 DIAGNOSIS — I50.22 CHRONIC SYSTOLIC (CONGESTIVE) HEART FAILURE (HCC): Primary | ICD-10-CM

## 2024-06-21 DIAGNOSIS — I10 PRIMARY HYPERTENSION: ICD-10-CM

## 2024-06-21 PROBLEM — J98.4 DISORDER OF LUNG: Status: ACTIVE | Noted: 2018-02-15

## 2024-06-21 PROBLEM — J14 HAEMOPHILUS INFLUENZAE PNEUMONIA (HCC): Status: ACTIVE | Noted: 2018-10-25

## 2024-06-21 PROCEDURE — 3074F SYST BP LT 130 MM HG: CPT | Performed by: INTERNAL MEDICINE

## 2024-06-21 PROCEDURE — 99214 OFFICE O/P EST MOD 30 MIN: CPT | Performed by: INTERNAL MEDICINE

## 2024-06-21 PROCEDURE — 3079F DIAST BP 80-89 MM HG: CPT | Performed by: INTERNAL MEDICINE

## 2024-06-21 ASSESSMENT — PATIENT HEALTH QUESTIONNAIRE - PHQ9
SUM OF ALL RESPONSES TO PHQ QUESTIONS 1-9: 0
3. TROUBLE FALLING OR STAYING ASLEEP: NOT AT ALL
9. THOUGHTS THAT YOU WOULD BE BETTER OFF DEAD, OR OF HURTING YOURSELF: NOT AT ALL
SUM OF ALL RESPONSES TO PHQ QUESTIONS 1-9: 0
7. TROUBLE CONCENTRATING ON THINGS, SUCH AS READING THE NEWSPAPER OR WATCHING TELEVISION: NOT AT ALL
SUM OF ALL RESPONSES TO PHQ9 QUESTIONS 1 & 2: 0
2. FEELING DOWN, DEPRESSED OR HOPELESS: NOT AT ALL
8. MOVING OR SPEAKING SO SLOWLY THAT OTHER PEOPLE COULD HAVE NOTICED. OR THE OPPOSITE, BEING SO FIGETY OR RESTLESS THAT YOU HAVE BEEN MOVING AROUND A LOT MORE THAN USUAL: NOT AT ALL
4. FEELING TIRED OR HAVING LITTLE ENERGY: NOT AT ALL
6. FEELING BAD ABOUT YOURSELF - OR THAT YOU ARE A FAILURE OR HAVE LET YOURSELF OR YOUR FAMILY DOWN: NOT AT ALL
SUM OF ALL RESPONSES TO PHQ QUESTIONS 1-9: 0
10. IF YOU CHECKED OFF ANY PROBLEMS, HOW DIFFICULT HAVE THESE PROBLEMS MADE IT FOR YOU TO DO YOUR WORK, TAKE CARE OF THINGS AT HOME, OR GET ALONG WITH OTHER PEOPLE: NOT DIFFICULT AT ALL
SUM OF ALL RESPONSES TO PHQ QUESTIONS 1-9: 0
1. LITTLE INTEREST OR PLEASURE IN DOING THINGS: NOT AT ALL
5. POOR APPETITE OR OVEREATING: NOT AT ALL

## 2024-06-21 NOTE — PROGRESS NOTES
Don Cooper presents today for   Chief Complaint   Patient presents with    Follow-up       Don Cooper preferred language for health care discussion is english/other.    Is someone accompanying this pt? no    Is the patient using any DME equipment during OV? no    Depression Screening:  Depression: Not at risk (6/21/2024)    PHQ-2     PHQ-2 Score: 0        Learning Assessment:  Who is the primary learner? Patient    What is the preferred language for health care of the primary learner? ENGLISH    How does the primary learner prefer to learn new concepts? DEMONSTRATION    Answered By patient    Relationship to Learner SELF           Pt currently taking Anticoagulant therapy? no    Pt currently taking Antiplatelet therapy ? aspirin      Coordination of Care:  1. Have you been to the ER, urgent care clinic since your last visit? Hospitalized since your last visit? no    2. Have you seen or consulted any other health care providers outside of the [facility] since your last visit? Include any pap smears or colon screening. no

## 2024-06-26 ASSESSMENT — ENCOUNTER SYMPTOMS
WHEEZING: 0
SHORTNESS OF BREATH: 1
CHEST TIGHTNESS: 0
COUGH: 0

## 2024-06-26 NOTE — PROGRESS NOTES
Assessment/Plan:       ICD-10-CM    1. Chronic systolic (congestive) heart failure (HCC/patient primarily had diastolic dysfunction by echo in 2015.  There is an echocardiogram done in 10/2018 demonstrating an EF of 60%.  Diffuse thickening of the aortic valve cusps.  Mild concentric LVH.  Continue present cardiac Rx return in 3 months I50.22 EKG 12 Lead     Echo (TTE) complete with contrast, bubble, strain, and 3D PRN    Echocardiogram 2017; EF 59%.  Moderate diastolic dysfunction.  Moderate concentric LVH.  Mild MR      2. Cardiomyopathy, unspecified type ([facility])  I42.9 EKG 12 Lead      3. History of CVA (cerebrovascular accident)  Z86.73       4. Bipolar affective disorder, remission status unspecified ([facility])  F31.9       5. Primary hypertension  I10       6. Stage 3a chronic kidney disease (CKD) ([facility])  N18.31       7. LVH (left ventricular hypertrophy), moderate in the past. I51.7    8      MARIA DEL ROSARIO            Subjective:   Valvin and is in the office today for cardiac reevaluation.  He is a 59-year-old hypertensive diabetic man that was diagnosed with diastolic heart failure in approximately 2015..  The patient was previously seen on 11/24/2015.  At that time, he had just been hospitalized for an acute cerebrovascular accident.  He reported a previous cardiac catheterization done when he was in his 30s presents did not show any significant blockage.  The patient believes that he has had a heart attack prior to 2015.    In the office today, he reports that he was recently hospitalized  (April, 2024) with congestive heart failure.  He reports that he feels good in the office today.  He has had no chest pain.  He has been taking furosemide.  His weight has stayed approximately 230 pounds..  He has been taking furosemide 20 mg a day.  The patient also has obstructive sleep apnea.    Patient's cardiac risk factors are: dyslipidemia, pre-- diabetes mellitus, hypertension    Past Med, Surg, Fam, Soc Hx and Allergies reviewed

## 2024-07-05 DIAGNOSIS — I50.22 CHRONIC SYSTOLIC (CONGESTIVE) HEART FAILURE (HCC): ICD-10-CM

## 2024-07-05 DIAGNOSIS — G47.30 SLEEP APNEA, UNSPECIFIED TYPE: Primary | ICD-10-CM

## 2024-07-05 RX ORDER — ALLOPURINOL 100 MG/1
TABLET ORAL
Qty: 90 TABLET | Refills: 1 | Status: SHIPPED | OUTPATIENT
Start: 2024-07-05

## 2024-09-03 DIAGNOSIS — R06.2 WHEEZING: ICD-10-CM

## 2024-09-05 RX ORDER — ALBUTEROL SULFATE 90 UG/1
AEROSOL, METERED RESPIRATORY (INHALATION)
Qty: 18 EACH | Refills: 0 | Status: SHIPPED | OUTPATIENT
Start: 2024-09-05

## 2024-09-05 NOTE — TELEPHONE ENCOUNTER
Last refilled 18 g with 1 refill. Last ov 6/14/24   Future Appointments   Date Time Provider Department Center   9/24/2024 10:30 AM [facility]   1/10/2025  1:00 PM Brian Maria MD [facility] BS AMB

## 2024-09-24 ENCOUNTER — OFFICE VISIT (OUTPATIENT)
Dept: FAMILY MEDICINE CLINIC | Facility: CLINIC | Age: 59
End: 2024-09-24
Payer: COMMERCIAL

## 2024-09-24 VITALS
WEIGHT: 232 LBS | OXYGEN SATURATION: 97 % | RESPIRATION RATE: 16 BRPM | DIASTOLIC BLOOD PRESSURE: 91 MMHG | TEMPERATURE: 98.1 F | BODY MASS INDEX: 33.21 KG/M2 | HEART RATE: 66 BPM | SYSTOLIC BLOOD PRESSURE: 155 MMHG | HEIGHT: 70 IN

## 2024-09-24 DIAGNOSIS — G47.30 SLEEP APNEA, UNSPECIFIED TYPE: ICD-10-CM

## 2024-09-24 DIAGNOSIS — I50.23 ACUTE ON CHRONIC SYSTOLIC CONGESTIVE HEART FAILURE (HCC): Primary | ICD-10-CM

## 2024-09-24 DIAGNOSIS — I10 ESSENTIAL (PRIMARY) HYPERTENSION: ICD-10-CM

## 2024-09-24 PROBLEM — R35.0 FREQUENCY OF URINATION: Status: RESOLVED | Noted: 2023-06-09 | Resolved: 2024-09-24

## 2024-09-24 PROBLEM — J14 HAEMOPHILUS INFLUENZAE PNEUMONIA (HCC): Status: RESOLVED | Noted: 2018-10-25 | Resolved: 2024-09-24

## 2024-09-24 PROBLEM — J40 BRONCHITIS: Status: RESOLVED | Noted: 2024-01-29 | Resolved: 2024-09-24

## 2024-09-24 PROBLEM — J98.4 DISORDER OF LUNG: Status: RESOLVED | Noted: 2018-02-15 | Resolved: 2024-09-24

## 2024-09-24 PROBLEM — I50.9 CHF EXACERBATION (HCC): Status: RESOLVED | Noted: 2024-04-26 | Resolved: 2024-09-24

## 2024-09-24 PROCEDURE — 3079F DIAST BP 80-89 MM HG: CPT | Performed by: STUDENT IN AN ORGANIZED HEALTH CARE EDUCATION/TRAINING PROGRAM

## 2024-09-24 PROCEDURE — 3077F SYST BP >= 140 MM HG: CPT | Performed by: STUDENT IN AN ORGANIZED HEALTH CARE EDUCATION/TRAINING PROGRAM

## 2024-09-24 PROCEDURE — 90471 IMMUNIZATION ADMIN: CPT | Performed by: STUDENT IN AN ORGANIZED HEALTH CARE EDUCATION/TRAINING PROGRAM

## 2024-09-24 PROCEDURE — 99214 OFFICE O/P EST MOD 30 MIN: CPT | Performed by: STUDENT IN AN ORGANIZED HEALTH CARE EDUCATION/TRAINING PROGRAM

## 2024-09-24 PROCEDURE — 90661 CCIIV3 VAC ABX FR 0.5 ML IM: CPT | Performed by: STUDENT IN AN ORGANIZED HEALTH CARE EDUCATION/TRAINING PROGRAM

## 2024-09-24 RX ORDER — FUROSEMIDE 20 MG
20 TABLET ORAL DAILY
Qty: 90 TABLET | Refills: 1 | Status: SHIPPED | OUTPATIENT
Start: 2024-09-24

## 2024-09-24 ASSESSMENT — ENCOUNTER SYMPTOMS
SHORTNESS OF BREATH: 0
NAUSEA: 0
VOMITING: 0
CHEST TIGHTNESS: 0
DIARRHEA: 0
COUGH: 0

## 2024-10-01 ENCOUNTER — TELEPHONE (OUTPATIENT)
Dept: FAMILY MEDICINE CLINIC | Facility: CLINIC | Age: 59
End: 2024-10-01

## 2024-10-01 NOTE — TELEPHONE ENCOUNTER
Pt called stating a statement needs to be sent to [facility] at fx#[phone] stating he does not need the equipment.

## 2024-10-17 ENCOUNTER — TELEMEDICINE (OUTPATIENT)
Dept: FAMILY MEDICINE CLINIC | Facility: CLINIC | Age: 59
End: 2024-10-17
Payer: COMMERCIAL

## 2024-10-17 ENCOUNTER — TELEPHONE (OUTPATIENT)
Dept: FAMILY MEDICINE CLINIC | Facility: CLINIC | Age: 59
End: 2024-10-17

## 2024-10-17 DIAGNOSIS — U07.1 COVID-19 VIRUS INFECTION: Primary | ICD-10-CM

## 2024-10-17 PROCEDURE — 99213 OFFICE O/P EST LOW 20 MIN: CPT | Performed by: FAMILY MEDICINE

## 2024-10-17 NOTE — TELEPHONE ENCOUNTER
Pt took an at home covid test and it was positive.  The patient's symptoms are chills, aches, and cough.  The patient is asking what to do next and is also wanting to make an appointment.  He does know that 's [facility] schedule is full, so he is willing to be seen by another provider in the office.  I am scheduling him now with another provider for a virtual visit.

## 2024-10-17 NOTE — TELEPHONE ENCOUNTER
Patient has a VV for today with Dr. Mario to discuss what treatment plan or suggestions for this concerns and symptoms.

## 2024-10-17 NOTE — PROGRESS NOTES
10/17/2024    TELEHEALTH EVALUATION -- Audio/Visual    HPI:    Don Cooper (:  1965) has requested an audio/video evaluation for the following concern(s):    Mr. Cooper reports the onset of cough, body aches, feeling cold and diarrhea 2 days ago and tested positive for COVID-19 yesterday.  He denies any respiratory difficulty.  He has multiple risk factors which make him a candidate for Paxlovid including history of congestive heart failure with cardiomyopathy, history of pneumonia, stage IIIb chronic kidney disease and a previous history of stroke.    Review of Systems    Prior to Visit Medications    Medication Sig Taking? Authorizing Provider   furosemide (LASIX) 20 MG tablet Take 1 tablet by mouth daily Yes Aliza Hopson DO   NIFEdipine (ADALAT CC) 60 MG extended release tablet Take 1 tablet by mouth daily Yes Aliza Hopson DO   allopurinol (ZYLOPRIM) 100 MG tablet TAKE 1 TABLET BY MOUTH EVERYDAY AT BEDTIME Yes Aliza Hopson DO   carvedilol (COREG) 3.125 MG tablet Take 1 tablet by mouth 2 times daily Yes Aliza Hopson DO   lisinopril (PRINIVIL;ZESTRIL) 10 MG tablet TAKE 1 TABLET BY MOUTH EVERY DAY Yes Aliza Hopson DO   rosuvastatin (CRESTOR) 10 MG tablet TAKE 1 TABLET BY MOUTH ONCE NIGHTLY Yes Seble Walsh MD   aspirin 81 MG EC tablet Take 1 tablet by mouth daily Yes Automatic Reconciliation, Ar   FLUoxetine (PROZAC) 10 MG capsule TAKE 1 CAPSULE BY MOUTH EVERY DAY Yes Automatic Reconciliation, Ar   LORazepam (ATIVAN) 1 MG tablet Take 1 tablet by mouth every 8 hours as needed. Yes Automatic Reconciliation, Ar       Social History     Tobacco Use    Smoking status: Never    Smokeless tobacco: Never   Substance Use Topics    Alcohol use: No    Drug use: No            PHYSICAL EXAMINATION:  [ INSTRUCTIONS:  \"[x]\" Indicates a positive item  \"[]\" Indicates a negative item  -- DELETE ALL ITEMS NOT EXAMINED]  Vital Signs: (As obtained by patient/caregiver or

## 2024-10-21 ENCOUNTER — TELEPHONE (OUTPATIENT)
Dept: FAMILY MEDICINE CLINIC | Facility: CLINIC | Age: 59
End: 2024-10-21

## 2024-10-21 NOTE — TELEPHONE ENCOUNTER
Patient seen on 10/17/24 for Covid. Patient has 1 day left of medication. Patient states that he is still coughing would like to know if there is anything he could be prescribed for the cough

## 2024-10-22 ENCOUNTER — TELEPHONE (OUTPATIENT)
Dept: FAMILY MEDICINE CLINIC | Facility: CLINIC | Age: 59
End: 2024-10-22

## 2024-10-22 RX ORDER — BENZONATATE 100 MG/1
100 CAPSULE ORAL 3 TIMES DAILY PRN
Qty: 30 CAPSULE | Refills: 0 | Status: SHIPPED | OUTPATIENT
Start: 2024-10-22 | End: 2024-11-01

## 2024-10-22 NOTE — TELEPHONE ENCOUNTER
Patient states the symptoms of:    Constipation and Coughing at night, and weakness since starting and completing Paxlovid.    Please advise.

## 2024-10-23 NOTE — TELEPHONE ENCOUNTER
Patient is concerned about his congestive heart failure in May 2024 dx.    Patient has gained 9 lbs since May 2024.     O2 Meter runs from 72 to 92 of Oxygen at home.     [phone]

## 2024-10-24 ENCOUNTER — TELEPHONE (OUTPATIENT)
Dept: FAMILY MEDICINE CLINIC | Facility: CLINIC | Age: 59
End: 2024-10-24

## 2024-10-24 ENCOUNTER — OFFICE VISIT (OUTPATIENT)
Dept: FAMILY MEDICINE CLINIC | Facility: CLINIC | Age: 59
End: 2024-10-24
Payer: COMMERCIAL

## 2024-10-24 VITALS
BODY MASS INDEX: 35.22 KG/M2 | WEIGHT: 246 LBS | HEART RATE: 101 BPM | HEIGHT: 70 IN | SYSTOLIC BLOOD PRESSURE: 117 MMHG | RESPIRATION RATE: 20 BRPM | DIASTOLIC BLOOD PRESSURE: 67 MMHG | TEMPERATURE: 97.7 F | OXYGEN SATURATION: 91 %

## 2024-10-24 DIAGNOSIS — U07.1 COVID: ICD-10-CM

## 2024-10-24 DIAGNOSIS — I50.9 CONGESTIVE HEART FAILURE, UNSPECIFIED HF CHRONICITY, UNSPECIFIED HEART FAILURE TYPE (HCC): Primary | ICD-10-CM

## 2024-10-24 DIAGNOSIS — R06.02 SOB (SHORTNESS OF BREATH): ICD-10-CM

## 2024-10-24 PROCEDURE — 3074F SYST BP LT 130 MM HG: CPT | Performed by: STUDENT IN AN ORGANIZED HEALTH CARE EDUCATION/TRAINING PROGRAM

## 2024-10-24 PROCEDURE — 3078F DIAST BP <80 MM HG: CPT | Performed by: STUDENT IN AN ORGANIZED HEALTH CARE EDUCATION/TRAINING PROGRAM

## 2024-10-24 PROCEDURE — 99215 OFFICE O/P EST HI 40 MIN: CPT | Performed by: STUDENT IN AN ORGANIZED HEALTH CARE EDUCATION/TRAINING PROGRAM

## 2024-10-24 RX ORDER — ZIPRASIDONE HYDROCHLORIDE 20 MG/1
20 CAPSULE ORAL 2 TIMES DAILY WITH MEALS
COMMUNITY
Start: 2024-10-10

## 2024-10-24 ASSESSMENT — ENCOUNTER SYMPTOMS
VOMITING: 0
DIARRHEA: 0
COUGH: 1
CHEST TIGHTNESS: 0
NAUSEA: 0
SHORTNESS OF BREATH: 1

## 2024-10-24 NOTE — TELEPHONE ENCOUNTER
Patient was called to let him know that our company we use for O2 doesn't have it in stock. The patient was called and wants to wait for it.

## 2024-10-24 NOTE — PROGRESS NOTES
[facility]    HISTORY OF PRESENT ILLNESS  Don Cooper  is a 59 y.o. y.o. male here for acute visit    Covid Dx   -tested positive 10/16  -went to telehealth appt 10/17 and prescribed paxlovid  -endorses feeling SOB while walking and low O2 at home 70-92  -previously on O2 I past for CHF exacerbations, but Dc'd due to progress  -experienced constipation while on paxlovid, now improved  -prescribed tessalon pearls for cough, which helps somewhat  -hx of CHF w/ cardiomyopathy, CKD, PNA  -O2 stats 90 in office with increased work of breathing  -has been weight himself and notices few lb decrease in recent days  -does note swelling legs      Mr#: [id number]      Past Medical History:   Diagnosis Date    Acute renal failure with tubular necrosis ([facility]) 05/12/2012    Anemia     Bipolar disorder ([facility]) 09/13/2023    Bipolar II disorder ([facility])     CAP (community acquired pneumonia) 03/21/2015    Formatting of this note might be different from the original.  Gram negative, atypical, aspiration suspected       Cardiomyopathy ([facility])     Cardiomyopathy ([facility])     Cerebrovascular accident (HCC) 08/16/2012    CHF (congestive heart failure) ([facility])     CHF exacerbation ([facility]) 04/26/2024    CVA (cerebral infarction)     Depression     Disorder of lung 02/15/2018    ED (erectile dysfunction)     Gastritis     Gout     Haemophilus influenzae pneumonia ([facility]) 10/25/2018    HTN (hypertension)     Hyperlipemia, mixed     Hypoxia     Kidney disease, chronic, stage III (moderate, EGFR 30-59 ml/min) ([facility])     Pre-diabetes     Sleep apnea     Stage 3 chronic kidney disease ([facility]) 01/30/2018    Vitamin D deficiency        Past Surgical History:   Procedure Laterality Date    ANTERIOR CRUCIATE LIGAMENT REPAIR      APPENDECTOMY  2004    became septic from a rupture       Family History   Problem Relation Age of Onset    No Known Problems Mother     Cancer Father         prostate    Diabetes Father     Hypertension Father     No

## 2024-10-24 NOTE — PROGRESS NOTES
Don Cooper is a 59 y.o. male (: 1965) presenting to address:    Chief Complaint   Patient presents with    Follow-up     Oxygen Levels since COVID       There were no vitals filed for this visit.    \"Have you been to the ER, urgent care clinic since your last visit?  Hospitalized since your last visit?\"    NO    “Have you seen or consulted any other health care providers outside of [facility] since your last visit?”    NO

## 2024-10-28 LAB
ANION GAP SERPL CALCULATED.3IONS-SCNC: 11 MMOL/L (ref 3–15)
B-TYPE NATRIURETIC PEPTIDE: 2934 PG/ML
BUN BLDV-MCNC: 17 MG/DL (ref 6–22)
CALCIUM SERPL-MCNC: 9.7 MG/DL (ref 8.4–10.5)
CHLORIDE BLD-SCNC: 99 MMOL/L (ref 98–110)
CO2: 26 MMOL/L (ref 20–32)
CREAT SERPL-MCNC: 1.5 MG/DL (ref 0.8–1.6)
GFR, ESTIMATED: 51.4 ML/MIN/1.73 SQ.M.
GLUCOSE: 96 MG/DL (ref 70–99)
HCT VFR BLD CALC: 42.9 % (ref 39.3–51.6)
HEMOGLOBIN: 14.6 G/DL (ref 13.1–17.2)
MCH RBC QN AUTO: 32 PG (ref 26–34)
MCHC RBC AUTO-ENTMCNC: 34 G/DL (ref 31–36)
MCV RBC AUTO: 95 FL (ref 80–95)
PDW BLD-RTO: 14.3 % (ref 10–15.5)
PLATELET # BLD: 219 K/UL (ref 140–440)
PMV BLD AUTO: 10 FL (ref 9–13)
POTASSIUM SERPL-SCNC: 4.7 MMOL/L (ref 3.5–5.5)
RBC # BLD: 4.52 M/UL (ref 3.8–5.8)
SODIUM BLD-SCNC: 136 MMOL/L (ref 133–145)
WBC # BLD: 7.6 K/UL (ref 4–11)

## 2024-11-04 ENCOUNTER — OFFICE VISIT (OUTPATIENT)
Dept: FAMILY MEDICINE CLINIC | Facility: CLINIC | Age: 59
End: 2024-11-04
Payer: COMMERCIAL

## 2024-11-04 VITALS
BODY MASS INDEX: 34.66 KG/M2 | DIASTOLIC BLOOD PRESSURE: 73 MMHG | RESPIRATION RATE: 16 BRPM | SYSTOLIC BLOOD PRESSURE: 124 MMHG | HEIGHT: 69 IN | TEMPERATURE: 97.6 F | WEIGHT: 234 LBS | OXYGEN SATURATION: 97 % | HEART RATE: 74 BPM

## 2024-11-04 DIAGNOSIS — I10 ESSENTIAL (PRIMARY) HYPERTENSION: ICD-10-CM

## 2024-11-04 DIAGNOSIS — I50.9 CONGESTIVE HEART FAILURE, UNSPECIFIED HF CHRONICITY, UNSPECIFIED HEART FAILURE TYPE (HCC): Primary | ICD-10-CM

## 2024-11-04 PROBLEM — R06.02 SHORTNESS OF BREATH ON EXERTION: Status: RESOLVED | Noted: 2023-06-09 | Resolved: 2024-11-04

## 2024-11-04 PROBLEM — R05.2 SUBACUTE COUGH: Status: RESOLVED | Noted: 2024-01-29 | Resolved: 2024-11-04

## 2024-11-04 PROCEDURE — 3078F DIAST BP <80 MM HG: CPT | Performed by: STUDENT IN AN ORGANIZED HEALTH CARE EDUCATION/TRAINING PROGRAM

## 2024-11-04 PROCEDURE — 3074F SYST BP LT 130 MM HG: CPT | Performed by: STUDENT IN AN ORGANIZED HEALTH CARE EDUCATION/TRAINING PROGRAM

## 2024-11-04 PROCEDURE — 99214 OFFICE O/P EST MOD 30 MIN: CPT | Performed by: STUDENT IN AN ORGANIZED HEALTH CARE EDUCATION/TRAINING PROGRAM

## 2024-11-04 RX ORDER — FUROSEMIDE 40 MG/1
40 TABLET ORAL DAILY
Qty: 90 TABLET | Refills: 1 | Status: SHIPPED | OUTPATIENT
Start: 2024-11-04

## 2024-11-04 ASSESSMENT — ENCOUNTER SYMPTOMS
NAUSEA: 0
CHEST TIGHTNESS: 0
DIARRHEA: 0
VOMITING: 0
COUGH: 0
SHORTNESS OF BREATH: 0

## 2024-11-04 NOTE — PROGRESS NOTES
Don Cooper is a 59 y.o. male (: 1965) presenting to address:    Chief Complaint   Patient presents with    Follow-up     Doing better       Vitals:    24 1338   BP: 124/73   Pulse: 74   Resp: 16   Temp: 97.6 °F (36.4 °C)   SpO2: 97%       \"Have you been to the ER, urgent care clinic since your last visit?  Hospitalized since your last visit?\"    NO    “Have you seen or consulted any other health care providers outside of [facility] since your last visit?”    NO         
and AFLURIA, (age 3 y+), Quadv PF, 0.5mL 10/11/2017, 10/23/2019, 09/26/2020    Influenza, FLUCELVAX, (age 6 mo+) IM, Trivalent PF, 0.5mL 09/24/2024    Influenza, FLUCELVAX, (age 6 mo+), MDCK, Quadv PF, 0.5mL 09/28/2022, 09/29/2023    Pneumococcal, PCV20, PREVNAR 20, (age 6w+), IM, 0.5mL 07/21/2022    Pneumococcal, PPSV23, PNEUMOVAX 23, (age 2y+), SC/IM, 0.5mL 05/13/2012, 05/21/2012    TDaP, ADACEL (age 10y-64y), BOOSTRIX (age 10y+), IM, 0.5mL 10/23/2019    Zoster Recombinant (Shingrix) 09/25/2023, 12/01/2023       Patient Active Problem List   Diagnosis    Depression    Vitamin D deficiency    Mental confusion    Essential (primary) hypertension    CHF (congestive heart failure) (HCC)    Gout    Pre-diabetes    Erectile dysfunction    ADD (attention deficit disorder)    Cardiomyopathy, dilated, nonischemic (HCC)    Osteoarthritis of knee    Mixed hyperlipidemia    Sleep apnea    Left knee pain    Chronic renal disease    Bipolar disorder, current episode mixed, moderate (HCC)    Chronic systolic (congestive) heart failure (HCC)    Ischemic cardiomyopathy    Toenail fungus    History of CVA (cerebrovascular accident)    Stage 3a chronic kidney disease (HCC)    LVH (left ventricular hypertrophy)    Benign hypertensive 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, stage III (HCC)    Acute on chronic systolic congestive heart failure (HCC)    Chronic kidney disease, unspecified    Cerebral infarction (HCC)    Gastro-esophageal reflux disease without esophagitis    Major depression, single episode    Pure hypercholesterolemia         Current Outpatient Medications:     furosemide (LASIX) 40 MG tablet, Take 1 tablet by mouth daily, Disp: 90 tablet, Rfl: 1    ziprasidone (GEODON) 20 MG capsule, Take 1 capsule by mouth 2 times daily (with meals), Disp: , Rfl:     NIFEdipine (ADALAT CC) 60 MG extended release tablet, Take 1 tablet by mouth daily, Disp: 90 tablet, Rfl: 1    allopurinol (ZYLOPRIM) 100 MG tablet, TAKE 1

## 2024-11-07 LAB
BUN SERPL-MCNC: 26 MG/DL (ref 8–27)
BUN/CREAT SERPL: 14 (ref 10–24)
CALCIUM SERPL-MCNC: 9.2 MG/DL (ref 8.6–10.2)
CHLORIDE SERPL-SCNC: 102 MMOL/L (ref 96–106)
CO2 SERPL-SCNC: 25 MMOL/L (ref 20–29)
CREAT SERPL-MCNC: 1.9 MG/DL (ref 0.76–1.27)
EGFRCR SERPLBLD CKD-EPI 2021: 40 ML/MIN/1.73
GLUCOSE SERPL-MCNC: 92 MG/DL (ref 70–99)
POTASSIUM SERPL-SCNC: 4.2 MMOL/L (ref 3.5–5.2)
SODIUM SERPL-SCNC: 143 MMOL/L (ref 134–144)
SPECIMEN STATUS REPORT: NORMAL

## 2025-01-04 RX ORDER — CARVEDILOL 25 MG/1
TABLET ORAL
Qty: 180 TABLET | Refills: 1 | OUTPATIENT
Start: 2025-01-04

## 2025-01-09 DIAGNOSIS — E78.5 HYPERLIPIDEMIA, UNSPECIFIED: ICD-10-CM

## 2025-01-09 RX ORDER — ROSUVASTATIN CALCIUM 10 MG/1
TABLET, COATED ORAL
Qty: 90 TABLET | Refills: 0 | OUTPATIENT
Start: 2025-01-09

## 2025-01-10 ENCOUNTER — OFFICE VISIT (OUTPATIENT)
Age: 60
End: 2025-01-10
Payer: COMMERCIAL

## 2025-01-10 VITALS
OXYGEN SATURATION: 95 % | BODY MASS INDEX: 35.1 KG/M2 | SYSTOLIC BLOOD PRESSURE: 133 MMHG | HEART RATE: 74 BPM | DIASTOLIC BLOOD PRESSURE: 79 MMHG | HEIGHT: 69 IN | WEIGHT: 237 LBS

## 2025-01-10 DIAGNOSIS — I10 ESSENTIAL (PRIMARY) HYPERTENSION: ICD-10-CM

## 2025-01-10 DIAGNOSIS — I42.0 CARDIOMYOPATHY, DILATED, NONISCHEMIC (HCC): Primary | ICD-10-CM

## 2025-01-10 PROCEDURE — 3078F DIAST BP <80 MM HG: CPT | Performed by: INTERNAL MEDICINE

## 2025-01-10 PROCEDURE — 3075F SYST BP GE 130 - 139MM HG: CPT | Performed by: INTERNAL MEDICINE

## 2025-01-10 PROCEDURE — 99214 OFFICE O/P EST MOD 30 MIN: CPT | Performed by: INTERNAL MEDICINE

## 2025-01-10 RX ORDER — FUROSEMIDE 40 MG/1
20 TABLET ORAL DAILY
Qty: 90 TABLET | Refills: 1 | Status: SHIPPED | OUTPATIENT
Start: 2025-01-10

## 2025-01-10 ASSESSMENT — PATIENT HEALTH QUESTIONNAIRE - PHQ9
7. TROUBLE CONCENTRATING ON THINGS, SUCH AS READING THE NEWSPAPER OR WATCHING TELEVISION: NOT AT ALL
10. IF YOU CHECKED OFF ANY PROBLEMS, HOW DIFFICULT HAVE THESE PROBLEMS MADE IT FOR YOU TO DO YOUR WORK, TAKE CARE OF THINGS AT HOME, OR GET ALONG WITH OTHER PEOPLE: NOT DIFFICULT AT ALL
9. THOUGHTS THAT YOU WOULD BE BETTER OFF DEAD, OR OF HURTING YOURSELF: NOT AT ALL
8. MOVING OR SPEAKING SO SLOWLY THAT OTHER PEOPLE COULD HAVE NOTICED. OR THE OPPOSITE, BEING SO FIGETY OR RESTLESS THAT YOU HAVE BEEN MOVING AROUND A LOT MORE THAN USUAL: NOT AT ALL
SUM OF ALL RESPONSES TO PHQ QUESTIONS 1-9: 0
SUM OF ALL RESPONSES TO PHQ QUESTIONS 1-9: 0
6. FEELING BAD ABOUT YOURSELF - OR THAT YOU ARE A FAILURE OR HAVE LET YOURSELF OR YOUR FAMILY DOWN: NOT AT ALL
1. LITTLE INTEREST OR PLEASURE IN DOING THINGS: NOT AT ALL
2. FEELING DOWN, DEPRESSED OR HOPELESS: NOT AT ALL
3. TROUBLE FALLING OR STAYING ASLEEP: NOT AT ALL
SUM OF ALL RESPONSES TO PHQ QUESTIONS 1-9: 0
SUM OF ALL RESPONSES TO PHQ QUESTIONS 1-9: 0
4. FEELING TIRED OR HAVING LITTLE ENERGY: NOT AT ALL
SUM OF ALL RESPONSES TO PHQ9 QUESTIONS 1 & 2: 0
5. POOR APPETITE OR OVEREATING: NOT AT ALL

## 2025-01-10 NOTE — PROGRESS NOTES
Identified pt with two pt identifiers(name and ). Reviewed record in preparation for visit and have obtained necessary documentation.    Don LEXIE KeyesCooper presents today for   Chief Complaint   Patient presents with    Follow-up      f/u         Pt DENIES DIZZINESS, SOB, CHEST PAIN/ PRESSURE, FATIGUE/WEAKNESS, HEADACHES, SWELLING.             Don Cooper preferred language for health care discussion is english/other.    Personal Protective Equipment:   Personal Protective Equipment was used including: mask-surgical and hands-gloves. Patient was placed on no precaution(s). Patient was NOT masked.    Precautions:   Patient currently on None  Patient currently roomed with door closed.    Is someone accompanying this pt? NO    Is the patient using any DME equipment during OV? NO    Depression Screenin/10/2025     1:10 PM 2024    12:51 PM 2024     8:21 AM 2024     8:57 AM 2023     1:13 PM 2023     2:51 PM 2023     8:22 AM   PHQ-9 Questionaire   Little interest or pleasure in doing things 0 0 0 0 0 0 0   Feeling down, depressed, or hopeless 0 0 0 0 0 0 0   Trouble falling or staying asleep, or sleeping too much 0 0 0    0   Feeling tired or having little energy 0 0 0    0   Poor appetite or overeating 0 0 0    0   Feeling bad about yourself - or that you are a failure or have let yourself or your family down 0 0 0    0   Trouble concentrating on things, such as reading the newspaper or watching television 0 0 0    0   Moving or speaking so slowly that other people could have noticed. Or the opposite - being so fidgety or restless that you have been moving around a lot more than usual 0 0 0    0   Thoughts that you would be better off dead, or of hurting yourself in some way 0 0 0    0   PHQ-9 Total Score 0 0 0 0 0 0 0   If you checked off any problems, how difficult have these problems made it for you to do your work, take care of things at home, or get along with other people? 0

## 2025-01-13 DIAGNOSIS — E78.5 HYPERLIPIDEMIA, UNSPECIFIED: ICD-10-CM

## 2025-01-13 RX ORDER — ROSUVASTATIN CALCIUM 10 MG/1
TABLET, COATED ORAL
Qty: 90 TABLET | Refills: 0 | OUTPATIENT
Start: 2025-01-13

## 2025-01-20 ASSESSMENT — ENCOUNTER SYMPTOMS
COUGH: 0
WHEEZING: 0
SHORTNESS OF BREATH: 1
CHEST TIGHTNESS: 0

## 2025-01-20 NOTE — PROGRESS NOTES
Assessment/Plan:       ICD-10-CM    1. Chronic systolic (congestive) heart failure (HCC/patient primarily had diastolic dysfunction by echo in 2015.  There is an echocardiogram done in 10/2018 demonstrating an EF of 60%.  Diffuse thickening of the aortic valve cusps.  Mild concentric LVH.  The patient believes he is doing well.  Continue present cardiac Rx.  Return in 6 months I50.22 EKG 12 Lead     Echo (TTE) complete with contrast, bubble, strain, and 3D PRN    Echocardiogram 12/18/2023 EF 48% %.  For EF analysis only      2. Cardiomyopathy, unspecified type ([facility])  I42.9 EKG 12 Lead      3. History of CVA (cerebrovascular accident)  Z86.73       4. Bipolar affective disorder, remission status unspecified ([facility])  F31.9       5. Primary hypertension , BP controlled in the office today. I10       6. Stage 3a chronic kidney disease (CKD) ([facility])  N18.31       7. LVH (left ventricular hypertrophy), moderate in the past. I51.7    8      MARIA DEL ROSARIO            Subjective:   Don is in the office today for cardiac reevaluation.  He is a 59-year-old hypertensive diabetic man that was diagnosed with diastolic heart failure in approximately 2015..  The patient was previously seen on 11/24/2015.  At that time, he had just been hospitalized for an acute cerebrovascular accident.  He reported a previous cardiac catheterization done when he was in his 30s which did not show any significant blockage.  The patient believes that he has had a heart attack prior to 2015.    In the office today, the patient believes his breathing has been \"okay.  He has had no peripheral swelling.  He has had no palpitations.  He thinks he is doing very well.      Patient's cardiac risk factors are: dyslipidemia, pre-- diabetes mellitus, hypertension    Past Med, Surg, Fam, Soc Hx and Allergies reviewed and updated as indicated      Review of Systems   Constitutional:  Negative for activity change.   Respiratory:  Positive for shortness of breath. Negative for

## 2025-02-03 RX ORDER — GUAIFENESIN 600 MG/1
600 TABLET, EXTENDED RELEASE ORAL 2 TIMES DAILY
COMMUNITY
Start: 2025-01-29

## 2025-02-03 RX ORDER — BENZONATATE 100 MG/1
100 CAPSULE ORAL 3 TIMES DAILY PRN
COMMUNITY
Start: 2025-01-29

## 2025-02-04 ENCOUNTER — OFFICE VISIT (OUTPATIENT)
Dept: FAMILY MEDICINE CLINIC | Facility: CLINIC | Age: 60
End: 2025-02-04
Payer: COMMERCIAL

## 2025-02-04 VITALS
BODY MASS INDEX: 33.07 KG/M2 | HEIGHT: 70 IN | SYSTOLIC BLOOD PRESSURE: 114 MMHG | TEMPERATURE: 96.8 F | RESPIRATION RATE: 16 BRPM | OXYGEN SATURATION: 99 % | DIASTOLIC BLOOD PRESSURE: 71 MMHG | HEART RATE: 72 BPM | WEIGHT: 231 LBS

## 2025-02-04 DIAGNOSIS — Z09 HOSPITAL DISCHARGE FOLLOW-UP: ICD-10-CM

## 2025-02-04 DIAGNOSIS — I51.9 DECREASED LEFT VENTRICULAR SYSTOLIC FUNCTION: ICD-10-CM

## 2025-02-04 DIAGNOSIS — I10 ESSENTIAL (PRIMARY) HYPERTENSION: Primary | ICD-10-CM

## 2025-02-04 DIAGNOSIS — I50.22 CHRONIC SYSTOLIC (CONGESTIVE) HEART FAILURE (HCC): ICD-10-CM

## 2025-02-04 PROCEDURE — 3074F SYST BP LT 130 MM HG: CPT | Performed by: STUDENT IN AN ORGANIZED HEALTH CARE EDUCATION/TRAINING PROGRAM

## 2025-02-04 PROCEDURE — 99215 OFFICE O/P EST HI 40 MIN: CPT | Performed by: STUDENT IN AN ORGANIZED HEALTH CARE EDUCATION/TRAINING PROGRAM

## 2025-02-04 PROCEDURE — 3078F DIAST BP <80 MM HG: CPT | Performed by: STUDENT IN AN ORGANIZED HEALTH CARE EDUCATION/TRAINING PROGRAM

## 2025-02-04 PROCEDURE — 1111F DSCHRG MED/CURRENT MED MERGE: CPT | Performed by: STUDENT IN AN ORGANIZED HEALTH CARE EDUCATION/TRAINING PROGRAM

## 2025-02-04 RX ORDER — LISINOPRIL 10 MG/1
10 TABLET ORAL DAILY
Qty: 90 TABLET | Refills: 1 | Status: SHIPPED | OUTPATIENT
Start: 2025-02-04

## 2025-02-04 RX ORDER — ALLOPURINOL 100 MG/1
TABLET ORAL
Qty: 90 TABLET | Refills: 1 | Status: SHIPPED | OUTPATIENT
Start: 2025-02-04

## 2025-02-04 RX ORDER — DOXYCYCLINE 100 MG/1
100 CAPSULE ORAL 2 TIMES DAILY
COMMUNITY
Start: 2025-01-29

## 2025-02-04 SDOH — ECONOMIC STABILITY: FOOD INSECURITY: WITHIN THE PAST 12 MONTHS, YOU WORRIED THAT YOUR FOOD WOULD RUN OUT BEFORE YOU GOT MONEY TO BUY MORE.: NEVER TRUE

## 2025-02-04 SDOH — ECONOMIC STABILITY: FOOD INSECURITY: WITHIN THE PAST 12 MONTHS, THE FOOD YOU BOUGHT JUST DIDN'T LAST AND YOU DIDN'T HAVE MONEY TO GET MORE.: NEVER TRUE

## 2025-02-04 ASSESSMENT — ENCOUNTER SYMPTOMS
VOMITING: 0
CHEST TIGHTNESS: 0
NAUSEA: 0
COUGH: 0
SHORTNESS OF BREATH: 0
DIARRHEA: 0

## 2025-02-04 NOTE — PROGRESS NOTES
[facility]    HISTORY OF PRESENT ILLNESS  Don Cooper  is a 59 y.o. y.o. male here for HFU.    Hospital f/u:   Facility: [facility]  Discharge date: 1/24/25-1/29/25  Diagnosis: SOB, CHF  Summary: admited for chronic hypoxic respiratory failure, LLL PNA, prescribed ceftriaxone, doxycycline. Echo repeated and was unchanged EF 48%, recieved IV lasix  Since DC: now restricting liquids 1500mg, now taking home oxygen continuously  New meds: now taking lasix 40mg BID  Follow ups: sleep medicine next month  Need to fill out FMLA for wife    CHF  -following Dr. Maria   -cardiomyopathy  -hx of CVA    HYPERTENSION, Essential  Reports Compliance with meds nifedipine 60, unclear if taking lisinopril 10 because patient does not recall  Denies Chest pain, shortness of breath, lower extremity edema, vision changes, change in urination.    Lab Results   Component Value Date     11/06/2024    K 4.2 11/06/2024     11/06/2024    CO2 25 11/06/2024    GLUCOSE 92 11/06/2024    BUN 26 11/06/2024    CREATININE 1.90 (H) 11/06/2024    CALCIUM 9.2 11/06/2024    ALT 30 04/10/2024    AST 16 04/10/2024    ALKPHOS 82 04/10/2024         Mr#: [id number]      Past Medical History:   Diagnosis Date    Acute renal failure with tubular necrosis (HCC) 05/12/2012    Anemia     Atypical chest pain 05/14/2012    Bipolar disorder (HCC) 09/13/2023    Bipolar II disorder (HCC)     CAP (community acquired pneumonia) 03/21/2015    Formatting of this note might be different from the original.  Gram negative, atypical, aspiration suspected       Cardiomyopathy (HCC)     Cardiomyopathy (HCC)     Cerebrovascular accident (HCC) 08/16/2012    CHF (congestive heart failure) (HCC)     CHF exacerbation ([facility]) 04/26/2024    CVA (cerebral infarction)     Depression     Disorder of lung 02/15/2018    ED (erectile dysfunction)     Gastritis     Gout     Haemophilus influenzae pneumonia (HCC) 10/25/2018    HTN (hypertension)     Hyperlipemia,

## 2025-02-04 NOTE — PROGRESS NOTES
Don Cooper is a 59 y.o. male (: 1965) presenting to address:    Chief Complaint   Patient presents with    Follow-Up from Hospital     [facility] for SOB/Pnemomia/ CHF       Vitals:    25 1108   BP: 114/71   Pulse: 72   Resp: 16   Temp: 96.8 °F (36 °C)   SpO2: 99%       \"Have you been to the ER, urgent care clinic since your last visit?  Hospitalized since your last visit?\"    Yes, [facility]    “Have you seen or consulted any other health care providers outside of [facility] since your last visit?”    Yes, Cardiology

## 2025-02-17 DIAGNOSIS — E78.5 HYPERLIPIDEMIA, UNSPECIFIED: ICD-10-CM

## 2025-02-17 RX ORDER — ROSUVASTATIN CALCIUM 10 MG/1
10 TABLET, COATED ORAL DAILY
Qty: 90 TABLET | Refills: 1 | Status: SHIPPED | OUTPATIENT
Start: 2025-02-17

## 2025-03-04 ENCOUNTER — OFFICE VISIT (OUTPATIENT)
Dept: FAMILY MEDICINE CLINIC | Facility: CLINIC | Age: 60
End: 2025-03-04
Payer: COMMERCIAL

## 2025-03-04 VITALS
OXYGEN SATURATION: 97 % | DIASTOLIC BLOOD PRESSURE: 86 MMHG | HEART RATE: 82 BPM | RESPIRATION RATE: 18 BRPM | WEIGHT: 227 LBS | BODY MASS INDEX: 32.5 KG/M2 | HEIGHT: 70 IN | SYSTOLIC BLOOD PRESSURE: 140 MMHG | TEMPERATURE: 97.8 F

## 2025-03-04 DIAGNOSIS — I10 ESSENTIAL (PRIMARY) HYPERTENSION: Primary | ICD-10-CM

## 2025-03-04 DIAGNOSIS — I50.23 ACUTE ON CHRONIC SYSTOLIC CONGESTIVE HEART FAILURE (HCC): ICD-10-CM

## 2025-03-04 DIAGNOSIS — N18.30 BENIGN HYPERTENSIVE HEART AND KIDNEY DISEASE WITH CHRONIC KIDNEY DISEASE, STAGE III (HCC): ICD-10-CM

## 2025-03-04 DIAGNOSIS — I13.10 BENIGN HYPERTENSIVE HEART AND KIDNEY DISEASE WITH CHRONIC KIDNEY DISEASE, STAGE III (HCC): ICD-10-CM

## 2025-03-04 PROBLEM — I50.22 CHRONIC SYSTOLIC (CONGESTIVE) HEART FAILURE (HCC): Status: RESOLVED | Noted: 2023-03-09 | Resolved: 2025-03-04

## 2025-03-04 PROCEDURE — 3077F SYST BP >= 140 MM HG: CPT | Performed by: STUDENT IN AN ORGANIZED HEALTH CARE EDUCATION/TRAINING PROGRAM

## 2025-03-04 PROCEDURE — 99214 OFFICE O/P EST MOD 30 MIN: CPT | Performed by: STUDENT IN AN ORGANIZED HEALTH CARE EDUCATION/TRAINING PROGRAM

## 2025-03-04 PROCEDURE — 3079F DIAST BP 80-89 MM HG: CPT | Performed by: STUDENT IN AN ORGANIZED HEALTH CARE EDUCATION/TRAINING PROGRAM

## 2025-03-04 RX ORDER — MULTIVITAMIN WITH IRON
1 TABLET ORAL DAILY
COMMUNITY

## 2025-03-04 RX ORDER — OMEGA-3S/DHA/EPA/FISH OIL/D3 300MG-1000
400 CAPSULE ORAL DAILY
COMMUNITY

## 2025-03-04 RX ORDER — LISINOPRIL 20 MG/1
20 TABLET ORAL DAILY
Qty: 90 TABLET | Refills: 1 | Status: SHIPPED | OUTPATIENT
Start: 2025-03-04

## 2025-03-04 ASSESSMENT — ENCOUNTER SYMPTOMS
VOMITING: 0
COUGH: 0
CHEST TIGHTNESS: 0
SHORTNESS OF BREATH: 0
DIARRHEA: 0
NAUSEA: 0

## 2025-03-04 NOTE — PROGRESS NOTES
Don Cooper is a 59 y.o. male (: 1965) presenting to address:    Chief Complaint   Patient presents with    Hypertension     Getting off Oxygen  Lower dosages of pills  Need a Measle shot?       Vitals:    25 1034   BP: (!) 140/86   Pulse:    Resp:    Temp:    SpO2:        \"Have you been to the ER, urgent care clinic since your last visit?  Hospitalized since your last visit?\"    NO    “Have you seen or consulted any other health care providers outside of [facility] since your last visit?”    NO         
Walking without Oxygen and Heart Rate:    98 72  98 78  98 84  97 86  96 86  
daily (Patient taking differently: Take 1 tablet by mouth 2 times daily), Disp: 90 tablet, Rfl: 1    ziprasidone (GEODON) 20 MG capsule, Take 1 capsule by mouth 2 times daily (with meals), Disp: , Rfl:     carvedilol (COREG) 3.125 MG tablet, Take 1 tablet by mouth 2 times daily, Disp: 180 tablet, Rfl: 1    aspirin 81 MG EC tablet, Take 1 tablet by mouth daily, Disp: , Rfl:     FLUoxetine (PROZAC) 10 MG capsule, TAKE 1 CAPSULE BY MOUTH EVERY DAY, Disp: , Rfl:     LORazepam (ATIVAN) 1 MG tablet, Take 1 tablet by mouth every 8 hours as needed., Disp: , Rfl:       Review of Systems   Constitutional:  Negative for activity change and appetite change.   HENT:  Negative for congestion.    Respiratory:  Negative for cough, chest tightness and shortness of breath.    Cardiovascular:  Negative for chest pain and palpitations.   Gastrointestinal:  Negative for diarrhea, nausea and vomiting.   Neurological:  Negative for dizziness and seizures.   Psychiatric/Behavioral:  Negative for behavioral problems.        Physical Exam  Constitutional:       General: He is not in acute distress.     Appearance: Normal appearance. He is not ill-appearing.   HENT:      Head: Normocephalic and atraumatic.   Eyes:      Extraocular Movements: Extraocular movements intact.      Pupils: Pupils are equal, round, and reactive to light.   Cardiovascular:      Rate and Rhythm: Normal rate and regular rhythm.      Heart sounds: No murmur heard.  Pulmonary:      Effort: Pulmonary effort is normal. No respiratory distress.      Breath sounds: Normal breath sounds. No wheezing.   Musculoskeletal:         General: No swelling.   Skin:     General: Skin is warm and dry.   Neurological:      General: No focal deficit present.      Mental Status: He is alert and oriented to person, place, and time.   Psychiatric:         Mood and Affect: Mood normal.         Behavior: Behavior normal.          ASSESSMENT and PLAN    Don was seen today for

## 2025-04-03 DIAGNOSIS — E78.5 HYPERLIPIDEMIA, UNSPECIFIED: ICD-10-CM

## 2025-05-06 ENCOUNTER — OFFICE VISIT (OUTPATIENT)
Dept: FAMILY MEDICINE CLINIC | Facility: CLINIC | Age: 60
End: 2025-05-06
Payer: COMMERCIAL

## 2025-05-06 VITALS
OXYGEN SATURATION: 94 % | HEIGHT: 70 IN | DIASTOLIC BLOOD PRESSURE: 70 MMHG | SYSTOLIC BLOOD PRESSURE: 130 MMHG | BODY MASS INDEX: 31.7 KG/M2 | TEMPERATURE: 98.1 F | RESPIRATION RATE: 15 BRPM | HEART RATE: 69 BPM | WEIGHT: 221.4 LBS

## 2025-05-06 DIAGNOSIS — I10 ESSENTIAL (PRIMARY) HYPERTENSION: Primary | ICD-10-CM

## 2025-05-06 DIAGNOSIS — I50.23 ACUTE ON CHRONIC SYSTOLIC CONGESTIVE HEART FAILURE (HCC): ICD-10-CM

## 2025-05-06 DIAGNOSIS — N18.30 BENIGN HYPERTENSIVE HEART AND KIDNEY DISEASE WITH CHRONIC KIDNEY DISEASE, STAGE III (HCC): ICD-10-CM

## 2025-05-06 DIAGNOSIS — I13.10 BENIGN HYPERTENSIVE HEART AND KIDNEY DISEASE WITH CHRONIC KIDNEY DISEASE, STAGE III (HCC): ICD-10-CM

## 2025-05-06 PROCEDURE — 3078F DIAST BP <80 MM HG: CPT | Performed by: STUDENT IN AN ORGANIZED HEALTH CARE EDUCATION/TRAINING PROGRAM

## 2025-05-06 PROCEDURE — 3075F SYST BP GE 130 - 139MM HG: CPT | Performed by: STUDENT IN AN ORGANIZED HEALTH CARE EDUCATION/TRAINING PROGRAM

## 2025-05-06 PROCEDURE — 99214 OFFICE O/P EST MOD 30 MIN: CPT | Performed by: STUDENT IN AN ORGANIZED HEALTH CARE EDUCATION/TRAINING PROGRAM

## 2025-05-06 ASSESSMENT — ENCOUNTER SYMPTOMS
CHEST TIGHTNESS: 0
DIARRHEA: 0
VOMITING: 0
NAUSEA: 0
SHORTNESS OF BREATH: 0
COUGH: 0

## 2025-05-06 NOTE — PROGRESS NOTES
[facility]    HISTORY OF PRESENT ILLNESS  Don Cooper  is a 60 y.o. y.o. male here for FU.    HYPERTENSION, Essential  Reports Compliance with meds nifedipine 60, lisinopril 20   Checking BP at Home: YES  Denies Chest pain, shortness of breath, lower extremity edema, vision changes, change in urination.    Lab Results   Component Value Date     11/06/2024    K 4.2 11/06/2024     11/06/2024    CO2 25 11/06/2024    GLUCOSE 92 11/06/2024    BUN 26 11/06/2024    CREATININE 1.90 (H) 11/06/2024    CALCIUM 9.2 11/06/2024    ALT 30 04/10/2024    AST 16 04/10/2024    ALKPHOS 82 04/10/2024   Follows nephrology Dr. Jones    CHF  -following Dr. Maria, next appt 7/25  -cardiomyopathy  -hx of CVA  -multiple hospitalizations for CHF exacerbations this year  -lasix 40 BID, jardiance 10  -has continued to lose weight    Mr#: [id number]      Past Medical History:   Diagnosis Date    Acute renal failure with tubular necrosis 05/12/2012    Anemia     Atypical chest pain 05/14/2012    Bipolar disorder ([facility]) 09/13/2023    Bipolar II disorder ([facility])     CAP (community acquired pneumonia) 03/21/2015    Formatting of this note might be different from the original.  Gram negative, atypical, aspiration suspected       Cardiomyopathy ([facility])     Cardiomyopathy ([facility])     Cerebrovascular accident ([facility]) 08/16/2012    CHF (congestive heart failure) ([facility])     CHF exacerbation ([facility]) 04/26/2024    CVA (cerebral infarction)     Depression     Disorder of lung 02/15/2018    ED (erectile dysfunction)     Gastritis     Gout     Haemophilus influenzae pneumonia 10/25/2018    HTN (hypertension)     Hyperlipemia, mixed     Hypoxia     Kidney disease, chronic, stage III (moderate, EGFR 30-59 ml/min) ([facility])     Pre-diabetes     Septic joint of left knee joint ([facility]) 10/23/2015    Sleep apnea     Stage 3 chronic kidney disease ([facility]) 01/30/2018    Type 2 diabetes mellitus with stage 3 chronic kidney disease, without long-term

## 2025-05-06 NOTE — PROGRESS NOTES
Don Cooper is a 60 y.o. male (: 1965) presenting to address:    Chief Complaint   Patient presents with    Follow-up       Vitals:    25 0952   BP: 130/70   Pulse: 69   Resp: 15   Temp: 98.1 °F (36.7 °C)   SpO2: 94%       \"Have you been to the ER, urgent care clinic since your last visit?  Hospitalized since your last visit?\"    NO    “Have you seen or consulted any other health care providers outside of [facility] since your last visit?”    NO

## 2025-05-16 ENCOUNTER — TELEPHONE (OUTPATIENT)
Dept: FAMILY MEDICINE CLINIC | Facility: CLINIC | Age: 60
End: 2025-05-16

## 2025-05-16 NOTE — TELEPHONE ENCOUNTER
PT called and wanted to know if the sleep study results was in and if he needs to be on oxygen.     Call back number is [phone]

## 2025-05-21 ENCOUNTER — TELEPHONE (OUTPATIENT)
Dept: FAMILY MEDICINE CLINIC | Facility: CLINIC | Age: 60
End: 2025-05-21

## 2025-05-21 NOTE — TELEPHONE ENCOUNTER
Pt called stating that sleep study informed him he no longer needs oxygen at night and will need Dr. Hopson to sign off on it.     [place] [phone]    PT stated he will call so they can come  the machine.

## 2025-06-03 ENCOUNTER — TELEPHONE (OUTPATIENT)
Dept: FAMILY MEDICINE CLINIC | Facility: CLINIC | Age: 60
End: 2025-06-03

## 2025-06-03 NOTE — TELEPHONE ENCOUNTER
PT called in regarding his oxygen. Stated that they did not come pick the oxygen up and wanted to know if the paperwork can be faxed back over.

## 2025-06-24 NOTE — TELEPHONE ENCOUNTER
Patient stated that he takes 2 pills a day prescription written for 0.5 daily. Patient requesting new script to be sent to pharmacy for 2 x daily 40 mg. Patient stated that medication has always been written this way. Please advise furosemide (LASIX) 40 MG tablet

## 2025-06-25 RX ORDER — FUROSEMIDE 40 MG/1
20 TABLET ORAL DAILY
Qty: 90 TABLET | Refills: 1 | Status: CANCELLED | OUTPATIENT
Start: 2025-06-25

## 2025-06-25 NOTE — TELEPHONE ENCOUNTER
Pt called requesting a refill but state he is supposed to be taking the script twice daily; it currently shows half a tablet.    Requested Prescriptions     Pending Prescriptions Disp Refills    furosemide (LASIX) 40 MG tablet 90 tablet 1     Sig: Take 0.5 tablets by mouth daily

## 2025-06-27 RX ORDER — FUROSEMIDE 40 MG/1
20 TABLET ORAL DAILY
Qty: 90 TABLET | Refills: 1 | Status: SHIPPED | OUTPATIENT
Start: 2025-06-27

## 2025-06-27 NOTE — TELEPHONE ENCOUNTER
Requested Prescriptions     Pending Prescriptions Disp Refills    furosemide (LASIX) 40 MG tablet 90 tablet 1     Sig: Take 0.5 tablets by mouth daily

## 2025-07-11 ENCOUNTER — OFFICE VISIT (OUTPATIENT)
Age: 60
End: 2025-07-11
Payer: COMMERCIAL

## 2025-07-11 VITALS
HEART RATE: 71 BPM | BODY MASS INDEX: 31.5 KG/M2 | SYSTOLIC BLOOD PRESSURE: 111 MMHG | OXYGEN SATURATION: 92 % | WEIGHT: 220 LBS | DIASTOLIC BLOOD PRESSURE: 69 MMHG | HEIGHT: 70 IN

## 2025-07-11 DIAGNOSIS — I50.22 CHRONIC SYSTOLIC (CONGESTIVE) HEART FAILURE (HCC): ICD-10-CM

## 2025-07-11 DIAGNOSIS — I42.0 CARDIOMYOPATHY, DILATED, NONISCHEMIC (HCC): Primary | ICD-10-CM

## 2025-07-11 DIAGNOSIS — Z86.73 HISTORY OF CVA (CEREBROVASCULAR ACCIDENT): ICD-10-CM

## 2025-07-11 DIAGNOSIS — I50.9 CONGESTIVE HEART FAILURE, UNSPECIFIED HF CHRONICITY, UNSPECIFIED HEART FAILURE TYPE (HCC): ICD-10-CM

## 2025-07-11 PROCEDURE — 99214 OFFICE O/P EST MOD 30 MIN: CPT | Performed by: INTERNAL MEDICINE

## 2025-07-11 PROCEDURE — 3074F SYST BP LT 130 MM HG: CPT | Performed by: INTERNAL MEDICINE

## 2025-07-11 PROCEDURE — 3078F DIAST BP <80 MM HG: CPT | Performed by: INTERNAL MEDICINE

## 2025-07-11 ASSESSMENT — PATIENT HEALTH QUESTIONNAIRE - PHQ9
4. FEELING TIRED OR HAVING LITTLE ENERGY: NOT AT ALL
2. FEELING DOWN, DEPRESSED OR HOPELESS: SEVERAL DAYS
5. POOR APPETITE OR OVEREATING: NOT AT ALL
6. FEELING BAD ABOUT YOURSELF - OR THAT YOU ARE A FAILURE OR HAVE LET YOURSELF OR YOUR FAMILY DOWN: NOT AT ALL
8. MOVING OR SPEAKING SO SLOWLY THAT OTHER PEOPLE COULD HAVE NOTICED. OR THE OPPOSITE, BEING SO FIGETY OR RESTLESS THAT YOU HAVE BEEN MOVING AROUND A LOT MORE THAN USUAL: NOT AT ALL
SUM OF ALL RESPONSES TO PHQ QUESTIONS 1-9: 2
10. IF YOU CHECKED OFF ANY PROBLEMS, HOW DIFFICULT HAVE THESE PROBLEMS MADE IT FOR YOU TO DO YOUR WORK, TAKE CARE OF THINGS AT HOME, OR GET ALONG WITH OTHER PEOPLE: NOT DIFFICULT AT ALL
7. TROUBLE CONCENTRATING ON THINGS, SUCH AS READING THE NEWSPAPER OR WATCHING TELEVISION: NOT AT ALL
SUM OF ALL RESPONSES TO PHQ QUESTIONS 1-9: 2
1. LITTLE INTEREST OR PLEASURE IN DOING THINGS: SEVERAL DAYS
3. TROUBLE FALLING OR STAYING ASLEEP: NOT AT ALL
9. THOUGHTS THAT YOU WOULD BE BETTER OFF DEAD, OR OF HURTING YOURSELF: NOT AT ALL

## 2025-07-11 NOTE — PROGRESS NOTES
Identified pt with two pt identifiers(name and ). Reviewed record in preparation for visit and have obtained necessary documentation.    Don OWEN Cooper presents today for   Chief Complaint   Patient presents with    Follow-up     6mo f/u       Pt denies DIZZINESS, SOB, CHEST PAIN/ PRESSURE, FATIGUE/WEAKNESS, HEADACHES, SWELLING.             Don Cooper preferred language for health care discussion is english/other.    Personal Protective Equipment:   Personal Protective Equipment was used including: mask-surgical and hands-gloves. Patient was placed on no precaution(s). Patient was not masked.    Precautions:   Patient currently on None  Patient currently roomed with door closed.    Is someone accompanying this pt? no    Is the patient using any DME equipment during OV? no    Depression Screenin/11/2025     1:32 PM 1/10/2025     1:10 PM 2024    12:51 PM 2024     8:21 AM 2024     8:57 AM 2023     1:13 PM 2023     2:51 PM   PHQ-9 Questionaire   Little interest or pleasure in doing things 1 0 0 0 0 0 0   Feeling down, depressed, or hopeless 1 0 0 0 0 0 0   Trouble falling or staying asleep, or sleeping too much 0 0 0 0      Feeling tired or having little energy 0 0 0 0      Poor appetite or overeating 0 0 0 0      Feeling bad about yourself - or that you are a failure or have let yourself or your family down 0 0 0 0      Trouble concentrating on things, such as reading the newspaper or watching television 0 0 0 0      Moving or speaking so slowly that other people could have noticed. Or the opposite - being so fidgety or restless that you have been moving around a lot more than usual 0 0 0 0      Thoughts that you would be better off dead, or of hurting yourself in some way 0 0 0 0      PHQ-9 Total Score 2 0 0 0 0 0 0   If you checked off any problems, how difficult have these problems made it for you to do your work, take care of things at home, or get along with other people?

## 2025-07-14 ASSESSMENT — ENCOUNTER SYMPTOMS: SORE THROAT: 1

## 2025-07-14 NOTE — PROGRESS NOTES
HISTORY OF PRESENT ILLNESS  Don Cooper  is a 60 y.o. y.o. male    He reports cold symptoms and a sore throat for about a week.  He indicates that the sore throat only bothers him at night.  He denies any history of fever or really congestion.  He is bothered somewhat by the the cough which he states is dry.  He notes that he and his wife have both self tested for COVID-19 and the test were negative.        Mr#: [id number]      Past Medical History:   Diagnosis Date    Acute renal failure with tubular necrosis 05/12/2012    Anemia     Atypical chest pain 05/14/2012    Bipolar disorder ([facility]) 09/13/2023    Bipolar II disorder ([facility])     CAP (community acquired pneumonia) 03/21/2015    Formatting of this note might be different from the original.  Gram negative, atypical, aspiration suspected       Cardiomyopathy ([facility])     Cardiomyopathy ([facility])     Cerebrovascular accident ([facility]) 08/16/2012    CHF (congestive heart failure) ([facility])     CHF exacerbation ([facility]) 04/26/2024    CVA (cerebral infarction)     Depression     Disorder of lung 02/15/2018    ED (erectile dysfunction)     Gastritis     Gout     Haemophilus influenzae pneumonia 10/25/2018    HTN (hypertension)     Hyperlipemia, mixed     Hypoxia     Kidney disease, chronic, stage III (moderate, EGFR 30-59 ml/min) ([facility])     Pre-diabetes     Septic joint of left knee joint ([facility]) 10/23/2015    Sleep apnea     Stage 3 chronic kidney disease ([facility]) 01/30/2018    Type 2 diabetes mellitus with stage 3 chronic kidney disease, without long-term current use of insulin ([facility]) 05/14/2012    Vitamin D deficiency        Past Surgical History:   Procedure Laterality Date    ANTERIOR CRUCIATE LIGAMENT REPAIR      APPENDECTOMY  2004    became septic from a rupture       Family History   Problem Relation Age of Onset    No Known Problems Mother     Cancer Father         prostate    Diabetes Father     Hypertension Father     No Known Problems Sister     No Known Problems Brother     No

## 2025-07-15 ENCOUNTER — OFFICE VISIT (OUTPATIENT)
Dept: FAMILY MEDICINE CLINIC | Facility: CLINIC | Age: 60
End: 2025-07-15
Payer: COMMERCIAL

## 2025-07-15 VITALS
DIASTOLIC BLOOD PRESSURE: 80 MMHG | RESPIRATION RATE: 16 BRPM | OXYGEN SATURATION: 98 % | WEIGHT: 219 LBS | HEIGHT: 70 IN | SYSTOLIC BLOOD PRESSURE: 120 MMHG | HEART RATE: 63 BPM | TEMPERATURE: 98.2 F | BODY MASS INDEX: 31.35 KG/M2

## 2025-07-15 DIAGNOSIS — J06.9 VIRAL UPPER RESPIRATORY TRACT INFECTION WITH COUGH: Primary | ICD-10-CM

## 2025-07-15 PROCEDURE — 3079F DIAST BP 80-89 MM HG: CPT | Performed by: FAMILY MEDICINE

## 2025-07-15 PROCEDURE — 99213 OFFICE O/P EST LOW 20 MIN: CPT | Performed by: FAMILY MEDICINE

## 2025-07-15 PROCEDURE — 3074F SYST BP LT 130 MM HG: CPT | Performed by: FAMILY MEDICINE

## 2025-07-15 RX ORDER — CETIRIZINE HYDROCHLORIDE 10 MG/1
TABLET ORAL
Qty: 30 TABLET | Refills: 0 | Status: SHIPPED | OUTPATIENT
Start: 2025-07-15

## 2025-07-15 RX ORDER — BENZONATATE 200 MG/1
200 CAPSULE ORAL 3 TIMES DAILY PRN
Qty: 30 CAPSULE | Refills: 0 | Status: SHIPPED | OUTPATIENT
Start: 2025-07-15 | End: 2025-07-25

## 2025-07-15 RX ORDER — LIDOCAINE HYDROCHLORIDE 20 MG/ML
SOLUTION OROPHARYNGEAL
Qty: 100 ML | Refills: 0 | Status: SHIPPED | OUTPATIENT
Start: 2025-07-15

## 2025-07-15 ASSESSMENT — PATIENT HEALTH QUESTIONNAIRE - PHQ9
3. TROUBLE FALLING OR STAYING ASLEEP: NOT AT ALL
2. FEELING DOWN, DEPRESSED OR HOPELESS: NOT AT ALL
SUM OF ALL RESPONSES TO PHQ QUESTIONS 1-9: 0

## 2025-07-15 ASSESSMENT — ENCOUNTER SYMPTOMS
SHORTNESS OF BREATH: 0
RHINORRHEA: 0
TROUBLE SWALLOWING: 0
COUGH: 1

## 2025-07-15 NOTE — PATIENT INSTRUCTIONS
Current Status:  History and exam consistent with viral upper respiratory infection with cough and sore throat.  The cough seems to only occur at night which raises the question of possible postnasal drainage when he is supine.  His and his wife's concern about his history of congestive heart failure was noted and discussed with them.  Exam today is not consistent with cardiac decompensation.      Plan:  Maintain hydration  Over-the-counter acetaminophen as needed for pain  Viscous Xylocaine 2%, 1 teaspoon gargle and spit out up to every 3 hours if needed for throat pain  Benzonatate capsules 200 mg up to 3 times a day if needed for cough  Cetirizine 10 mg daily at bedtime to help with possible postnasal drainage  Report new or worsening symptoms  Please always arrive at least 15 minutes before your scheduled appointment time.

## 2025-07-15 NOTE — PROGRESS NOTES
Don Cooper is a 60 y.o. male (: 1965) presenting to address:    Chief Complaint   Patient presents with    Cold Symptoms    Pharyngitis     Sore throat for a week only at night        Vitals:    07/15/25 1258   BP: 120/80   Pulse: 63   Resp: 16   Temp: 98.2 °F (36.8 °C)   SpO2: 98%       \"Have you been to the ER, urgent care clinic since your last visit?  Hospitalized since your last visit?\"    NO    “Have you seen or consulted any other health care providers outside of [facility] since your last visit?”    NO

## 2025-07-19 NOTE — PROGRESS NOTES
[facility] Follow-Up Note    Assessment/Plan:     Assessment & Plan  1.  HFpEF:  - Continue current medications: Jardiance, carvedilol, lisinopril, and furosemide.  - Jardiance benefits: improves heart and kidney function  - Jardiance side effects: risk of yeast infections or other infections due to excess glucose in urine; advised to stay dry.  - Maintain physical activity, such as walking the dog.  - Schedule a repeat echocardiogram in 6 months to monitor heart function.      2  Cardiomyopathy, unspecified type (HCC)          3.  History of cerebrovascular accident          4.  Bipolar affective disorder          5.  Essential hypertension          6.  Stage IIIa chronic kidney disease          7.  LVH, moderate in the past       8      MARIA DEL ROSARIO           Follow-up: 01/2026    History of Present Illness  The patient is a 60-year-old man who presents for evaluation of heart condition.    He reports feeling well overall, with no recent episodes of leg swelling or palpitations. His daily routine includes walking his dog in the mornings. He is compliant with his medication regimen, which includes Jardiance.  He confirms that furosemide continues to have a diuretic effect on him.    SOCIAL HISTORY  Marital Status:   Exercise: Walks the dog in the mornings     Current Outpatient Medications   Medication Sig Dispense Refill    furosemide (LASIX) 40 MG tablet Take 1 tablet by mouth 2 times daily 180 tablet 1    empagliflozin (JARDIANCE) 10 MG tablet Take 1 tablet by mouth daily      lisinopril (PRINIVIL;ZESTRIL) 20 MG tablet Take 1 tablet by mouth daily 90 tablet 1    rosuvastatin (CRESTOR) 10 MG tablet Take 1 tablet by mouth daily 90 tablet 1    NIFEdipine (ADALAT CC) 60 MG extended release tablet Take 1 tablet by mouth daily 90 tablet 1    carvedilol (COREG) 3.125 MG tablet Take 1 tablet by mouth 2 times daily 180 tablet 1    aspirin 81 MG EC tablet Take 1 tablet by mouth daily      cetirizine (ZYRTEC)

## 2025-08-12 DIAGNOSIS — I51.9 DECREASED LEFT VENTRICULAR SYSTOLIC FUNCTION: ICD-10-CM

## 2025-08-12 RX ORDER — CARVEDILOL 3.12 MG/1
3.12 TABLET ORAL 2 TIMES DAILY
Qty: 180 TABLET | Refills: 1 | Status: SHIPPED | OUTPATIENT
Start: 2025-08-12

## 2025-08-14 RX ORDER — ALLOPURINOL 100 MG/1
TABLET ORAL
Qty: 90 TABLET | Refills: 1 | Status: SHIPPED | OUTPATIENT
Start: 2025-08-14

## 2025-08-26 RX ORDER — LISINOPRIL 20 MG/1
20 TABLET ORAL DAILY
Qty: 90 TABLET | Refills: 1 | Status: SHIPPED | OUTPATIENT
Start: 2025-08-26